# Patient Record
Sex: FEMALE | Race: WHITE | HISPANIC OR LATINO | Employment: FULL TIME | URBAN - METROPOLITAN AREA
[De-identification: names, ages, dates, MRNs, and addresses within clinical notes are randomized per-mention and may not be internally consistent; named-entity substitution may affect disease eponyms.]

---

## 2018-01-12 ENCOUNTER — GENERIC CONVERSION - ENCOUNTER (OUTPATIENT)
Dept: OTHER | Facility: OTHER | Age: 53
End: 2018-01-12

## 2018-01-12 ENCOUNTER — ALLSCRIPTS OFFICE VISIT (OUTPATIENT)
Dept: OTHER | Facility: OTHER | Age: 53
End: 2018-01-12

## 2018-01-12 DIAGNOSIS — K21.9 GASTRO-ESOPHAGEAL REFLUX DISEASE WITHOUT ESOPHAGITIS: ICD-10-CM

## 2018-01-12 DIAGNOSIS — M48.00 SPINAL STENOSIS: ICD-10-CM

## 2018-01-12 DIAGNOSIS — I10 ESSENTIAL (PRIMARY) HYPERTENSION: ICD-10-CM

## 2018-01-12 DIAGNOSIS — L70.0 ACNE VULGARIS: ICD-10-CM

## 2018-01-12 DIAGNOSIS — G47.00 INSOMNIA: ICD-10-CM

## 2018-01-12 DIAGNOSIS — J45.20 MILD INTERMITTENT ASTHMA, UNCOMPLICATED: ICD-10-CM

## 2018-01-12 DIAGNOSIS — F41.8 OTHER SPECIFIED ANXIETY DISORDERS: ICD-10-CM

## 2018-01-13 NOTE — PROGRESS NOTES
Assessment   1  Benign hypertension (401 1) (I10)   2  Asthma, mild intermittent, well-controlled (493 90) (J45 20)   3  Cystic acne (706 1) (L70 0)   4  Anxiety and depression (300 00,311) (F41 8)   5  Chronic GERD (530 81) (K21 9)   6  Insomnia (780 52) (G47 00)   7  Spinal stenosis (724 00) (M48 00)   8  Never a smoker    Discussion/Summary   Discussion Summary:    [facility] AND [facility]S CONTRACT TO PAIN MANAGEMENT WITH PT NEEDS TO SEE PAIN MANAGEMENT, THAT WE WILL NOT BE PRESCRIBING MONTHLY TRAMADOL AND SOMA ADDICTIVE NATURE OF TRAMADOL AND SOMA 2-3 MONTHS WITH ANY PROBLEMS      Counseling Documentation With Imm: The patient was counseled regarding  Medication SE Review and Pt Understands Tx: Possible side effects of new medications were reviewed with the patient/guardian today  The treatment plan was reviewed with the patient/guardian  The patient/guardian understands and agrees with the treatment plan      Chief Complaint   Chief Complaint Free Text Note Form: NP here to establish care  No complaints or pain  Wants to refill meds - Lexapro most critical       History of Present Illness   HPI: HERE AS A NEW PATIENT HERE FROM [place] [facility] AND HAS TO STAY ON PPI FROM JOB STRESS STRESS PASSED AND MOTHER IN LAW PASSED WAS CRITICALLY ILL BEEN TAKING TRAMADOL AND SOMA FOR CHRONIC PAIN FROM SPINAL STENOSIS WITH PATIENT WE DON'T TREAT CHRONIC PAIN AND SHE WOULD NEED TO BE REFERRED TO PAIN MANAGEMENT HAS BEEN CONTROLLED PER PT  HYPERTENSION UNDER CONTROL WITH MEDICATION PAP, COLONOSCOPY, EGD, MAMMOGRAM AND EYE EXAM ALL IN 2017 C/O LARGE BREASTS AND GIVING HER NECK PAIN AND ARM NUMBNESS AND TINGLING DUE TO LARGE BREASTS, HAS INDENTATION OF SHOULDER FROM BRA STRAPS               Review of Systems   Complete-Female:      Constitutional: No fever, no chills, feels well, no tiredness, no recent weight gain or weight loss        Eyes: No complaints of eye pain, no red eyes, no eyesight problems, no discharge, no dry eyes, no itching of eyes  ENT: no complaints of earache, no loss of hearing, no nose bleeds, no nasal discharge, no sore throat, no hoarseness  Cardiovascular: as noted in HPI  Respiratory: No complaints of shortness of breath, no wheezing, no cough, no SOB on exertion, no orthopnea, no PND  Gastrointestinal: No complaints of abdominal pain, no constipation, no nausea or vomiting, no diarrhea, no bloody stools  Genitourinary: No complaints of dysuria, no incontinence, no pelvic pain, no dysmenorrhea, no vaginal discharge or bleeding  Musculoskeletal: as noted in HPI  Integumentary: No complaints of skin rash or lesions, no itching, no skin wounds, no breast pain or lump  Neurological: No complaints of headache, no confusion, no convulsions, no numbness, no dizziness or fainting, no tingling, no limb weakness, no difficulty walking  Psychiatric: as noted in HPI  Endocrine: No complaints of proptosis, no hot flashes, no muscle weakness, no deepening of the voice, no feelings of weakness  Hematologic/Lymphatic: No complaints of swollen glands, no swollen glands in the neck, does not bleed easily, does not bruise easily  Active Problems   1  Anxiety and depression (300 00,311) (F41 8)   2  Asthma, mild intermittent, well-controlled (493 90) (J45 20)   3  Benign hypertension (401 1) (I10)   4  Chronic GERD (530 81) (K21 9)   5  Cystic acne (706 1) (L70 0)   6  Insomnia (780 52) (G47 00)   7  Spinal stenosis (724 00) (M48 00)    Past Medical History   1  History of anemia (V12 3) (Z86 2)   2  History of edema (V13 89) (U08 272)  Active Problems And Past Medical History Reviewed: The active problems and past medical history were reviewed and updated today  Surgical History   1  History of Cholecystectomy Laparoscopic   2  History of Excision Of Single Dewitt's Neuroma   3  History of Neuroplasty Median Nerve At Carpal Tunnel   4   History of Shoulder Surgery   5  History of Tonsillectomy   6  History of Total Hip Replacement Left   7  History of Total Knee Replacement  Surgical History Reviewed: The surgical history was reviewed and updated today  Family History   Mother    1  Family history of    2  Family history of cardiac disorder (V17 49) (Z82 49)   3  Family history of hypertension (V17 49) (Z82 49)   4  Family history of Type 2 diabetes mellitus without complication, with long-term current     use of insulin  Family History Reviewed: The family history was reviewed and updated today  Social History    · Alcohol use (V49 89) (Z78 9)   · Never a smoker  Social History Reviewed: The social history was reviewed and updated today  Current Meds    1  Carisoprodol 350 MG Oral Tablet; TAKE 1 TABLET Twice daily PRN SPASM; Therapy: 05GXA2032 to (Evaluate:2018); Last Rx:2018 Ordered   2  Escitalopram Oxalate 20 MG Oral Tablet; TAKE 1 TABLET DAILY; Therapy: 18QVW9696 to (Evaluate:2018)  Requested for: 54OOK5884; Last     Rx:2018 Ordered   3  Lisinopril 20 MG Oral Tablet; Take 1 tablet daily; Therapy: 81TMK3244 to (Evaluate:2018)  Requested for: 86MLN6893; Last     Rx:2018 Ordered   4  LORazepam 1 MG Oral Tablet; Take 1 tablet twice daily prn anxiety; Therapy: 42FVU3846 to (Evaluate:2018); Last Rx:2018 Ordered   5  Pantoprazole Sodium 40 MG Oral Tablet Delayed Release; TAKE 1 TABLET ONCE DAILY; Therapy: 75VIB7647 to (Evaluate:2018)  Requested for: 29SEG6327; Last     Rx:2018 Ordered   6  Spironolactone 50 MG Oral Tablet; take 1 tablet by mouth every day; Therapy: 00TSH7597 to (Last Rx:2018)  Requested for: 2018 Ordered   7  TraMADol HCl - 50 MG Oral Tablet; TAKE 1 TABLET EVERY 8 HOURS AS NEEDED; Therapy: 74HPV3142 to (Evaluate:2018); Last Rx:2018 Ordered   8   Zolpidem Tartrate 10 MG Oral Tablet; TAKE 1 TABLET AT BEDTIME AS NEEDED FOR SLEEP; Therapy: 20PDF2374 to (Evaluate:93Oly9285); Last Rx:12Jan2018 Ordered    Allergies   1  Demerol TABS   2  Iodinated Contrast Media   3  Sulfa Drugs  4  Tape    Physical Exam        Constitutional      General appearance: No acute distress, well appearing and well nourished  Eyes      Conjunctiva and lids: No swelling, erythema or discharge  Pupils and irises: Equal, round and reactive to light  Ears, Nose, Mouth, and Throat      External inspection of ears and nose: Normal        Otoscopic examination: Tympanic membranes translucent with normal light reflex  Canals patent without erythema  Nasal mucosa, septum, and turbinates: Normal without edema or erythema  Oropharynx: Normal with no erythema, edema, exudate or lesions  Pulmonary      Respiratory effort: No increased work of breathing or signs of respiratory distress  Auscultation of lungs: Clear to auscultation  Cardiovascular      Palpation of heart: Normal PMI, no thrills  Auscultation of heart: Normal rate and rhythm, normal S1 and S2, without murmurs  Examination of extremities for edema and/or varicosities: Normal        Carotid pulses: Normal        Abdomen      Abdomen: Non-tender, no masses  Liver and spleen: No hepatomegaly or splenomegaly  Lymphatic      Palpation of lymph nodes in neck: No lymphadenopathy  Musculoskeletal      Gait and station: Normal        Digits and nails: Normal without clubbing or cyanosis  Inspection/palpation of joints, bones, and muscles: Normal        Skin      Skin and subcutaneous tissue: Normal without rashes or lesions  Neurologic      Cranial nerves: Cranial nerves 2-12 intact  Reflexes: 2+ and symmetric  Sensation: No sensory loss         Psychiatric      Orientation to person, place, and time: Normal        Mood and affect: Normal           Signatures    Electronically signed by : FRANCISCO J Handy; Jan 12 2018  1:59PM EST                       (Author)     Electronically signed by : Etta Baker, [address] [place] [address]; Jan 12 2018  3:01PM EST                       (Author)     Electronically signed by : Sonia Bolivar MD; Jan 12 2018  4:01PM EST                       (Author)

## 2018-01-15 ENCOUNTER — TRANSCRIBE ORDERS (OUTPATIENT)
Dept: ADMINISTRATIVE | Facility: HOSPITAL | Age: 53
End: 2018-01-15

## 2018-01-15 DIAGNOSIS — M48.061 SPINAL STENOSIS OF LUMBAR REGION, UNSPECIFIED WHETHER NEUROGENIC CLAUDICATION PRESENT: Primary | ICD-10-CM

## 2018-01-23 VITALS
TEMPERATURE: 98 F | HEART RATE: 80 BPM | HEIGHT: 68 IN | RESPIRATION RATE: 16 BRPM | SYSTOLIC BLOOD PRESSURE: 112 MMHG | DIASTOLIC BLOOD PRESSURE: 70 MMHG

## 2018-04-10 DIAGNOSIS — F41.9 ANXIETY: Primary | ICD-10-CM

## 2018-04-11 RX ORDER — LORAZEPAM 1 MG/1
TABLET ORAL
Qty: 30 TABLET | Refills: 1 | OUTPATIENT
Start: 2018-04-11 | End: 2018-04-21 | Stop reason: SDUPTHER

## 2018-04-21 DIAGNOSIS — F41.9 ANXIETY: ICD-10-CM

## 2018-04-23 RX ORDER — LORAZEPAM 1 MG/1
TABLET ORAL
Qty: 30 TABLET | Refills: 1 | Status: SHIPPED | OUTPATIENT
Start: 2018-04-23 | End: 2018-09-07

## 2018-05-23 LAB
ALBUMIN SERPL-MCNC: 4.3 G/DL (ref 3.6–5.1)
ALBUMIN/GLOB SERPL: 1.5 (CALC) (ref 1–2.5)
ALP SERPL-CCNC: 72 U/L (ref 33–130)
ALT SERPL-CCNC: 17 U/L (ref 6–29)
AST SERPL-CCNC: 22 U/L (ref 10–35)
BASOPHILS # BLD AUTO: 30 CELLS/UL (ref 0–200)
BASOPHILS NFR BLD AUTO: 0.5 %
BILIRUB SERPL-MCNC: 0.8 MG/DL (ref 0.2–1.2)
BUN SERPL-MCNC: 14 MG/DL (ref 7–25)
BUN/CREAT SERPL: ABNORMAL (CALC) (ref 6–22)
CALCIUM SERPL-MCNC: 9.4 MG/DL (ref 8.6–10.4)
CHLORIDE SERPL-SCNC: 101 MMOL/L (ref 98–110)
CHOLEST SERPL-MCNC: 156 MG/DL
CHOLEST/HDLC SERPL: 2.1 (CALC)
CO2 SERPL-SCNC: 29 MMOL/L (ref 20–31)
CREAT SERPL-MCNC: 0.92 MG/DL (ref 0.5–1.05)
EOSINOPHIL # BLD AUTO: 100 CELLS/UL (ref 15–500)
EOSINOPHIL NFR BLD AUTO: 1.7 %
ERYTHROCYTE [DISTWIDTH] IN BLOOD BY AUTOMATED COUNT: 12.7 % (ref 11–15)
GLOBULIN SER CALC-MCNC: 2.9 G/DL (CALC) (ref 1.9–3.7)
GLUCOSE SERPL-MCNC: 103 MG/DL (ref 65–99)
HCT VFR BLD AUTO: 39.3 % (ref 35–45)
HDLC SERPL-MCNC: 75 MG/DL
HGB BLD-MCNC: 12.8 G/DL (ref 11.7–15.5)
LDLC SERPL CALC-MCNC: 67 MG/DL (CALC)
LYMPHOCYTES # BLD AUTO: 1227 CELLS/UL (ref 850–3900)
LYMPHOCYTES NFR BLD AUTO: 20.8 %
MCH RBC QN AUTO: 31.6 PG (ref 27–33)
MCHC RBC AUTO-ENTMCNC: 32.6 G/DL (ref 32–36)
MCV RBC AUTO: 97 FL (ref 80–100)
MONOCYTES # BLD AUTO: 407 CELLS/UL (ref 200–950)
MONOCYTES NFR BLD AUTO: 6.9 %
NEUTROPHILS # BLD AUTO: 4136 CELLS/UL (ref 1500–7800)
NEUTROPHILS NFR BLD AUTO: 70.1 %
NONHDLC SERPL-MCNC: 81 MG/DL (CALC)
PLATELET # BLD AUTO: 262 THOUSAND/UL (ref 140–400)
PMV BLD REES-ECKER: 9.6 FL (ref 7.5–12.5)
POTASSIUM SERPL-SCNC: 5 MMOL/L (ref 3.5–5.3)
PROT SERPL-MCNC: 7.2 G/DL (ref 6.1–8.1)
RBC # BLD AUTO: 4.05 MILLION/UL (ref 3.8–5.1)
SL AMB EGFR AFRICAN AMERICAN: 82 ML/MIN/1.73M2
SL AMB EGFR NON AFRICAN AMERICAN: 71 ML/MIN/1.73M2
SODIUM SERPL-SCNC: 137 MMOL/L (ref 135–146)
T4 FREE SERPL-MCNC: 1 NG/DL (ref 0.8–1.8)
THYROGLOB AB SERPL-ACNC: 34 IU/ML
THYROPEROXIDASE AB SERPL-ACNC: 208 IU/ML
TRIGL SERPL-MCNC: 55 MG/DL
TSH SERPL-ACNC: 4.6 MIU/L
WBC # BLD AUTO: 5.9 THOUSAND/UL (ref 3.8–10.8)

## 2018-05-25 ENCOUNTER — OFFICE VISIT (OUTPATIENT)
Dept: FAMILY MEDICINE CLINIC | Facility: CLINIC | Age: 53
End: 2018-05-25
Payer: COMMERCIAL

## 2018-05-25 VITALS
RESPIRATION RATE: 12 BRPM | SYSTOLIC BLOOD PRESSURE: 140 MMHG | DIASTOLIC BLOOD PRESSURE: 90 MMHG | WEIGHT: 270 LBS | BODY MASS INDEX: 40.54 KG/M2 | HEART RATE: 88 BPM

## 2018-05-25 DIAGNOSIS — E03.8 HYPOTHYROIDISM DUE TO HASHIMOTO'S THYROIDITIS: Primary | ICD-10-CM

## 2018-05-25 DIAGNOSIS — Z98.84 S/P BARIATRIC SURGERY: ICD-10-CM

## 2018-05-25 DIAGNOSIS — I10 BENIGN HYPERTENSION: ICD-10-CM

## 2018-05-25 DIAGNOSIS — F32.A ANXIETY AND DEPRESSION: ICD-10-CM

## 2018-05-25 DIAGNOSIS — M48.061 SPINAL STENOSIS OF LUMBAR REGION WITHOUT NEUROGENIC CLAUDICATION: ICD-10-CM

## 2018-05-25 DIAGNOSIS — Z12.11 SCREENING FOR COLON CANCER: ICD-10-CM

## 2018-05-25 DIAGNOSIS — E06.3 HYPOTHYROIDISM DUE TO HASHIMOTO'S THYROIDITIS: Primary | ICD-10-CM

## 2018-05-25 DIAGNOSIS — F41.9 ANXIETY AND DEPRESSION: ICD-10-CM

## 2018-05-25 PROBLEM — L70.0 CYSTIC ACNE: Status: ACTIVE | Noted: 2018-01-12

## 2018-05-25 PROBLEM — J45.909 ASTHMA, WELL CONTROLLED: Status: ACTIVE | Noted: 2018-01-12

## 2018-05-25 PROBLEM — G47.00 INSOMNIA: Status: ACTIVE | Noted: 2018-01-12

## 2018-05-25 PROBLEM — K21.9 CHRONIC GERD: Status: ACTIVE | Noted: 2018-01-12

## 2018-05-25 PROBLEM — M48.00 SPINAL STENOSIS: Status: ACTIVE | Noted: 2018-01-12

## 2018-05-25 PROCEDURE — 99214 OFFICE O/P EST MOD 30 MIN: CPT | Performed by: NURSE PRACTITIONER

## 2018-05-25 RX ORDER — LORAZEPAM 1 MG/1
1 TABLET ORAL 2 TIMES DAILY PRN
COMMUNITY
Start: 2018-01-12 | End: 2018-10-09 | Stop reason: SDUPTHER

## 2018-05-25 RX ORDER — ESCITALOPRAM OXALATE 20 MG/1
1 TABLET ORAL DAILY
COMMUNITY
Start: 2018-01-12 | End: 2019-02-06 | Stop reason: SDUPTHER

## 2018-05-25 RX ORDER — CARISOPRODOL 350 MG/1
1 TABLET ORAL AS NEEDED
COMMUNITY
Start: 2018-01-12 | End: 2019-09-13

## 2018-05-25 RX ORDER — PANTOPRAZOLE SODIUM 40 MG/1
1 TABLET, DELAYED RELEASE ORAL DAILY
COMMUNITY
Start: 2018-01-12 | End: 2018-09-17 | Stop reason: SDUPTHER

## 2018-05-25 RX ORDER — LEVOTHYROXINE SODIUM 0.03 MG/1
25 TABLET ORAL DAILY
Qty: 30 TABLET | Refills: 3 | Status: SHIPPED | OUTPATIENT
Start: 2018-05-25 | End: 2018-06-27 | Stop reason: SDUPTHER

## 2018-05-25 RX ORDER — SPIRONOLACTONE 50 MG/1
1 TABLET, FILM COATED ORAL DAILY
COMMUNITY
Start: 2018-01-12 | End: 2018-10-10 | Stop reason: SDUPTHER

## 2018-05-25 RX ORDER — TRAMADOL HYDROCHLORIDE 50 MG/1
1 TABLET ORAL EVERY 8 HOURS PRN
COMMUNITY
Start: 2018-01-12 | End: 2018-09-07

## 2018-05-25 RX ORDER — ZOLPIDEM TARTRATE 10 MG/1
1 TABLET ORAL
COMMUNITY
Start: 2018-01-12 | End: 2018-06-08 | Stop reason: SDUPTHER

## 2018-05-25 RX ORDER — LISINOPRIL 20 MG/1
1 TABLET ORAL DAILY
COMMUNITY
Start: 2018-01-12 | End: 2018-08-01 | Stop reason: SDUPTHER

## 2018-05-25 NOTE — PROGRESS NOTES
Assessment/Plan:           Problem List Items Addressed This Visit        Cardiovascular and Mediastinum    Benign hypertension     Stable   Cont current meds           Relevant Medications    lisinopril (ZESTRIL) 20 mg tablet    spironolactone (ALDACTONE) 50 mg tablet       Other    Anxiety and depression     Stable  Cont current meds           Relevant Medications    escitalopram (LEXAPRO) 20 mg tablet    zolpidem (AMBIEN) 10 mg tablet    LORazepam (ATIVAN) 1 mg tablet    Spinal stenosis     Patient wishes to pursue bariatric surgery revision  Cont conservative measures  meds prn          S/P bariatric surgery     Refer to bariatric surgery         Relevant Orders    Ambulatory referral to Bariatric Surgery      Other Visit Diagnoses     Hypothyroidism due to Hashimoto's thyroiditis    -  Primary    Relevant Medications    levothyroxine 25 mcg tablet    Other Relevant Orders    TSH, 3rd generation with T4 reflex    Screening for colon cancer                Subjective:      Patient ID: Jossue Abraham is a 48 y o  female  Here for f/u to chronic medical conditions  Reviewed labs  New dx of thyroiditis and hypothyroid, most likely hashimotos thyroiditis  Would like to see dr Elizabeth Gutierrez for endoscopic revision of bariatric surgery           The following portions of the patient's history were reviewed and updated as appropriate: allergies, current medications, past family history, past medical history, past social history, past surgical history and problem list     Review of Systems   Constitutional: Positive for fatigue  Negative for activity change, appetite change and fever  HENT: Negative for congestion and hearing loss  Eyes: Negative for photophobia and visual disturbance  Respiratory: Negative for cough, shortness of breath and wheezing  Cardiovascular: Negative for chest pain, palpitations and leg swelling  Gastrointestinal: Negative for constipation, diarrhea, nausea and vomiting     Endocrine: Negative for polydipsia, polyphagia and polyuria  Genitourinary: Negative for dysuria, frequency and hematuria  Musculoskeletal: Positive for arthralgias, back pain and myalgias  Skin: Negative for rash  Neurological: Negative for dizziness, light-headedness and headaches  Hematological: Negative for adenopathy  Psychiatric/Behavioral: Negative for sleep disturbance  The patient is not nervous/anxious  Objective:      /90   Pulse 88   Resp 12   Wt 122 kg (270 lb)   BMI 40 54 kg/m²     Family History   Problem Relation Age of Onset    Hypertension Mother     Other Mother      cardiac disorder    Diabetes type II Mother      without complication, with long term use of insulin     Past Medical History:   Diagnosis Date    Anemia     Edema      Social History     Social History    Marital status: /Civil Union     Spouse name: N/A    Number of children: N/A    Years of education: N/A     Occupational History    Not on file       Social History Main Topics    Smoking status: Never Smoker    Smokeless tobacco: Never Used    Alcohol use Yes    Drug use: Unknown    Sexual activity: Not on file     Other Topics Concern    Not on file     Social History Narrative    No narrative on file       Current Outpatient Prescriptions:     carisoprodol (SOMA) 350 mg tablet, Take 1 tablet by mouth 2 (two) times a day as needed, Disp: , Rfl:     escitalopram (LEXAPRO) 20 mg tablet, Take 1 tablet by mouth daily, Disp: , Rfl:     lisinopril (ZESTRIL) 20 mg tablet, Take 1 tablet by mouth daily, Disp: , Rfl:     LORazepam (ATIVAN) 1 mg tablet, Take 1 tablet by mouth 2 (two) times a day as needed, Disp: , Rfl:     pantoprazole (PROTONIX) 40 mg tablet, Take 1 tablet by mouth daily, Disp: , Rfl:     spironolactone (ALDACTONE) 50 mg tablet, Take 1 tablet by mouth daily, Disp: , Rfl:     traMADol (ULTRAM) 50 mg tablet, Take 1 tablet by mouth every 8 (eight) hours as needed, Disp: , Rfl:   zolpidem (AMBIEN) 10 mg tablet, Take 1 tablet by mouth, Disp: , Rfl:     levothyroxine 25 mcg tablet, Take 1 tablet (25 mcg total) by mouth daily, Disp: 30 tablet, Rfl: 3    LORazepam (ATIVAN) 1 mg tablet, TAKE ONE TABLET BY MOUTH TWICE DAILY AS NEEDED FOR ANXIETY, Disp: 30 tablet, Rfl: 1  Allergies   Allergen Reactions    Iodine     Medical Tape     Meperidine     Sulfate         Physical Exam   Constitutional: She is oriented to person, place, and time  She appears well-developed and well-nourished  HENT:   Head: Normocephalic and atraumatic  Right Ear: External ear normal    Left Ear: External ear normal    Nose: Nose normal    Mouth/Throat: Oropharynx is clear and moist    Eyes: Conjunctivae are normal    Neck: Normal range of motion  Neck supple  Cardiovascular: Normal rate, regular rhythm and normal heart sounds  Pulmonary/Chest: Effort normal and breath sounds normal    Abdominal: Soft  Bowel sounds are normal    Musculoskeletal: Normal range of motion  Neurological: She is alert and oriented to person, place, and time  Skin: Skin is warm and dry  Psychiatric: She has a normal mood and affect  Her behavior is normal  Judgment and thought content normal    Nursing note and vitals reviewed

## 2018-06-08 DIAGNOSIS — F51.01 PRIMARY INSOMNIA: Primary | ICD-10-CM

## 2018-06-11 RX ORDER — ZOLPIDEM TARTRATE 10 MG/1
TABLET ORAL
Qty: 30 TABLET | Refills: 3 | Status: SHIPPED | OUTPATIENT
Start: 2018-06-11 | End: 2018-09-09

## 2018-06-14 DIAGNOSIS — E66.01 MORBID (SEVERE) OBESITY DUE TO EXCESS CALORIES (HCC): Primary | ICD-10-CM

## 2018-06-22 ENCOUNTER — HOSPITAL ENCOUNTER (OUTPATIENT)
Dept: RADIOLOGY | Facility: HOSPITAL | Age: 53
Discharge: HOME | End: 2018-06-22
Attending: SURGERY
Payer: COMMERCIAL

## 2018-06-22 DIAGNOSIS — E66.01 MORBID (SEVERE) OBESITY DUE TO EXCESS CALORIES (HCC): ICD-10-CM

## 2018-06-22 PROCEDURE — 74240 X-RAY XM UPR GI TRC 1CNTRST: CPT

## 2018-06-27 DIAGNOSIS — E06.3 HYPOTHYROIDISM DUE TO HASHIMOTO'S THYROIDITIS: ICD-10-CM

## 2018-06-27 DIAGNOSIS — E03.8 HYPOTHYROIDISM DUE TO HASHIMOTO'S THYROIDITIS: ICD-10-CM

## 2018-06-27 RX ORDER — LEVOTHYROXINE SODIUM 0.07 MG/1
75 TABLET ORAL DAILY
Qty: 30 TABLET | Refills: 3 | OUTPATIENT
Start: 2018-06-27

## 2018-06-27 RX ORDER — LEVOTHYROXINE SODIUM 0.05 MG/1
50 TABLET ORAL DAILY
Qty: 30 TABLET | Refills: 5 | Status: SHIPPED | OUTPATIENT
Start: 2018-06-27 | End: 2019-01-05 | Stop reason: SDUPTHER

## 2018-07-06 ENCOUNTER — OFFICE VISIT (OUTPATIENT)
Dept: BARIATRICS | Facility: CLINIC | Age: 53
End: 2018-07-06
Payer: COMMERCIAL

## 2018-07-06 VITALS
SYSTOLIC BLOOD PRESSURE: 100 MMHG | HEART RATE: 75 BPM | HEIGHT: 68 IN | TEMPERATURE: 99.3 F | DIASTOLIC BLOOD PRESSURE: 84 MMHG | WEIGHT: 268.5 LBS | BODY MASS INDEX: 40.69 KG/M2

## 2018-07-06 DIAGNOSIS — E66.01 MORBID (SEVERE) OBESITY DUE TO EXCESS CALORIES (HCC): Primary | ICD-10-CM

## 2018-07-06 PROBLEM — Z98.84 BARIATRIC SURGERY STATUS: Status: ACTIVE | Noted: 2018-07-06

## 2018-07-06 PROCEDURE — 99204 OFFICE O/P NEW MOD 45 MIN: CPT | Performed by: SURGERY

## 2018-07-06 NOTE — LETTER
July 6, 2018     [facility] [address]  [address]  [address] [place] [address]    Patient: Bernadine Lobo   YOB: 1965   Date of Visit: 7/6/2018       Dear Dr Nicolle Ribeiro:    Thank you for referring Bernadine Lobo to me for evaluation  Below are my notes for this consultation  If you have questions, please do not hesitate to call me  I look forward to following your patient along with you  Sincerely,        Gerhardt Ely, MD        CC: No Recipients  Gerhardt Ely, MD  7/6/2018  3:16 PM  Sign at close encounter  BARIATRIC CONSULT-INITIAL - [address] [address] 48 y o  female MRN: [id number]  Unit/Bed#:  Encounter: [id number]      HPI:  Bernadine Lobo is a 48 y o  female who presents with morbid obesity to discuss weight loss options  Review of Systems   Constitutional: Negative  HENT: Negative  Eyes: Negative  Respiratory: Negative  Cardiovascular: Negative  Gastrointestinal: Negative  Endocrine: Negative  Genitourinary: Negative  Musculoskeletal: Negative  Skin: Negative  Neurological: Negative  Hematological: Negative  Psychiatric/Behavioral: Negative          Historical Information   Past Medical History:   Diagnosis Date    Anemia     Edema      Past Surgical History:   Procedure Laterality Date    CARPAL TUNNEL RELEASE      CHOLECYSTECTOMY      GASTRIC BYPASS      NEUROMA EXCISION      single Dewitt's neuroma    SHOULDER SURGERY      TONSILLECTOMY      TOTAL HIP ARTHROPLASTY Left     TOTAL KNEE ARTHROPLASTY       Social History   History   Alcohol Use    Yes     History   Drug use: Unknown     History   Smoking Status    Never Smoker   Smokeless Tobacco    Never Used     Family History: {MIRIAM BOOTH FAM HISTORY SMARTLIST:[id number]}    Meds/Allergies   {MIRIAM BOOTH H&P CGPW:711867738}  Allergies   Allergen Reactions    Iodine     Medical Tape     Meperidine     Sulfate        Objective       Current Vitals:   Blood Pressure: 100/84 (07/06/18 1400)  Pulse: 75 (07/06/18 1400)  Temperature: 99 3 °F (37 4 °C) (07/06/18 1400)  Height: 5' 7 5" (171 5 cm) (07/06/18 1400)  Weight - Scale: 122 kg (268 lb 8 oz) (07/06/18 1400)  Body mass index is 41 43 kg/m²  Invasive Devices          No matching active lines, drains, or airways          Physical Exam   Constitutional: She appears well-developed and well-nourished  Lab Results: {SL IP GEN DEONNA LABS:78146}  Imaging: {Results Review Statement:32289}  EKG, Pathology, and Other Studies: {Results Review Statement:33171}    Code Status: [unfilled]  Advance Directive and Living Will:      Power of :    POLST:      Assessment/PLAN:    I had a long discussion with the patient regarding weight regain following bariatric surgery  Patient is s/p LRYGB in 2009 by Dr Adri Graham in [place], her initial weight was 300lbs and her lowest was 190 lbs  I explained to the patient that weight regain or recidivism following bariatric surgery is a multifactorial process  We also talked about postoperative commitment and compliance  Patient underwent a recent upper GI which I reviewed today in the office with the patient in details  The upper GI did show  large non-excluded fundus  However,  there was no evidence of gastro-gastric fistula  I will complete the weight regain workup by performing an EGD to R/O a gastrogastric  fistula and measure the gastric pouch and the stoma size  I will also check some blood work to R/O metabolic reasons behind weight gain like hypothyroidism  I spent 30 min with the patient, more than 50% of the time was spent coordinating care

## 2018-07-06 NOTE — PROGRESS NOTES
BARIATRIC CONSULT-INITIAL - BARIATRIC SURGERY  Monik Pal 48 y o  female MRN: [id number]  Unit/Bed#:  Encounter: [id number]      HPI:  Monik Pal is a 48 y o  female who presents with morbid obesity to discuss weight loss options  Review of Systems   Constitutional: Negative  HENT: Negative  Eyes: Negative  Respiratory: Negative  Cardiovascular: Negative  Gastrointestinal: Negative  Endocrine: Negative  Genitourinary: Negative  Musculoskeletal: Negative  Skin: Negative  Neurological: Negative  Hematological: Negative  Psychiatric/Behavioral: Negative  Historical Information   Past Medical History:   Diagnosis Date    Anemia     Edema      Past Surgical History:   Procedure Laterality Date    CARPAL TUNNEL RELEASE      CHOLECYSTECTOMY      GASTRIC BYPASS      NEUROMA EXCISION      single Dewitt's neuroma    SHOULDER SURGERY      TONSILLECTOMY      TOTAL HIP ARTHROPLASTY Left     TOTAL KNEE ARTHROPLASTY       Social History   History   Alcohol Use    Yes     History   Drug use: Unknown     History   Smoking Status    Never Smoker   Smokeless Tobacco    Never Used     Family History: non-contributory    Meds/Allergies   all medications and allergies reviewed  Allergies   Allergen Reactions    Iodine     Medical Tape     Meperidine     Sulfate        Objective       Current Vitals:   Blood Pressure: 100/84 (07/06/18 1400)  Pulse: 75 (07/06/18 1400)  Temperature: 99 3 °F (37 4 °C) (07/06/18 1400)  Height: 5' 7 5" (171 5 cm) (07/06/18 1400)  Weight - Scale: 122 kg (268 lb 8 oz) (07/06/18 1400)  Body mass index is 41 43 kg/m²  Invasive Devices          No matching active lines, drains, or airways          Physical Exam   Constitutional: She appears well-developed and well-nourished  Lab Results: I have personally reviewed pertinent lab results  Imaging: I have personally reviewed pertinent reports      EKG, Pathology, and Other Studies: I have personally reviewed pertinent reports  Code Status: [unfilled]  Advance Directive and Living Will:      Power of :    POLST:      Assessment/PLAN:    I had a long discussion with the patient regarding weight regain following bariatric surgery  Patient is s/p LRYGB in 2009 by Dr Ramakrishna Kumari in [place], her initial weight was 300lbs and her lowest was 190 lbs  I explained to the patient that weight regain or recidivism following bariatric surgery is a multifactorial process  We also talked about postoperative commitment and compliance  Patient underwent a recent upper GI which I reviewed today in the office with the patient in details  The upper GI did show  large non-excluded fundus  However,  there was no evidence of gastro-gastric fistula  I will complete the weight regain workup by performing an EGD to R/O a gastrogastric  fistula and measure the gastric pouch and the stoma size  I will also check some blood work to R/O metabolic reasons behind weight gain like hypothyroidism  I spent 30 min with the patient, more than 50% of the time was spent coordinating care

## 2018-07-29 LAB — TSH SERPL-ACNC: 1.55 MIU/L

## 2018-07-31 ENCOUNTER — TELEPHONE (OUTPATIENT)
Dept: FAMILY MEDICINE CLINIC | Facility: CLINIC | Age: 53
End: 2018-07-31

## 2018-08-01 DIAGNOSIS — I10 ESSENTIAL HYPERTENSION: Primary | ICD-10-CM

## 2018-08-01 RX ORDER — LISINOPRIL 20 MG/1
TABLET ORAL
Qty: 30 TABLET | Refills: 5 | Status: SHIPPED | OUTPATIENT
Start: 2018-08-01 | End: 2019-03-07 | Stop reason: SDUPTHER

## 2018-08-21 ENCOUNTER — ANESTHESIA EVENT (OUTPATIENT)
Dept: GASTROENTEROLOGY | Facility: HOSPITAL | Age: 53
End: 2018-08-21
Payer: COMMERCIAL

## 2018-08-22 ENCOUNTER — ANESTHESIA (OUTPATIENT)
Dept: GASTROENTEROLOGY | Facility: HOSPITAL | Age: 53
End: 2018-08-22
Payer: COMMERCIAL

## 2018-08-22 ENCOUNTER — HOSPITAL ENCOUNTER (OUTPATIENT)
Facility: HOSPITAL | Age: 53
Setting detail: OUTPATIENT SURGERY
Discharge: HOME | End: 2018-08-22
Attending: SURGERY | Admitting: SURGERY
Payer: COMMERCIAL

## 2018-08-22 VITALS
DIASTOLIC BLOOD PRESSURE: 58 MMHG | RESPIRATION RATE: 14 BRPM | TEMPERATURE: 99.2 F | OXYGEN SATURATION: 97 % | BODY MASS INDEX: 40.76 KG/M2 | WEIGHT: 268.96 LBS | HEIGHT: 68 IN | SYSTOLIC BLOOD PRESSURE: 126 MMHG | HEART RATE: 67 BPM

## 2018-08-22 DIAGNOSIS — Z98.84 BARIATRIC SURGERY STATUS: Primary | ICD-10-CM

## 2018-08-22 PROCEDURE — 43235 EGD DIAGNOSTIC BRUSH WASH: CPT | Performed by: SURGERY

## 2018-08-22 RX ORDER — SODIUM CHLORIDE 9 MG/ML
125 INJECTION, SOLUTION INTRAVENOUS CONTINUOUS
Status: DISCONTINUED | OUTPATIENT
Start: 2018-08-22 | End: 2018-08-22 | Stop reason: HOSPADM

## 2018-08-22 RX ORDER — PROPOFOL 10 MG/ML
INJECTION, EMULSION INTRAVENOUS AS NEEDED
Status: DISCONTINUED | OUTPATIENT
Start: 2018-08-22 | End: 2018-08-22 | Stop reason: SURG

## 2018-08-22 RX ADMIN — PROPOFOL 200 MG: 10 INJECTION, EMULSION INTRAVENOUS at 08:31

## 2018-08-22 RX ADMIN — SODIUM CHLORIDE 125 ML/HR: 0.9 INJECTION, SOLUTION INTRAVENOUS at 07:46

## 2018-08-22 RX ADMIN — PROPOFOL 50 MG: 10 INJECTION, EMULSION INTRAVENOUS at 08:32

## 2018-08-22 NOTE — H&P
H&P EXAM - Outpatient Endoscopy  [address] GI LAB INTRA   Catrachito Millan 48 y o  female MRN: [id number]  Unit/Bed#: [facility] Encounter: [id number]        Impression: Morbid obesity    Plan:Upper endoscopy and a biopsy to rule out H  Pylori    Chief Complaint: Morbid obesity and preoperative endoscopy    Physical Exam: Normal not in acute distress   Chest: Clear to auscultation   Heart: Normal S1 and S2

## 2018-08-22 NOTE — DISCHARGE INSTRUCTIONS
Hiatal Hernia   WHAT YOU NEED TO KNOW:   A hiatal hernia is a condition that causes part of your stomach to bulge through the hiatus (small opening) in your diaphragm  The part of the stomach may move up and down, or it may get trapped above the diaphragm  DISCHARGE INSTRUCTIONS:   Seek care immediately if:   · You have severe abdominal pain  · You try to vomit but nothing comes out (retching)  · You have severe chest pain and sudden trouble breathing  · Your bowel movements are black or bloody  · Your vomit looks like coffee grounds or has blood in it  Contact your healthcare provider if:   · Your symptoms are getting worse  · You have nausea, and you are vomiting  · You are losing weight without trying  · You have questions or concerns about your condition or care  Medicines:   · Medicines  may be given to relieve heartburn symptoms  These medicines help to decrease or block stomach acid  You may also be given medicines that help to tighten the esophageal sphincter  · Take your medicine as directed  Contact your healthcare provider if you think your medicine is not helping or if you have side effects  Tell him or her if you are allergic to any medicine  Keep a list of the medicines, vitamins, and herbs you take  Include the amounts, and when and why you take them  Bring the list or the pill bottles to follow-up visits  Carry your medicine list with you in case of an emergency  Follow up with your healthcare provider as directed:  Write down your questions so you remember to ask them during your visits  Self care:   · Avoid foods that make your symptoms worse  These may include spicy foods, fruit juices, alcohol, caffeine, chocolate, and mint  · Eat several small meals during the day  Small meals give your stomach less food to digest     · Avoid lying down and bending forward after you eat  Do not eat meals 2 to 3 hours before bedtime   This decreases your risk for reflux  · Maintain a healthy weight  If you are overweight, weight loss may help relieve your symptoms  · Sleep with your head elevated  at least 6 inches  · Do not smoke  Smoking can increase your symptoms of heartburn  © 2017 [address] [employer] Information is for End User's use only and may not be sold, redistributed or otherwise used for commercial purposes  All illustrations and images included in CareNotes® are the copyrighted property of A D [employer] M , [employer]  or [employer]  The above information is an  only  It is not intended as medical advice for individual conditions or treatments  Talk to your doctor, nurse or pharmacist before following any medical regimen to see if it is safe and effective for you  Upper Endoscopy   WHAT YOU NEED TO KNOW:   An upper endoscopy is also called an upper gastrointestinal (GI) endoscopy, or an esophagogastroduodenoscopy (EGD)  You may feel bloated, gassy, or have some abdominal discomfort after your procedure  Your throat may be sore for 24 to 36 hours  You may burp or pass gas from air that is still inside your body  DISCHARGE INSTRUCTIONS:   Call 911 for any of the following:   · You have sudden chest pain or trouble breathing  Seek care immediately if:   · You feel dizzy or faint  · You have trouble swallowing  · Your bowel movements are very dark or black  · Your abdomen is hard and firm and you have severe pain  · You vomit blood  Contact your healthcare provider if:   · You feel full or bloated and cannot burp or pass gas  · You have not had a bowel movement for 3 days after your procedure  · You have neck pain  · You have a fever or chills  · You have nausea or are vomiting  · You have a rash or hives  · You have questions or concerns about your endoscopy  Relieve a sore throat:  Suck on throat lozenges or crushed ice  Gargle with a small amount of warm salt water   Mix 1 teaspoon of salt and 1 cup of warm water to make salt water  Relieve gas and discomfort from bloating:  Lie on your right side with a heating pad on your abdomen  Take short walks to help pass gas  Eat small meals until bloating is relieved  Rest after your procedure: You have been given medicine to relax you  Do not  drive or make important decisions until the day after your procedure  Return to your normal activity as directed  You can usually return to work the day after your procedure  Follow up with your healthcare provider as directed:  Write down your questions so you remember to ask them during your visits  © 2017 [address] is for End User's use only and may not be sold, redistributed or otherwise used for commercial purposes  All illustrations and images included in CareNotes® are the copyrighted property of A [employer] M [employer] Inc  or [employer]  The above information is an  only  It is not intended as medical advice for individual conditions or treatments  Talk to your doctor, nurse or pharmacist before following any medical regimen to see if it is safe and effective for you

## 2018-08-22 NOTE — ANESTHESIA PREPROCEDURE EVALUATION
Review of Systems/Medical History          Cardiovascular  Hypertension on > 1 medication,    Pulmonary  Asthma Last rescue: > 1 year ago Asthma type of rescue: PRN inhaler,        GI/Hepatic    GERD , Bariatric surgery,             Endo/Other  History of thyroid disease , hypothyroidism,      GYN       Hematology  Anemia ,     Musculoskeletal  Back pain , spinal stenosis,        Neurology  Negative neurology ROS      Psychology   Anxiety, Depression , being treated for depression,              Physical Exam    Airway    Mallampati score: II  TM Distance: >3 FB  Neck ROM: full     Dental   No notable dental hx     Cardiovascular  Rhythm: regular, Rate: normal, Cardiovascular exam normal    Pulmonary  Pulmonary exam normal Breath sounds clear to auscultation,     Other Findings        Anesthesia Plan  ASA Score- 3     Anesthesia Type- IV sedation with anesthesia with ASA Monitors  Additional Monitors:   Airway Plan:         Plan Factors-Patient not instructed to abstain from smoking on day of procedure  Patient did not smoke on day of surgery  Induction- intravenous  Postoperative Plan-     Informed Consent- Anesthetic plan and risks discussed with patient  I personally reviewed this patient with the CRNA  Discussed and agreed on the Anesthesia Plan with the CRNA  Natalee Reeder

## 2018-09-07 ENCOUNTER — OFFICE VISIT (OUTPATIENT)
Dept: BARIATRICS | Facility: CLINIC | Age: 53
End: 2018-09-07
Payer: COMMERCIAL

## 2018-09-07 VITALS
TEMPERATURE: 97.7 F | HEART RATE: 68 BPM | HEIGHT: 68 IN | BODY MASS INDEX: 41.15 KG/M2 | SYSTOLIC BLOOD PRESSURE: 128 MMHG | DIASTOLIC BLOOD PRESSURE: 80 MMHG | WEIGHT: 271.5 LBS

## 2018-09-07 DIAGNOSIS — Z98.84 S/P BARIATRIC SURGERY: ICD-10-CM

## 2018-09-07 DIAGNOSIS — K21.9 CHRONIC GERD: Primary | ICD-10-CM

## 2018-09-07 PROCEDURE — 99213 OFFICE O/P EST LOW 20 MIN: CPT | Performed by: SURGERY

## 2018-09-07 NOTE — ASSESSMENT & PLAN NOTE
Patient reports frequent episodes of esophageal reflux  She is on a PPI  She was found to have a small had a hernia on recent upper endoscopy on August 22nd

## 2018-09-07 NOTE — PROGRESS NOTES
Assessment/Plan:    Chronic GERD  Patient reports frequent episodes of esophageal reflux  She is on a PPI  She was found to have a small had a hernia on recent upper endoscopy on August 22nd  S/P bariatric surgery  Patient is status post gastric bypass in [place] about a years ago  She has had significant weight regain with a BMI over 41  She reports eating a healthy diet and being as active as stable limited by her spinal stenosis  She had a recent upper endoscopy which showed a small hiatal hernia, a large nonexcluded fundus in her gastric pouch and an enlarged gastro jejunal anastomosis  No gastrogastric fistula or marginal ulcer was found  We discussed with the patient that the nonexcluded  fundus can contribute to her weight regain  She will need a revision surgery with a paraesophageal hernia repair,  partial gastrectomy and resection of non excluded fundus and creation of new gastrojejunal anastomosis  We explained to her that due to the revisional nature, she has   2 times higher risk of complications doing the surgery including bleeding ,bowel injury and anastomotic leak  We also explained to her that the weight loss she will have with the revision will not be as significant or sub optimal compared to the weight loss she had during her original gastric bypass  She will see our dietitian and  in preparation for her revisional surgery  Diagnoses and all orders for this visit:    Chronic GERD    S/P bariatric surgery          Subjective:      Patient ID: Brian Roberts is a 48 y o  female  Patient is a 59-year-old female with a history of gastric bypass in [place] about a years ago  Over the last few years patient reports mild to moderate episodes of esophageal reflux as well as weight regain  She is currently morbidly obese with a BMI above 40  She reports eating healthy and doing mild exercise limited by her spinal stenosis    She had a recent upper endoscopy without which showed a small hiatal hernia as well as a large non excluded fundus at the gastric pouch  There were no marginal ulcers or gastrogastric fistula  Would like to have her hernia fixed to help with a reflux as well as revise her nonexcluded the fundus  The following portions of the patient's history were reviewed and updated as appropriate: allergies, current medications, past family history, past medical history, past social history, past surgical history and problem list     Review of Systems   Constitutional: Negative  HENT: Negative  Eyes: Negative  Respiratory: Negative  Cardiovascular: Negative  Gastrointestinal:        Patient reports persistent esophageal acid reflux   Endocrine: Negative  Genitourinary: Negative  Musculoskeletal: Positive for back pain  Objective:      /80 (BP Location: Left arm, Patient Position: Sitting, Cuff Size: Adult)   Pulse 68   Temp 97 7 °F (36 5 °C) (Tympanic)   Ht 5' 7 5" (1 715 m)   Wt 123 kg (271 lb 8 oz)   BMI 41 90 kg/m²          Physical Exam   Constitutional: She is oriented to person, place, and time  She appears well-developed  No distress  HENT:   Head: Normocephalic and atraumatic  Mouth/Throat: Oropharyngeal exudate present  Eyes: Conjunctivae are normal  No scleral icterus  Cardiovascular: Normal rate and regular rhythm  Pulmonary/Chest: Effort normal and breath sounds normal    Abdominal: Soft  She exhibits no distension  There is no tenderness  Neurological: She is alert and oriented to person, place, and time  Skin: She is not diaphoretic  Psychiatric: She has a normal mood and affect   Her behavior is normal

## 2018-09-07 NOTE — ASSESSMENT & PLAN NOTE
Patient is status post gastric bypass in [place] about a years ago  She has had significant weight regain with a BMI over 41  She reports eating a healthy diet and being as active as stable limited by her spinal stenosis  She had a recent upper endoscopy which showed a small hiatal hernia, a large nonexcluded fundus in her gastric pouch and an enlarged gastro jejunal anastomosis  No gastrogastric fistula or marginal ulcer was found  We discussed with the patient that the nonexcluded  fundus can contribute to her weight regain  She will need a revision surgery with a paraesophageal hernia repair,  partial gastrectomy and resection of non excluded fundus and creation of new gastrojejunal anastomosis  We explained to her that due to the revisional nature, she has   2 times higher risk of complications doing the surgery including bleeding ,bowel injury and anastomotic leak  We also explained to her that the weight loss she will have with the revision will not be as significant or sub optimal compared to the weight loss she had during her original gastric bypass  She will see our dietitian and  in preparation for her revisional surgery

## 2018-09-09 DIAGNOSIS — F51.01 PRIMARY INSOMNIA: Primary | ICD-10-CM

## 2018-09-09 RX ORDER — TEMAZEPAM 15 MG/1
15 CAPSULE ORAL
Qty: 30 CAPSULE | Refills: 1 | Status: SHIPPED | OUTPATIENT
Start: 2018-09-09 | End: 2018-10-02 | Stop reason: SDUPTHER

## 2018-09-17 ENCOUNTER — CLINICAL SUPPORT (OUTPATIENT)
Dept: BARIATRICS | Facility: CLINIC | Age: 53
End: 2018-09-17

## 2018-09-17 VITALS — BODY MASS INDEX: 41.25 KG/M2 | WEIGHT: 272.2 LBS | HEIGHT: 68 IN

## 2018-09-17 DIAGNOSIS — Z86.2 HISTORY OF ANEMIA: ICD-10-CM

## 2018-09-17 DIAGNOSIS — K91.2 POSTGASTRECTOMY MALABSORPTION: ICD-10-CM

## 2018-09-17 DIAGNOSIS — Z98.84 BARIATRIC SURGERY STATUS: ICD-10-CM

## 2018-09-17 DIAGNOSIS — E66.01 OBESITY, CLASS III, BMI 40-49.9 (MORBID OBESITY) (HCC): Primary | ICD-10-CM

## 2018-09-17 DIAGNOSIS — E66.01 MORBID (SEVERE) OBESITY DUE TO EXCESS CALORIES (HCC): ICD-10-CM

## 2018-09-17 DIAGNOSIS — K21.9 GASTROESOPHAGEAL REFLUX DISEASE WITHOUT ESOPHAGITIS: Primary | ICD-10-CM

## 2018-09-17 DIAGNOSIS — Z90.3 POSTGASTRECTOMY MALABSORPTION: ICD-10-CM

## 2018-09-17 DIAGNOSIS — Z98.84 S/P BARIATRIC SURGERY: ICD-10-CM

## 2018-09-17 DIAGNOSIS — Z98.84 S/P BARIATRIC SURGERY: Primary | ICD-10-CM

## 2018-09-17 PROCEDURE — RECHECK: Performed by: DIETITIAN, REGISTERED

## 2018-09-17 NOTE — PROGRESS NOTES
Bariatric Behavioral Health Evaluation    Presenting Problem: Patient is being considered for a revision  Patient had gastric bypass surgery in  in [place]     Is the patient seeking Bariatric Surgery Eval? Yes  If yes how long have you researched this surgery option  In 2016 patient started with weight regain; after  Mother's death and 's sudden illness  Realizes Post- Op Requirements? Yes     Psychiatric/Psychological Treatment Diagnosis: Patient diagnosed with depression( ) and anxiety( ) by PCP  Patient monitored and treated by PCP( quarterly)  Outpatient Counselor No Counselor     Psychiatrist No     Have you had Inpatient Treatment? No    Family Constellation (include relationship with each and Psych/Med HX) Family history of depression( maternal and paternal)     Mother [de-identified] 80years old, father  by suicide @49 years old,  6 years, and 1 step son shopping  Domestic Violence No    Abuse History:   None reported     Additional comments/stressors related to family/relationships/peer support: current health condition  Physical/Psychological Assessment:     Appearance: appropriate  Sociability: average  Affect: appropriate  Mood: calm  Thought Process: coherent  Speech: normal  Content: no impairment  Orientation: person  Yes   Insight: emotional  good    Risk Assessment:     none    Recommendations: Recommended for surgery  yes    Risk of Harm to Self or Others: None reported     Access to weapons no     Based on the previous information, the client presents the following risk of harm to self or others: low     Note   Devin Iyer Completed Behavioral Health Assessment  Provided patient education as needed  Patient admits  to  Axis 1 diagnosis and is currently taking medication prescribed by PCP  Patient  meets criteria for [facility] bariatric  surgery program and is therefore referred to surgeon       BARIATRIC SURGERY EDUCATION CHECKLIST    I have received education related to my bariatric surgery process and understand:    Patients may be required to complete a psychiatric evaluation and receive clearance for surgery from their psychiatrist     Patients who undergo weight loss surgery are at higher risk of increased mental health concerns and suicide attempts  Patients may be required to complete a full substance abuse evaluation and then complete all treatment recommendations prior to surgery  If diagnosis of abuse/dependence results, patient may be required to remain sober for one (1) year before having bariatric surgery  Patients on psychiatric medications should check with their provider to discuss psychiatric medications and the changes in absorption  Patient should discuss all time release medications with provider and take all medications as prescribed  The recommendation is that there is no use of  any tobacco products, Hookah or  vapes for the bariatric post-operation patient  Bariatric surgery patients should not consume alcohol as a post-operative patient as it may increase risk of numerous health conditions including but not limited to alcohol abuse and ulcers  There is a possibility of weight regain if patient does not follow all program guidelines and recommendations  Bariatric surgery patients should exercise thirty (30) to sixty (60) minutes per day to maintain post-surgical weight loss  Research indicates that bariatric patients are more successful when they see a therapist for up to two (2) years post-op  Patients will follow all medical and dietary recommendations provided  Patient will keep all scheduled appointments and follow up with their physician for a minimum of five (5) years  Patient will take all vitamins as recommended  Post-operative vitamins are life-long  Patient reviewed Bariatric Surgery Education Checklist and agrees they have received education on these issues

## 2018-09-17 NOTE — PROGRESS NOTES
Bariatric Nutrition Assessment Note    Type of surgery    Gastric bypass: laparoscopic  Surgery Date: 2009 in [place]  9 years  post-op  Surgeon: Dr Donahue Corporal  48 y o   female     Wt with BMI of 25: 161 2lbs  Pre-Op Excess Wt: 111lbs  Height 5' 7 5" (1 715 m), weight 123 kg (272 lb 3 2 oz)  Body mass index is 42 kg/m²  Initial weight prior to initial Albert-En-Y in 2009 was 300lbs  Pt reached a low weight of 190lbs after her surgery  Pt has regained 82 2lbs  Weight History   Onset of Obesity: Childhood  Family history of obesity: Yes  Wt Loss Attempts: Commercial Programs ([employer], [employer]ida Canary, etc )  Counseling with  MD  Exercise  High Protein/Low CHO diets ([place], [facility], etc )  Meal Replacements (Medifast, Slim Fast, etc )  Nutrition Counseling with RD  Maximum Wt Lost: 100lbs    Review of History and Medications   Past Medical History:   Diagnosis Date    Anemia     Anxiety     Asthma     Depression     Disease of thyroid gland     Edema     GERD (gastroesophageal reflux disease)     History of anemia     History of edema     Insomnia     Morbid obesity ([address] )     Postgastrectomy malabsorption     Spinal stenosis      Past Surgical History:   Procedure Laterality Date    CARPAL TUNNEL RELEASE      CHOLECYSTECTOMY      GASTRIC BYPASS      NEUROMA EXCISION      single Dewitt's neuroma    [place] EGD TRANSORAL BIOPSY SINGLE/MULTIPLE N/A 8/22/2018    Procedure: ESOPHAGOGASTRODUODENOSCOPY (EGD); Surgeon: Cookie Em MD;  Location: [facility];   Service: Bariatrics    SHOULDER SURGERY      TONSILLECTOMY      TOTAL HIP ARTHROPLASTY Left     TOTAL KNEE ARTHROPLASTY       Social History     Social History    Marital status: /Civil Union     Spouse name: N/A    Number of children: N/A    Years of education: N/A     Social History Main Topics    Smoking status: Never Smoker    Smokeless tobacco: Never Used    Alcohol use Yes Comment: social     Drug use: No    Sexual activity: Not Currently     Partners: Male     Other Topics Concern    Not on file     Social History Narrative    No narrative on file       Current Outpatient Prescriptions:     carisoprodol (SOMA) 350 mg tablet, Take 1 tablet by mouth 2 (two) times a day as needed, Disp: , Rfl:     escitalopram (LEXAPRO) 20 mg tablet, Take 1 tablet by mouth daily, Disp: , Rfl:     levothyroxine 50 mcg tablet, Take 1 tablet (50 mcg total) by mouth daily, Disp: 30 tablet, Rfl: 5    lisinopril (ZESTRIL) 20 mg tablet, TAKE ONE TABLET BY MOUTH DAILY, Disp: 30 tablet, Rfl: 5    LORazepam (ATIVAN) 1 mg tablet, Take 1 tablet by mouth 2 (two) times a day as needed, Disp: , Rfl:     pantoprazole (PROTONIX) 40 mg tablet, Take 1 tablet by mouth daily, Disp: , Rfl:     spironolactone (ALDACTONE) 50 mg tablet, Take 1 tablet by mouth daily, Disp: , Rfl:     temazepam (RESTORIL) 15 mg capsule, Take 1 capsule (15 mg total) by mouth daily at bedtime as needed for sleep, Disp: 30 capsule, Rfl: 1  Food Intake and Lifestyle Assessment   Food Intake Assessment completed via food log brought by patient  Breakfast: turkey and egg whites pocket (2), 1-2 cup coffee with sugar free creamer  Snack: none   Lunch: sushi or egg drop soup, cold salad  Snack: none  Dinner: chicken or lean pork in croc-pot, oven or pan quevedo, soups, 1-2 glasswine  Snack: none  Beverage intake: water, sugar free beverages, coffee/tea and alcohol  Protein supplement: none  Estimated protein intake per day: 30-60g  Estimated fluid intake per day: 64-80oz  Meals eaten away from home: 1  Typical meal pattern: 3 meals per day and 0 snacks per day  Eating Behaviors: Emotional eating and Craves salty foods  Food allergies or intolerances:    Allergies   Allergen Reactions    Iodine     Medical Tape     Meperidine     Sulfate      Cultural or Voodoo considerations:  foods    Physical Assessment  Physical Activity  Types of exercise: None  Current physical limitations: back pain, hip replacement, knee replacement, possible knee replacement in future    Psychosocial Assessment   Support systems: significant other  Socioeconomic factors: none    Nutrition Diagnosis  Diagnosis: Overweight / Obesity (NC-3 3) and Altered GI function (NC-1 4)  Related to: Physical inactivity and Altered GI function  As Evidenced by: BMI >25, Expected anthropometric outcomes are not achieved, Unintentional weight gain and s/p RNY GBP, UGI and EGD show small hiatal hernia, large non-excluded fundus, enlarged G-J anastomosis  Nutrition Prescription: Recommend the following diet  Low fat, Low sugar, High protein and Regular    Interventions and Teaching   Discussed pre-op and post-op nutrition guidelines  Patient educated and handouts provided    Surgical changes to stomach / GI  Capacity of post-surgery stomach  Diet progression  Adequate hydration  Sugar and fat restriction to decrease "dumping syndrome"  Fat restriction to decrease steatorrhea  Expected weight loss  Weight loss plateaus/ possibility of weight regain  Exercise  Suggestions for pre-op diet  Nutrition considerations after surgery  Protein supplements  Meal planning and preparation  Appropriate carbohydrate, protein, and fat intake, and food/fluid choices to maximize safe weight loss, nutrient intake, and tolerance   Dietary and lifestyle changes  Possible problems with poor eating habits  Techniques for self monitoring and keeping daily food journal  Potential for food intolerance after surgery, and ways to deal with them including: lactose intolerance, nausea, reflux, vomiting, diarrhea, food intolerance, appetite changes, gas  Vitamin / Mineral supplementation of Multivitamin with minerals, Calcium, Vitamin B12 and Iron    Education provided to: patient and spouse    Barriers to learning: No barriers identified  Readiness to change: preparation and action    Prior research on procedure: discussed with provider and previous wt  loss surgery    Comprehension: verbalizes understanding     Expected Compliance: good  Recommendations  Pt is an appropriate candidate for surgery  Yes    Evaluation / Monitoring  Dietitian to Monitor: Eating pattern as discussed Tolerance of nutrition prescription Body weight Lab values Physical activity Bowel pattern    Patient is a 48year old who is here for nutritional evaluation for weight loss surgery  I reviewed co-morbidities and medications with patient  She first recalls having problems with weight gain as a child  She has dieted in the past with variable success but would regain the weight back  She had a laparascopic Albert-En-Y Gastric Bypass surgery in [place] in 2009  Pt weighed 300lbs prior to surgery and reached a low weight of 190lbs  Pt has since regained about 80lbs  Pt reports significant stress in the past three years which have contributed to her weight regain, as well as sedentary lifestyle due to spinal stenosis, knee and hip pain  For her personal pre-op goals she will: begin using the [employer] monica, eliminate alcoholic beverages and carbonated beverages, and begin taking bariatric-formula vitamins and minerals as discussed  Provided pt with information on [employer] monica  She was instructed on the importance of consistent vitamin and mineral intake after her surgery to prevent deficiencies  She currently takes a One-A-Day women's 50+ multivitamin twice daily  Pt also reports history of anemia which was unresponsive to pill or chewable iron supplements  Ordered standard post-RNY bloodwork for pt  Instructed pt to take bariatric vitamins/minerals as directed for at least a month prior to getting levels checked  Reviewed importance of support after surgery and discussed the [employer] monica, [employer], PEP rally, and Support Group   Patient states adequate knowledge of nutrition, exercise, and behavior modification required for long term success  Pt  is recommended for surgery and is aware to practice lifestyle modification, complete all six lesson plans in bariatric manual, and complete two-week liver shrinking diet prior to surgery  Provided patient with samples of bariatric products to try prior to surgery      Goals  Food journal, Exercise 30 minutes 5 times per week, Complete lession plans 1-6, Eat 3 meals per day, Eliminate mindless snacking and eliminate carbonated and alcoholic beverages      Time Spent:   1 Hour 30 Minutes

## 2018-09-18 RX ORDER — PANTOPRAZOLE SODIUM 40 MG/1
TABLET, DELAYED RELEASE ORAL
Qty: 30 TABLET | Refills: 5 | Status: SHIPPED | OUTPATIENT
Start: 2018-09-18 | End: 2019-04-06 | Stop reason: SDUPTHER

## 2018-09-26 DIAGNOSIS — F41.9 ANXIETY: ICD-10-CM

## 2018-09-26 RX ORDER — LORAZEPAM 1 MG/1
TABLET ORAL
Qty: 30 TABLET | Refills: 1 | Status: SHIPPED | OUTPATIENT
Start: 2018-09-26 | End: 2018-10-09 | Stop reason: SDUPTHER

## 2018-10-02 DIAGNOSIS — F51.01 PRIMARY INSOMNIA: ICD-10-CM

## 2018-10-02 RX ORDER — TEMAZEPAM 15 MG/1
CAPSULE ORAL
Qty: 60 CAPSULE | Refills: 0 | Status: SHIPPED | OUTPATIENT
Start: 2018-10-02 | End: 2018-11-05 | Stop reason: SDUPTHER

## 2018-10-06 ENCOUNTER — APPOINTMENT (OUTPATIENT)
Dept: LAB | Age: 53
End: 2018-10-06
Payer: COMMERCIAL

## 2018-10-06 DIAGNOSIS — Z86.2 HISTORY OF ANEMIA: ICD-10-CM

## 2018-10-06 DIAGNOSIS — Z90.3 POSTGASTRECTOMY MALABSORPTION: ICD-10-CM

## 2018-10-06 DIAGNOSIS — K91.2 POSTGASTRECTOMY MALABSORPTION: ICD-10-CM

## 2018-10-06 DIAGNOSIS — Z98.84 BARIATRIC SURGERY STATUS: ICD-10-CM

## 2018-10-06 DIAGNOSIS — Z98.84 S/P BARIATRIC SURGERY: ICD-10-CM

## 2018-10-06 DIAGNOSIS — E66.01 MORBID (SEVERE) OBESITY DUE TO EXCESS CALORIES (HCC): ICD-10-CM

## 2018-10-06 LAB
25(OH)D3 SERPL-MCNC: 28.8 NG/ML (ref 30–100)
ALBUMIN SERPL BCP-MCNC: 3.4 G/DL (ref 3.5–5)
ALP SERPL-CCNC: 83 U/L (ref 46–116)
ALT SERPL W P-5'-P-CCNC: 27 U/L (ref 12–78)
ANION GAP SERPL CALCULATED.3IONS-SCNC: 4 MMOL/L (ref 4–13)
AST SERPL W P-5'-P-CCNC: 23 U/L (ref 5–45)
BILIRUB SERPL-MCNC: 0.69 MG/DL (ref 0.2–1)
BUN SERPL-MCNC: 12 MG/DL (ref 5–25)
CALCIUM SERPL-MCNC: 8.4 MG/DL (ref 8.3–10.1)
CHLORIDE SERPL-SCNC: 105 MMOL/L (ref 100–108)
CHOLEST SERPL-MCNC: 132 MG/DL (ref 50–200)
CO2 SERPL-SCNC: 29 MMOL/L (ref 21–32)
CREAT SERPL-MCNC: 0.81 MG/DL (ref 0.6–1.3)
ERYTHROCYTE [DISTWIDTH] IN BLOOD BY AUTOMATED COUNT: 13 % (ref 11.6–15.1)
FERRITIN SERPL-MCNC: 23 NG/ML (ref 8–388)
FOLATE SERPL-MCNC: >20 NG/ML (ref 3.1–17.5)
GFR SERPL CREATININE-BSD FRML MDRD: 83 ML/MIN/1.73SQ M
GLUCOSE P FAST SERPL-MCNC: 101 MG/DL (ref 65–99)
HCT VFR BLD AUTO: 38.7 % (ref 34.8–46.1)
HDLC SERPL-MCNC: 65 MG/DL (ref 40–60)
HGB BLD-MCNC: 12.3 G/DL (ref 11.5–15.4)
IRON SATN MFR SERPL: 21 %
IRON SERPL-MCNC: 80 UG/DL (ref 50–170)
LDLC SERPL CALC-MCNC: 57 MG/DL (ref 0–100)
MCH RBC QN AUTO: 31.7 PG (ref 26.8–34.3)
MCHC RBC AUTO-ENTMCNC: 31.8 G/DL (ref 31.4–37.4)
MCV RBC AUTO: 100 FL (ref 82–98)
NONHDLC SERPL-MCNC: 67 MG/DL
PLATELET # BLD AUTO: 267 THOUSANDS/UL (ref 149–390)
PMV BLD AUTO: 9.7 FL (ref 8.9–12.7)
POTASSIUM SERPL-SCNC: 4.3 MMOL/L (ref 3.5–5.3)
PROT SERPL-MCNC: 7.1 G/DL (ref 6.4–8.2)
PTH-INTACT SERPL-MCNC: 78 PG/ML (ref 18.4–80.1)
RBC # BLD AUTO: 3.88 MILLION/UL (ref 3.81–5.12)
SODIUM SERPL-SCNC: 138 MMOL/L (ref 136–145)
TIBC SERPL-MCNC: 389 UG/DL (ref 250–450)
TRIGL SERPL-MCNC: 48 MG/DL
VIT B12 SERPL-MCNC: 823 PG/ML (ref 100–900)
WBC # BLD AUTO: 5.86 THOUSAND/UL (ref 4.31–10.16)

## 2018-10-06 PROCEDURE — 82525 ASSAY OF COPPER: CPT

## 2018-10-06 PROCEDURE — 83970 ASSAY OF PARATHORMONE: CPT

## 2018-10-06 PROCEDURE — 84425 ASSAY OF VITAMIN B-1: CPT

## 2018-10-06 PROCEDURE — 84630 ASSAY OF ZINC: CPT

## 2018-10-06 PROCEDURE — 80053 COMPREHEN METABOLIC PANEL: CPT

## 2018-10-06 PROCEDURE — 36415 COLL VENOUS BLD VENIPUNCTURE: CPT

## 2018-10-06 PROCEDURE — 82746 ASSAY OF FOLIC ACID SERUM: CPT

## 2018-10-06 PROCEDURE — 82607 VITAMIN B-12: CPT

## 2018-10-06 PROCEDURE — 80061 LIPID PANEL: CPT

## 2018-10-06 PROCEDURE — 84590 ASSAY OF VITAMIN A: CPT

## 2018-10-06 PROCEDURE — 83550 IRON BINDING TEST: CPT

## 2018-10-06 PROCEDURE — 85027 COMPLETE CBC AUTOMATED: CPT

## 2018-10-06 PROCEDURE — 83540 ASSAY OF IRON: CPT

## 2018-10-06 PROCEDURE — 82306 VITAMIN D 25 HYDROXY: CPT

## 2018-10-06 PROCEDURE — 82728 ASSAY OF FERRITIN: CPT

## 2018-10-10 ENCOUNTER — OFFICE VISIT (OUTPATIENT)
Dept: CARDIOLOGY CLINIC | Facility: CLINIC | Age: 53
End: 2018-10-10
Payer: COMMERCIAL

## 2018-10-10 VITALS
WEIGHT: 274 LBS | BODY MASS INDEX: 41.52 KG/M2 | DIASTOLIC BLOOD PRESSURE: 90 MMHG | HEIGHT: 68 IN | SYSTOLIC BLOOD PRESSURE: 144 MMHG | HEART RATE: 61 BPM

## 2018-10-10 DIAGNOSIS — I10 HYPERTENSION, UNSPECIFIED TYPE: ICD-10-CM

## 2018-10-10 DIAGNOSIS — Z01.810 PREOPERATIVE CARDIOVASCULAR EXAMINATION: Primary | ICD-10-CM

## 2018-10-10 LAB
COPPER SERPL-MCNC: 139 UG/DL (ref 72–166)
VIT A SERPL-MCNC: 42.2 UG/DL (ref 33.1–100)
VIT B1 BLD-SCNC: 166.3 NMOL/L (ref 66.5–200)
ZINC SERPL-MCNC: 74 UG/DL (ref 56–134)

## 2018-10-10 PROCEDURE — 93000 ELECTROCARDIOGRAM COMPLETE: CPT | Performed by: STUDENT IN AN ORGANIZED HEALTH CARE EDUCATION/TRAINING PROGRAM

## 2018-10-10 PROCEDURE — 99214 OFFICE O/P EST MOD 30 MIN: CPT | Performed by: STUDENT IN AN ORGANIZED HEALTH CARE EDUCATION/TRAINING PROGRAM

## 2018-10-10 RX ORDER — SPIRONOLACTONE 50 MG/1
50 TABLET, FILM COATED ORAL DAILY
Qty: 90 TABLET | Refills: 3 | Status: SHIPPED | OUTPATIENT
Start: 2018-10-10 | End: 2018-11-21 | Stop reason: HOSPADM

## 2018-10-10 NOTE — PROGRESS NOTES
Cardiology Consultation     Alaina Brochure  [id number]  1965  HEART & VASCULAR [facility] [place], [place] [facility] [address]      1  Hypertension, unspecified type  POCT ECG    spironolactone (ALDACTONE) 50 mg tablet       Discussion/Summary: 49-year-old female with past medical history significant for hypertension, obesity with history of gastric bypass in 2009 in [place]  Patient is scheduled tentatively for a gastric bypass revision by Dr Jen Garcia- paraesophageal hernia repair, partial gastrectomy and dissection with creation of new gastrojejunal anastomosis  Patient is here for preoperative cardiac risk stratification  1  Preoperative risk stratification for gastric bypass revision  - patient is functional at baseline with no limitations  She is able to walk a flight of stairs and do her daily activities and achieve >4METS with no limitations  - Her RCRI is 1 which translates to 1% risk of cardiac event during surgery  She is at acceptable risk for the procedure and no further cardiovascular testing is required prior to surgery  - EKG in the office-normal sinus rhythm  Nonspecific T-wave changes  2  Hypertension  - blood pressure in the office today is elevated at 140/90  Her last blood pressure during the clinic visit available for review is from July 2018 at which time her blood pressure was 100/60 on lisinopril and spironolactone  - she also reports being anxious for the visit  - was on lisinopril 20 mg daily, spironolactone 50 mg daily (which she was taking for cystic acne) but stopped taking her Spironolactone as she ran out of them few weeks ago  - Will restart Spironolactone and monitor BP   - recent blood work from 10/06 reviewed  - lipid panel-10/6- LDL 57, HDL 65  Follow-up as needed        History of Present Illness:  49-year-old female with past medical history significant for hypertension, obesity with history of gastric bypass in 2009 in [place], hypothyroidism  Patient is scheduled tentatively for a gastric bypass revision by Dr Anand Aaron- paraesophageal hernia repair, partial gastrectomy and dissection with creation of new gastrojejunal anastomosis  Patient is here for preoperative cardiac risk assessment  She has no prior cardiac history  No cardiac symptoms of chest pain or pressure  No shortness of breath at rest or with exertion  Able to walk a flight of stairs with no limitations  No swelling of the lower extremities  No palpitations  No dizziness or lightheadedness  No syncope  Very functional at baseline, 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  No smoking  Drinks a glass of wine every night  No drug history  Family history of CAD in mother with started at the age of 67  Patient Active Problem List   Diagnosis    Anxiety and depression    Asthma, well controlled    Benign hypertension    Chronic GERD    Cystic acne    Insomnia    Spinal stenosis    S/P bariatric surgery    Bariatric surgery status     Past Medical History:   Diagnosis Date    Anemia     Anxiety     Asthma     Depression     Disease of thyroid gland     Edema     GERD (gastroesophageal reflux disease)     History of anemia     History of edema     Insomnia     Morbid obesity ([address] )     Postgastrectomy malabsorption     Spinal stenosis      Social History     Social History    Marital status: /Civil Union     Spouse name: N/A    Number of children: N/A    Years of education: N/A     Occupational History    Not on file       Social History Main Topics    Smoking status: Never Smoker    Smokeless tobacco: Never Used    Alcohol use Yes      Comment: social     Drug use: No    Sexual activity: Not Currently     Partners: Male     Other Topics Concern    Not on file     Social History Narrative    No narrative on file      Family History   Problem Relation Age of Onset    Hypertension Mother    Reinierestelle Pollack Other Mother         cardiac disorder    Diabetes type II Mother         without complication, with long term use of insulin    Depression Father      Past Surgical History:   Procedure Laterality Date    CARPAL TUNNEL RELEASE      CHOLECYSTECTOMY      GASTRIC BYPASS      NEUROMA EXCISION      single Dewitt's neuroma    DC EGD TRANSORAL BIOPSY SINGLE/MULTIPLE N/A 8/22/2018    Procedure: ESOPHAGOGASTRODUODENOSCOPY (EGD); Surgeon: Kamar Lloyd MD;  Location: [facility];   Service: Bariatrics    SHOULDER SURGERY      TONSILLECTOMY      TOTAL HIP ARTHROPLASTY Left     TOTAL KNEE ARTHROPLASTY         Current Outpatient Prescriptions:     carisoprodol (SOMA) 350 mg tablet, Take 1 tablet by mouth as needed  , Disp: , Rfl:     escitalopram (LEXAPRO) 20 mg tablet, Take 1 tablet by mouth daily, Disp: , Rfl:     levothyroxine 50 mcg tablet, Take 1 tablet (50 mcg total) by mouth daily, Disp: 30 tablet, Rfl: 5    lisinopril (ZESTRIL) 20 mg tablet, TAKE ONE TABLET BY MOUTH DAILY, Disp: 30 tablet, Rfl: 5    pantoprazole (PROTONIX) 40 mg tablet, TAKE ONE TABLET BY MOUTH ONCE DAILY, Disp: 30 tablet, Rfl: 5    spironolactone (ALDACTONE) 50 mg tablet, Take 1 tablet (50 mg total) by mouth daily, Disp: 90 tablet, Rfl: 3    temazepam (RESTORIL) 15 mg capsule, Take one or two capsules at bedtime as needed for sleep, Disp: 60 capsule, Rfl: 0  Allergies   Allergen Reactions    Iodine     Medical Tape     Meperidine     Sulfate          Labs:  Appointment on 10/06/2018   Component Date Value    WBC 10/06/2018 5 86     RBC 10/06/2018 3 88     Hemoglobin 10/06/2018 12 3     Hematocrit 10/06/2018 38 7     MCV 10/06/2018 100*    MCH 10/06/2018 31 7     MCHC 10/06/2018 31 8     RDW 10/06/2018 13 0     Platelets 62/76/4611 267     MPV 10/06/2018 9 7     Sodium 10/06/2018 138     Potassium 10/06/2018 4 3     Chloride 10/06/2018 105     CO2 10/06/2018 29     ANION GAP 10/06/2018 4     BUN 10/06/2018 12     Creatinine 10/06/2018 0 81     Glucose, Fasting 10/06/2018 101*    Calcium 10/06/2018 8 4     AST 10/06/2018 23     ALT 10/06/2018 27     Alkaline Phosphatase 10/06/2018 83     Total Protein 10/06/2018 7 1     Albumin 10/06/2018 3 4*    Total Bilirubin 10/06/2018 0 69     eGFR 10/06/2018 83     Cholesterol 10/06/2018 132     Triglycerides 10/06/2018 48     HDL, Direct 10/06/2018 65*    LDL Calculated 10/06/2018 57     Non-HDL-Chol (CHOL-HDL) 10/06/2018 67     Vitamin A 10/06/2018 42 2     Vit D, 25-Hydroxy 10/06/2018 28 8*    PTH 10/06/2018 78 0     Vitamin B1, Whole Blood 10/06/2018 166 3     Vitamin B-12 10/06/2018 823     Folate 10/06/2018 >20 0*    Ferritin 10/06/2018 23     Iron Saturation 10/06/2018 21     TIBC 10/06/2018 389     Iron 10/06/2018 80     Copper 10/06/2018 139     Zinc 10/06/2018 74    Orders Only on 07/28/2018   Component Date Value    SL AMB TSH W/ REFLEX TO * 07/28/2018 1 55    Orders Only on 05/22/2018   Component Date Value    Total Cholesterol 05/22/2018 156     HDL 05/22/2018 75     Triglycerides 05/22/2018 55     LDL Direct 05/22/2018 67     Chol HDLC Ratio 05/22/2018 2 1     Non-HDL Cholesterol 05/22/2018 81     Glucose 05/22/2018 103*    BUN 05/22/2018 14     Creatinine 05/22/2018 0 92     eGFR Non  05/22/2018 71     SL AMB EGFR  AMER* 05/22/2018 82     SL AMB BUN/CREATININE RA* 69/96/7133 NOT APPLICABLE     Sodium 21/42/4556 137     SL AMB POTASSIUM 05/22/2018 5 0     Chloride 05/22/2018 101     CO2 05/22/2018 29     SL AMB CALCIUM 05/22/2018 9 4     SL AMB PROTEIN, TOTAL 05/22/2018 7 2     Albumin 05/22/2018 4 3     Globulin 05/22/2018 2 9     SL AMB ALBUMIN/GLOBULIN * 05/22/2018 1 5     TOTAL BILIRUBIN 05/22/2018 0 8     Alkaline Phosphatase 05/22/2018 72     SL AMB AST 05/22/2018 22     SL AMB ALT 05/22/2018 17     White Blood Cell Count 05/22/2018 5 9     Red Blood Cell Count 05/22/2018 4 05     Hemoglobin 05/22/2018 12 8     HCT 05/22/2018 39 3     MCV 05/22/2018 97 0     MCH 05/22/2018 31 6     MCHC 05/22/2018 32 6     RDW 05/22/2018 12 7     Platelet Count 36/08/2467 262     SL AMB MPV 05/22/2018 9 6     Neutrophils (Absolute) 05/22/2018 4136     Lymphocytes (Absolute) 05/22/2018 1227     Monocytes (Absolute) 05/22/2018 407     Eosinophils (Absolute) 05/22/2018 100     Basophils (Absolute) 05/22/2018 30     Neutrophils 05/22/2018 70 1     Lymphocytes 05/22/2018 20 8     Monocytes 05/22/2018 6 9     Eosinophils 05/22/2018 1 7     Basophils Relative 05/22/2018 0 5     Thyroglobulin Ab 05/22/2018 34*    Thyroid Peroxidase Antib* 05/22/2018 208*    SL AMB TSH W/ REFLEX TO * 05/22/2018 4 60*    Free t4 05/22/2018 1 0         Imaging: No results found  ECG:  Normal sinus rhythm  Nonspecific T-wave changes  Review of Systems:  Review of Systems   Constitutional: Negative for activity change, appetite change, chills, diaphoresis, fatigue and fever  HENT: Negative for congestion  Respiratory: Negative for cough, chest tightness, shortness of breath and wheezing  Cardiovascular: Negative for chest pain, palpitations and leg swelling  Gastrointestinal: Negative for abdominal pain, diarrhea, nausea and vomiting  Genitourinary: Negative for difficulty urinating and dysuria  Musculoskeletal: Negative for back pain  Skin: Negative for color change and rash  Neurological: Negative for dizziness, syncope, facial asymmetry, weakness, light-headedness, numbness and headaches  Psychiatric/Behavioral: Negative for confusion           Vitals:    10/10/18 1604   BP: 144/90   BP Location: Right arm   Patient Position: Sitting   Cuff Size: Large   Pulse: 61   Weight: 124 kg (274 lb)   Height: 5' 8" (1 727 m)     Vitals:    10/10/18 1604   Weight: 124 kg (274 lb)     Height: 5' 8" (172 7 cm)     Physical Exam:  General appearance:  Appears stated age, alert, well appearing and in no distress  HEENT:  PERRLA, EOMI, no scleral icterus, no conjunctival pallor  NECK:  Supple, No elevated JVP, no thyromegaly, no carotid bruits  HEART:  Regular rate and rhythm, normal S1/S2, no S3/S4, no murmur or rub  LUNGS:  Clear to auscultation bilaterally  ABDOMEN:  Soft, non-tender, positive bowel sounds, no rebound or guarding, no organomegaly   EXTREMITIES:  No edema  VASCULAR:  Normal pedal pulses   SKIN: No lesions or rashes on exposed skin  NEURO:  CN II-XII intact, no focal deficits

## 2018-10-17 PROBLEM — J45.909 EXTRINSIC ASTHMA: Status: ACTIVE | Noted: 2018-10-17

## 2018-10-17 PROBLEM — K21.9 GASTROESOPHAGEAL REFLUX DISEASE: Status: ACTIVE | Noted: 2018-10-17

## 2018-10-17 PROBLEM — K44.9 PARAESOPHAGEAL HERNIA: Status: ACTIVE | Noted: 2018-10-17

## 2018-10-17 PROBLEM — D64.9 ANEMIA: Status: ACTIVE | Noted: 2018-10-17

## 2018-10-31 ENCOUNTER — ANESTHESIA EVENT (OUTPATIENT)
Dept: PERIOP | Facility: HOSPITAL | Age: 53
DRG: 327 | End: 2018-10-31
Payer: COMMERCIAL

## 2018-11-01 ENCOUNTER — OFFICE VISIT (OUTPATIENT)
Dept: BARIATRICS | Facility: CLINIC | Age: 53
End: 2018-11-01
Payer: COMMERCIAL

## 2018-11-01 VITALS
TEMPERATURE: 98.4 F | HEIGHT: 68 IN | SYSTOLIC BLOOD PRESSURE: 116 MMHG | BODY MASS INDEX: 41.07 KG/M2 | HEART RATE: 94 BPM | WEIGHT: 271 LBS | DIASTOLIC BLOOD PRESSURE: 80 MMHG

## 2018-11-01 DIAGNOSIS — E66.01 MORBID (SEVERE) OBESITY DUE TO EXCESS CALORIES (HCC): Primary | ICD-10-CM

## 2018-11-01 DIAGNOSIS — E66.01 OBESITY, CLASS III, BMI 40-49.9 (MORBID OBESITY) (HCC): Primary | ICD-10-CM

## 2018-11-01 PROCEDURE — 99214 OFFICE O/P EST MOD 30 MIN: CPT | Performed by: SURGERY

## 2018-11-01 RX ORDER — OXYCODONE HYDROCHLORIDE AND ACETAMINOPHEN 5; 325 MG/1; MG/1
1 TABLET ORAL EVERY 4 HOURS PRN
Qty: 20 TABLET | Refills: 0 | Status: SHIPPED | OUTPATIENT
Start: 2018-11-01 | End: 2019-02-25 | Stop reason: ALTCHOICE

## 2018-11-01 RX ORDER — OMEPRAZOLE 20 MG/1
20 CAPSULE, DELAYED RELEASE ORAL DAILY
Qty: 90 CAPSULE | Refills: 1 | Status: ON HOLD | OUTPATIENT
Start: 2018-11-01 | End: 2018-11-20 | Stop reason: ALTCHOICE

## 2018-11-01 RX ORDER — HEPARIN SODIUM 5000 [USP'U]/ML
5000 INJECTION, SOLUTION INTRAVENOUS; SUBCUTANEOUS
Status: CANCELLED | OUTPATIENT
Start: 2018-11-02 | End: 2018-11-03

## 2018-11-01 NOTE — ANESTHESIA PREPROCEDURE EVALUATION
Review of Systems/Medical History          Cardiovascular  Hypertension on > 1 medication,    Pulmonary  Asthma Last rescue: > 1 year ago Asthma type of rescue: PRN inhaler,        GI/Hepatic    GERD , Bariatric surgery,             Endo/Other  History of thyroid disease , hypothyroidism,      GYN       Hematology  Anemia ,     Musculoskeletal  Back pain , spinal stenosis,   Arthritis     Neurology  Negative neurology ROS      Psychology   Anxiety, Depression , being treated for depression,              Physical Exam    Airway    Mallampati score: II  TM Distance: >3 FB  Neck ROM: full     Dental   No notable dental hx     Cardiovascular  Rhythm: regular, Rate: normal, Cardiovascular exam normal    Pulmonary  Pulmonary exam normal Breath sounds clear to auscultation,     Other Findings        Anesthesia Plan  ASA Score- 3     Anesthesia Type- general with ASA Monitors  Additional Monitors:   Airway Plan: ETT  Comment: SCOP patch ordered  Plan Factors-Patient not instructed to abstain from smoking on day of procedure  Patient did not smoke on day of surgery  Induction- intravenous  Postoperative Plan-     Informed Consent- Anesthetic plan and risks discussed with patient and spouse

## 2018-11-01 NOTE — PRE-PROCEDURE INSTRUCTIONS
Pre-Surgery Instructions:   Medication Instructions    carisoprodol (SOMA) 350 mg tablet Patient was instructed by Physician and understands   escitalopram (LEXAPRO) 20 mg tablet Patient was instructed by Physician and understands   levothyroxine 50 mcg tablet Patient was instructed by Physician and understands   lisinopril (ZESTRIL) 20 mg tablet Patient was instructed by Physician and understands   pantoprazole (PROTONIX) 40 mg tablet Patient was instructed by Physician and understands   spironolactone (ALDACTONE) 50 mg tablet Patient was instructed by Physician and understands   temazepam (RESTORIL) 15 mg capsule Patient was instructed by Physician and understands  Seen by Dr Martha Gaines  Instructed to take Levothyroxine and Pantoprazole morning of surgery with sip of water  Instructed re: chlorhexidine showers per hospital policy  No aspirin, NSAIDs, or vitamins 7 days preop

## 2018-11-01 NOTE — PROGRESS NOTES
BARIATRIC HISTORY AND PHYSICAL - BARIATRIC SURGERY  Haven Saez 48 y o  female MRN: [id number]  Unit/Bed#:  Encounter: [id number]      HPI:  Haven Saez is a 48 y o  female who presents to review her preoperative workup and see if she is a good candidate to undergo a bariatric procedure  Review of Systems   Constitutional: Negative  HENT: Negative  Eyes: Negative  Respiratory: Negative  Cardiovascular: Negative  Gastrointestinal: Negative  Endocrine: Negative  Genitourinary: Negative  Musculoskeletal: Negative  Skin: Negative  Neurological: Negative  Hematological: Negative  Psychiatric/Behavioral: Negative  Historical Information   Past Medical History:   Diagnosis Date    Anemia     Anxiety     Arthritis     Asthma     Bariatric surgery status     Depression     Disease of thyroid gland     hypothyroid    Edema     GERD (gastroesophageal reflux disease)     History of anemia     History of edema     Hypertension     Insomnia     Morbid obesity (HCC)     Postgastrectomy malabsorption     Spinal stenosis      Past Surgical History:   Procedure Laterality Date    ANKLE SURGERY Bilateral     tendon repair    CARPAL TUNNEL RELEASE Bilateral     CHOLECYSTECTOMY      COLONOSCOPY      GASTRIC BYPASS      NEUROMA EXCISION      single Dewitt's neuroma    [place] EGD TRANSORAL BIOPSY SINGLE/MULTIPLE N/A 8/22/2018    Procedure: ESOPHAGOGASTRODUODENOSCOPY (EGD); Surgeon: Annie Beckett MD;  Location: [facility];   Service: Bariatrics    ROTATOR CUFF REPAIR      left    SHOULDER SURGERY      TONSILLECTOMY      TOTAL HIP ARTHROPLASTY Left     TOTAL KNEE ARTHROPLASTY Right      Social History   History   Alcohol Use    Yes     Comment: social      History   Drug Use No     History   Smoking Status    Never Smoker   Smokeless Tobacco    Never Used     Family History: non-contributory    Meds/Allergies   all medications and allergies reviewed  Allergies Allergen Reactions    Iodine      IV dye    Medical Tape     Meperidine     Sulfate        Objective     Current Vitals:   Blood Pressure: 116/80 (11/01/18 1313)  Pulse: 94 (11/01/18 1313)  Temperature: 98 4 °F (36 9 °C) (11/01/18 1313)  Temp Source: Tympanic (11/01/18 1313)  Height: 5' 7 5" (171 5 cm) (11/01/18 1313)  Weight - Scale: 123 kg (271 lb) (11/01/18 1313)  Body mass index is 41 82 kg/m²  [unfilled]    Invasive Devices          No matching active lines, drains, or airways          Physical Exam   Constitutional: She is oriented to person, place, and time  She appears well-developed  HENT:   Head: Normocephalic and atraumatic  Eyes: Conjunctivae and EOM are normal    Neck: Normal range of motion  Neck supple  Cardiovascular: Normal rate, regular rhythm, normal heart sounds and intact distal pulses  Pulmonary/Chest: Effort normal and breath sounds normal    Abdominal: Soft  Bowel sounds are normal    Musculoskeletal: Normal range of motion  Neurological: She is alert and oriented to person, place, and time  She has normal reflexes  Skin: Skin is warm and dry  Psychiatric: She has a normal mood and affect  Lab Results: I have personally reviewed pertinent lab results  Imaging: I have personally reviewed pertinent reports  EKG, Pathology, and Other Studies: I have personally reviewed pertinent reports  Code Status: [unfilled]  Advance Directive and Living Will:      Power of :    POLST:      Assessment/Plan:      The patient presented to review the preoperative workup and see if bariatric surgery is appropriate and indicated following the extensive preoperative workup and the enrollment in our weight loss program    Preoperative workup was complete  Results were reviewed with the patient including the blood work results and the endoscopy findings and the biopsy results  We also reviewed the cardiology evaluation       I believe that the patient will be a good candidate for laparoscopic MARBELLA-EN-Y GASTRIC BYPASS REVISION PATIENT HAS A HISTORY OF MARBELLA-EN-Y GASTRIC BYPASS THAT [place]FOR[place] AND WAS FOUND TO HAVE A LARGE NON EXCLUDED FUNDUS    Patient will need to start the 2 week liquid diet prior to surgery  Risks and benefits explained one more time to the patient  Alternatives to surgery and alternative forms of surgery were also explained  Post-surgical commitment and after care programs were explained  We also discussed that the [employer] robot may be available to us to use on the day of the patient procedure  and that the procedure may be performed robotically  I discussed in details the advantages and disadvantages of this approach including the potential decrease in postoperative pain because of the remote center technology  I also mentioned the lack of strong evidence for  the use of robot in bariatric patients and the potential disadvantage to patients because of the prolonged operative time  Consent was signed  Questions were answered and concerns were addressed  Patient wishes to proceed  As per  [address] [place] [address] guidelines, I had a discussion with the patient regarding his CODE STATUS in the perioperative period and the patient is level 1 or FULL CODE STATUS

## 2018-11-05 DIAGNOSIS — F51.01 PRIMARY INSOMNIA: ICD-10-CM

## 2018-11-05 RX ORDER — TEMAZEPAM 15 MG/1
CAPSULE ORAL
Qty: 60 CAPSULE | Refills: 3 | Status: SHIPPED | OUTPATIENT
Start: 2018-11-05 | End: 2019-03-07 | Stop reason: SDUPTHER

## 2018-11-15 ENCOUNTER — PATIENT MESSAGE (OUTPATIENT)
Dept: BARIATRICS | Facility: CLINIC | Age: 53
End: 2018-11-15

## 2018-11-19 RX ORDER — LORAZEPAM 1 MG/1
TABLET ORAL
COMMUNITY
Start: 2018-11-05 | End: 2019-04-17 | Stop reason: SDUPTHER

## 2018-11-20 ENCOUNTER — HOSPITAL ENCOUNTER (INPATIENT)
Facility: HOSPITAL | Age: 53
LOS: 1 days | Discharge: HOME | DRG: 327 | End: 2018-11-21
Attending: SURGERY | Admitting: SURGERY
Payer: COMMERCIAL

## 2018-11-20 ENCOUNTER — ANESTHESIA (OUTPATIENT)
Dept: PERIOP | Facility: HOSPITAL | Age: 53
DRG: 327 | End: 2018-11-20
Payer: COMMERCIAL

## 2018-11-20 DIAGNOSIS — Z98.84 BARIATRIC SURGERY STATUS: ICD-10-CM

## 2018-11-20 DIAGNOSIS — I10 BENIGN HYPERTENSION: Primary | ICD-10-CM

## 2018-11-20 DIAGNOSIS — K44.9 PARAESOPHAGEAL HERNIA: ICD-10-CM

## 2018-11-20 DIAGNOSIS — D64.9 ANEMIA, UNSPECIFIED TYPE: ICD-10-CM

## 2018-11-20 DIAGNOSIS — K21.9 GASTROESOPHAGEAL REFLUX DISEASE, ESOPHAGITIS PRESENCE NOT SPECIFIED: ICD-10-CM

## 2018-11-20 DIAGNOSIS — J45.909 EXTRINSIC ASTHMA, UNSPECIFIED ASTHMA SEVERITY, UNSPECIFIED WHETHER COMPLICATED, UNSPECIFIED WHETHER PERSISTENT: ICD-10-CM

## 2018-11-20 PROCEDURE — 43282 LAP PARAESOPH HER RPR W/MESH: CPT | Performed by: SURGERY

## 2018-11-20 PROCEDURE — 88307 TISSUE EXAM BY PATHOLOGIST: CPT | Performed by: PATHOLOGY

## 2018-11-20 PROCEDURE — 43659 UNLISTED LAPS PX STOMACH: CPT | Performed by: SURGERY

## 2018-11-20 PROCEDURE — 0DB64ZZ EXCISION OF STOMACH, PERCUTANEOUS ENDOSCOPIC APPROACH: ICD-10-PCS | Performed by: SURGERY

## 2018-11-20 PROCEDURE — 88342 IMHCHEM/IMCYTCHM 1ST ANTB: CPT | Performed by: PATHOLOGY

## 2018-11-20 PROCEDURE — 0DJ08ZZ INSPECTION OF UPPER INTESTINAL TRACT, VIA NATURAL OR ARTIFICIAL OPENING ENDOSCOPIC: ICD-10-PCS | Performed by: SURGERY

## 2018-11-20 PROCEDURE — 8E0W4CZ ROBOTIC ASSISTED PROCEDURE OF TRUNK REGION, PERCUTANEOUS ENDOSCOPIC APPROACH: ICD-10-PCS | Performed by: SURGERY

## 2018-11-20 PROCEDURE — 0D164ZA BYPASS STOMACH TO JEJUNUM, PERCUTANEOUS ENDOSCOPIC APPROACH: ICD-10-PCS | Performed by: SURGERY

## 2018-11-20 RX ORDER — EPHEDRINE SULFATE 50 MG/ML
INJECTION, SOLUTION INTRAVENOUS AS NEEDED
Status: DISCONTINUED | OUTPATIENT
Start: 2018-11-20 | End: 2018-11-20 | Stop reason: SURG

## 2018-11-20 RX ORDER — HYDROMORPHONE HCL/PF 1 MG/ML
0.5 SYRINGE (ML) INJECTION
Status: DISCONTINUED | OUTPATIENT
Start: 2018-11-20 | End: 2018-11-20 | Stop reason: HOSPADM

## 2018-11-20 RX ORDER — ACETAMINOPHEN 160 MG/5ML
325 SUSPENSION, ORAL (FINAL DOSE FORM) ORAL EVERY 4 HOURS PRN
Status: DISCONTINUED | OUTPATIENT
Start: 2018-11-20 | End: 2018-11-21 | Stop reason: HOSPADM

## 2018-11-20 RX ORDER — BUPIVACAINE HYDROCHLORIDE AND EPINEPHRINE 5; 5 MG/ML; UG/ML
INJECTION, SOLUTION PERINEURAL AS NEEDED
Status: DISCONTINUED | OUTPATIENT
Start: 2018-11-20 | End: 2018-11-20 | Stop reason: HOSPADM

## 2018-11-20 RX ORDER — PROPOFOL 10 MG/ML
INJECTION, EMULSION INTRAVENOUS AS NEEDED
Status: DISCONTINUED | OUTPATIENT
Start: 2018-11-20 | End: 2018-11-20 | Stop reason: SURG

## 2018-11-20 RX ORDER — CEFAZOLIN SODIUM 2 G/50ML
2000 SOLUTION INTRAVENOUS ONCE
Status: COMPLETED | OUTPATIENT
Start: 2018-11-20 | End: 2018-11-20

## 2018-11-20 RX ORDER — ONDANSETRON 2 MG/ML
4 INJECTION INTRAMUSCULAR; INTRAVENOUS ONCE
Status: COMPLETED | OUTPATIENT
Start: 2018-11-20 | End: 2018-11-20

## 2018-11-20 RX ORDER — SODIUM CHLORIDE, SODIUM LACTATE, POTASSIUM CHLORIDE, CALCIUM CHLORIDE 600; 310; 30; 20 MG/100ML; MG/100ML; MG/100ML; MG/100ML
75 INJECTION, SOLUTION INTRAVENOUS CONTINUOUS
Status: DISCONTINUED | OUTPATIENT
Start: 2018-11-20 | End: 2018-11-21 | Stop reason: HOSPADM

## 2018-11-20 RX ORDER — HYDROMORPHONE HYDROCHLORIDE 2 MG/ML
INJECTION, SOLUTION INTRAMUSCULAR; INTRAVENOUS; SUBCUTANEOUS AS NEEDED
Status: DISCONTINUED | OUTPATIENT
Start: 2018-11-20 | End: 2018-11-20 | Stop reason: SURG

## 2018-11-20 RX ORDER — SODIUM CHLORIDE 9 MG/ML
125 INJECTION, SOLUTION INTRAVENOUS CONTINUOUS
Status: DISCONTINUED | OUTPATIENT
Start: 2018-11-20 | End: 2018-11-20

## 2018-11-20 RX ORDER — OXYCODONE HCL 5 MG/5 ML
5 SOLUTION, ORAL ORAL EVERY 4 HOURS PRN
Status: DISCONTINUED | OUTPATIENT
Start: 2018-11-20 | End: 2018-11-21 | Stop reason: HOSPADM

## 2018-11-20 RX ORDER — PROMETHAZINE HYDROCHLORIDE 25 MG/ML
25 INJECTION, SOLUTION INTRAMUSCULAR; INTRAVENOUS EVERY 6 HOURS PRN
Status: DISCONTINUED | OUTPATIENT
Start: 2018-11-20 | End: 2018-11-21 | Stop reason: HOSPADM

## 2018-11-20 RX ORDER — SCOLOPAMINE TRANSDERMAL SYSTEM 1 MG/1
1 PATCH, EXTENDED RELEASE TRANSDERMAL ONCE AS NEEDED
Status: DISCONTINUED | OUTPATIENT
Start: 2018-11-20 | End: 2018-11-20

## 2018-11-20 RX ORDER — CEFAZOLIN SODIUM 2 G/50ML
2000 SOLUTION INTRAVENOUS EVERY 8 HOURS
Status: COMPLETED | OUTPATIENT
Start: 2018-11-20 | End: 2018-11-21

## 2018-11-20 RX ORDER — PANTOPRAZOLE SODIUM 40 MG/1
40 INJECTION, POWDER, FOR SOLUTION INTRAVENOUS
Status: DISCONTINUED | OUTPATIENT
Start: 2018-11-21 | End: 2018-11-21 | Stop reason: HOSPADM

## 2018-11-20 RX ORDER — MIDAZOLAM HYDROCHLORIDE 1 MG/ML
INJECTION INTRAMUSCULAR; INTRAVENOUS AS NEEDED
Status: DISCONTINUED | OUTPATIENT
Start: 2018-11-20 | End: 2018-11-20 | Stop reason: SURG

## 2018-11-20 RX ORDER — FENTANYL CITRATE 50 UG/ML
INJECTION, SOLUTION INTRAMUSCULAR; INTRAVENOUS AS NEEDED
Status: DISCONTINUED | OUTPATIENT
Start: 2018-11-20 | End: 2018-11-20 | Stop reason: SURG

## 2018-11-20 RX ORDER — METOCLOPRAMIDE HYDROCHLORIDE 5 MG/ML
10 INJECTION INTRAMUSCULAR; INTRAVENOUS EVERY 6 HOURS PRN
Status: DISCONTINUED | OUTPATIENT
Start: 2018-11-20 | End: 2018-11-21 | Stop reason: HOSPADM

## 2018-11-20 RX ORDER — MAGNESIUM HYDROXIDE 1200 MG/15ML
LIQUID ORAL AS NEEDED
Status: DISCONTINUED | OUTPATIENT
Start: 2018-11-20 | End: 2018-11-20 | Stop reason: HOSPADM

## 2018-11-20 RX ORDER — ONDANSETRON 2 MG/ML
4 INJECTION INTRAMUSCULAR; INTRAVENOUS EVERY 6 HOURS PRN
Status: DISCONTINUED | OUTPATIENT
Start: 2018-11-20 | End: 2018-11-21 | Stop reason: HOSPADM

## 2018-11-20 RX ORDER — CEFAZOLIN SODIUM 2 G/50ML
2000 SOLUTION INTRAVENOUS EVERY 8 HOURS
Status: DISCONTINUED | OUTPATIENT
Start: 2018-11-20 | End: 2018-11-20

## 2018-11-20 RX ORDER — HEPARIN SODIUM 5000 [USP'U]/ML
5000 INJECTION, SOLUTION INTRAVENOUS; SUBCUTANEOUS
Status: COMPLETED | OUTPATIENT
Start: 2018-11-20 | End: 2018-11-20

## 2018-11-20 RX ORDER — ROCURONIUM BROMIDE 10 MG/ML
INJECTION, SOLUTION INTRAVENOUS AS NEEDED
Status: DISCONTINUED | OUTPATIENT
Start: 2018-11-20 | End: 2018-11-20 | Stop reason: SURG

## 2018-11-20 RX ORDER — LIDOCAINE HYDROCHLORIDE 10 MG/ML
INJECTION, SOLUTION INFILTRATION; PERINEURAL AS NEEDED
Status: DISCONTINUED | OUTPATIENT
Start: 2018-11-20 | End: 2018-11-20 | Stop reason: SURG

## 2018-11-20 RX ORDER — OXYCODONE HCL 5 MG/5 ML
10 SOLUTION, ORAL ORAL EVERY 4 HOURS PRN
Status: DISCONTINUED | OUTPATIENT
Start: 2018-11-20 | End: 2018-11-21 | Stop reason: HOSPADM

## 2018-11-20 RX ORDER — FENTANYL CITRATE/PF 50 MCG/ML
25 SYRINGE (ML) INJECTION
Status: DISCONTINUED | OUTPATIENT
Start: 2018-11-20 | End: 2018-11-20 | Stop reason: HOSPADM

## 2018-11-20 RX ORDER — MORPHINE SULFATE 4 MG/ML
4 INJECTION, SOLUTION INTRAMUSCULAR; INTRAVENOUS EVERY 2 HOUR PRN
Status: DISCONTINUED | OUTPATIENT
Start: 2018-11-20 | End: 2018-11-21 | Stop reason: HOSPADM

## 2018-11-20 RX ORDER — GLYCOPYRROLATE 0.2 MG/ML
INJECTION INTRAMUSCULAR; INTRAVENOUS AS NEEDED
Status: DISCONTINUED | OUTPATIENT
Start: 2018-11-20 | End: 2018-11-20 | Stop reason: SURG

## 2018-11-20 RX ORDER — HYDRALAZINE HYDROCHLORIDE 20 MG/ML
10 INJECTION INTRAMUSCULAR; INTRAVENOUS EVERY 6 HOURS PRN
Status: DISCONTINUED | OUTPATIENT
Start: 2018-11-20 | End: 2018-11-21 | Stop reason: HOSPADM

## 2018-11-20 RX ORDER — ONDANSETRON 2 MG/ML
4 INJECTION INTRAMUSCULAR; INTRAVENOUS ONCE AS NEEDED
Status: DISCONTINUED | OUTPATIENT
Start: 2018-11-20 | End: 2018-11-20 | Stop reason: HOSPADM

## 2018-11-20 RX ADMIN — ROCURONIUM BROMIDE 10 MG: 10 INJECTION INTRAVENOUS at 14:50

## 2018-11-20 RX ADMIN — SCOPALAMINE 1 PATCH: 1 PATCH, EXTENDED RELEASE TRANSDERMAL at 11:28

## 2018-11-20 RX ADMIN — TRIMETHOBENZAMIDE HYDROCHLORIDE 200 MG: 100 INJECTION INTRAMUSCULAR at 17:04

## 2018-11-20 RX ADMIN — MORPHINE SULFATE 4 MG: 4 INJECTION INTRAVENOUS at 22:42

## 2018-11-20 RX ADMIN — CEFAZOLIN SODIUM 2000 MG: 2 SOLUTION INTRAVENOUS at 12:37

## 2018-11-20 RX ADMIN — ROCURONIUM BROMIDE 50 MG: 10 INJECTION INTRAVENOUS at 12:50

## 2018-11-20 RX ADMIN — GLYCOPYRROLATE 0.4 MG: 0.2 INJECTION, SOLUTION INTRAMUSCULAR; INTRAVENOUS at 16:03

## 2018-11-20 RX ADMIN — FENTANYL CITRATE 25 MCG: 50 INJECTION INTRAMUSCULAR; INTRAVENOUS at 16:53

## 2018-11-20 RX ADMIN — HYDROMORPHONE HYDROCHLORIDE 0.5 MG: 2 INJECTION, SOLUTION INTRAMUSCULAR; INTRAVENOUS; SUBCUTANEOUS at 16:24

## 2018-11-20 RX ADMIN — ROCURONIUM BROMIDE 50 MG: 10 INJECTION INTRAVENOUS at 13:33

## 2018-11-20 RX ADMIN — MIDAZOLAM 2 MG: 1 INJECTION INTRAMUSCULAR; INTRAVENOUS at 12:37

## 2018-11-20 RX ADMIN — LIDOCAINE HYDROCHLORIDE 80 MG: 10 INJECTION, SOLUTION INFILTRATION; PERINEURAL at 12:50

## 2018-11-20 RX ADMIN — METRONIDAZOLE 500 MG: 500 INJECTION, SOLUTION INTRAVENOUS at 12:50

## 2018-11-20 RX ADMIN — ONDANSETRON 4 MG: 2 INJECTION INTRAMUSCULAR; INTRAVENOUS at 22:42

## 2018-11-20 RX ADMIN — FENTANYL CITRATE 50 MCG: 50 INJECTION, SOLUTION INTRAMUSCULAR; INTRAVENOUS at 13:31

## 2018-11-20 RX ADMIN — FENTANYL CITRATE 50 MCG: 50 INJECTION, SOLUTION INTRAMUSCULAR; INTRAVENOUS at 15:50

## 2018-11-20 RX ADMIN — NEOSTIGMINE METHYLSULFATE 3 MG: 1 INJECTION, SOLUTION INTRAMUSCULAR; INTRAVENOUS; SUBCUTANEOUS at 16:03

## 2018-11-20 RX ADMIN — METRONIDAZOLE 500 MG: 500 INJECTION, SOLUTION INTRAVENOUS at 21:29

## 2018-11-20 RX ADMIN — SODIUM CHLORIDE: 0.9 INJECTION, SOLUTION INTRAVENOUS at 15:15

## 2018-11-20 RX ADMIN — HEPARIN SODIUM 5000 UNITS: 5000 INJECTION INTRAVENOUS; SUBCUTANEOUS at 11:37

## 2018-11-20 RX ADMIN — HYDROMORPHONE HYDROCHLORIDE 0.5 MG: 2 INJECTION, SOLUTION INTRAMUSCULAR; INTRAVENOUS; SUBCUTANEOUS at 16:16

## 2018-11-20 RX ADMIN — DEXAMETHASONE SODIUM PHOSPHATE 8 MG: 10 INJECTION INTRAMUSCULAR; INTRAVENOUS at 12:53

## 2018-11-20 RX ADMIN — HYDROMORPHONE HYDROCHLORIDE 0.5 MG: 2 INJECTION, SOLUTION INTRAMUSCULAR; INTRAVENOUS; SUBCUTANEOUS at 15:23

## 2018-11-20 RX ADMIN — PROPOFOL 200 MG: 10 INJECTION, EMULSION INTRAVENOUS at 12:50

## 2018-11-20 RX ADMIN — CEFAZOLIN SODIUM 2000 MG: 2 SOLUTION INTRAVENOUS at 20:51

## 2018-11-20 RX ADMIN — ROCURONIUM BROMIDE 5 MG: 10 INJECTION INTRAVENOUS at 15:45

## 2018-11-20 RX ADMIN — PROMETHAZINE HYDROCHLORIDE 25 MG: 25 INJECTION INTRAMUSCULAR; INTRAVENOUS at 18:14

## 2018-11-20 RX ADMIN — SODIUM CHLORIDE 125 ML/HR: 0.9 INJECTION, SOLUTION INTRAVENOUS at 11:26

## 2018-11-20 RX ADMIN — FENTANYL CITRATE 25 MCG: 50 INJECTION INTRAMUSCULAR; INTRAVENOUS at 16:44

## 2018-11-20 RX ADMIN — HYDROMORPHONE HYDROCHLORIDE 0.5 MG: 2 INJECTION, SOLUTION INTRAMUSCULAR; INTRAVENOUS; SUBCUTANEOUS at 14:19

## 2018-11-20 RX ADMIN — SODIUM CHLORIDE, SODIUM LACTATE, POTASSIUM CHLORIDE, AND CALCIUM CHLORIDE 125 ML/HR: .6; .31; .03; .02 INJECTION, SOLUTION INTRAVENOUS at 17:11

## 2018-11-20 RX ADMIN — SODIUM CHLORIDE: 0.9 INJECTION, SOLUTION INTRAVENOUS at 14:26

## 2018-11-20 RX ADMIN — SODIUM CHLORIDE: 0.9 INJECTION, SOLUTION INTRAVENOUS at 13:40

## 2018-11-20 RX ADMIN — ONDANSETRON 4 MG: 2 INJECTION INTRAMUSCULAR; INTRAVENOUS at 16:43

## 2018-11-20 RX ADMIN — EPHEDRINE SULFATE 10 MG: 50 INJECTION, SOLUTION INTRAMUSCULAR; INTRAVENOUS; SUBCUTANEOUS at 13:17

## 2018-11-20 RX ADMIN — FENTANYL CITRATE 25 MCG: 50 INJECTION INTRAMUSCULAR; INTRAVENOUS at 17:05

## 2018-11-20 RX ADMIN — FENTANYL CITRATE 100 MCG: 50 INJECTION, SOLUTION INTRAMUSCULAR; INTRAVENOUS at 12:50

## 2018-11-20 NOTE — ANESTHESIA POSTPROCEDURE EVALUATION
Post-Op Assessment Note      CV Status:  Stable    Mental Status:  Alert and awake    Hydration Status:  Euvolemic    PONV Controlled:  Controlled    Airway Patency:  Patent    Post Op Vitals Reviewed: Yes          Staff: Anesthesiologist           /80 (11/20/18 1709)    Temp 98 3 °F (36 8 °C) (11/20/18 1709)    Pulse 76 (11/20/18 1709)   Resp 13 (11/20/18 1709)    SpO2 98 % (11/20/18 1709)

## 2018-11-20 NOTE — H&P (VIEW-ONLY)
BARIATRIC HISTORY AND PHYSICAL - BARIATRIC SURGERY  Niles Bennett 48 y o  female MRN: [id number]  Unit/Bed#:  Encounter: [id number]      HPI:  Niles Bennett is a 48 y o  female who presents to review her preoperative workup and see if she is a good candidate to undergo a bariatric procedure  Review of Systems   Constitutional: Negative  HENT: Negative  Eyes: Negative  Respiratory: Negative  Cardiovascular: Negative  Gastrointestinal: Negative  Endocrine: Negative  Genitourinary: Negative  Musculoskeletal: Negative  Skin: Negative  Neurological: Negative  Hematological: Negative  Psychiatric/Behavioral: Negative  Historical Information   Past Medical History:   Diagnosis Date    Anemia     Anxiety     Arthritis     Asthma     Bariatric surgery status     Depression     Disease of thyroid gland     hypothyroid    Edema     GERD (gastroesophageal reflux disease)     History of anemia     History of edema     Hypertension     Insomnia     Morbid obesity (HCC)     Postgastrectomy malabsorption     Spinal stenosis      Past Surgical History:   Procedure Laterality Date    ANKLE SURGERY Bilateral     tendon repair    CARPAL TUNNEL RELEASE Bilateral     CHOLECYSTECTOMY      COLONOSCOPY      GASTRIC BYPASS      NEUROMA EXCISION      single Dewitt's neuroma    ID EGD TRANSORAL BIOPSY SINGLE/MULTIPLE N/A 8/22/2018    Procedure: ESOPHAGOGASTRODUODENOSCOPY (EGD); Surgeon: Taylor Rahman MD;  Location: [facility];   Service: Bariatrics    ROTATOR CUFF REPAIR      left    SHOULDER SURGERY      TONSILLECTOMY      TOTAL HIP ARTHROPLASTY Left     TOTAL KNEE ARTHROPLASTY Right      Social History   History   Alcohol Use    Yes     Comment: social      History   Drug Use No     History   Smoking Status    Never Smoker   Smokeless Tobacco    Never Used     Family History: non-contributory    Meds/Allergies   all medications and allergies reviewed  Allergies Allergen Reactions    Iodine      IV dye    Medical Tape     Meperidine     Sulfate        Objective     Current Vitals:   Blood Pressure: 116/80 (11/01/18 1313)  Pulse: 94 (11/01/18 1313)  Temperature: 98 4 °F (36 9 °C) (11/01/18 1313)  Temp Source: Tympanic (11/01/18 1313)  Height: 5' 7 5" (171 5 cm) (11/01/18 1313)  Weight - Scale: 123 kg (271 lb) (11/01/18 1313)  Body mass index is 41 82 kg/m²  [unfilled]    Invasive Devices          No matching active lines, drains, or airways          Physical Exam   Constitutional: She is oriented to person, place, and time  She appears well-developed  HENT:   Head: Normocephalic and atraumatic  Eyes: Conjunctivae and EOM are normal    Neck: Normal range of motion  Neck supple  Cardiovascular: Normal rate, regular rhythm, normal heart sounds and intact distal pulses  Pulmonary/Chest: Effort normal and breath sounds normal    Abdominal: Soft  Bowel sounds are normal    Musculoskeletal: Normal range of motion  Neurological: She is alert and oriented to person, place, and time  She has normal reflexes  Skin: Skin is warm and dry  Psychiatric: She has a normal mood and affect  Lab Results: I have personally reviewed pertinent lab results  Imaging: I have personally reviewed pertinent reports  EKG, Pathology, and Other Studies: I have personally reviewed pertinent reports  Code Status: [unfilled]  Advance Directive and Living Will:      Power of :    POLST:      Assessment/Plan:      The patient presented to review the preoperative workup and see if bariatric surgery is appropriate and indicated following the extensive preoperative workup and the enrollment in our weight loss program    Preoperative workup was complete  Results were reviewed with the patient including the blood work results and the endoscopy findings and the biopsy results  We also reviewed the cardiology evaluation       I believe that the patient will be a good candidate for laparoscopic MARBELLA-EN-Y GASTRIC BYPASS REVISION PATIENT HAS A HISTORY OF MARBELLA-EN-Y GASTRIC BYPASS THAT [place] PERFOR[place] AND WAS FOUND TO HAVE A LARGE NON EXCLUDED FUNDUS    Patient will need to start the 2 week liquid diet prior to surgery  Risks and benefits explained one more time to the patient  Alternatives to surgery and alternative forms of surgery were also explained  Post-surgical commitment and after care programs were explained  We also discussed that the [employer] robot may be available to us to use on the day of the patient procedure  and that the procedure may be performed robotically  I discussed in details the advantages and disadvantages of this approach including the potential decrease in postoperative pain because of the remote center technology  I also mentioned the lack of strong evidence for  the use of robot in bariatric patients and the potential disadvantage to patients because of the prolonged operative time  Consent was signed  Questions were answered and concerns were addressed  Patient wishes to proceed  As per  [address] [place] [address] guidelines, I had a discussion with the patient regarding his CODE STATUS in the perioperative period and the patient is level 1 or FULL CODE STATUS

## 2018-11-20 NOTE — OP NOTE
OPERATIVE REPORT  PATIENT NAME: Leeann Shaikh    :  1965  MRN: [id number]  Pt Location: [facility] OR ROOM 06    SURGERY DATE: 2018    Surgeon(s) and Role:     * Kamar Lloyd MD - Primary     * Denise Duong MD - Assisting     * Miles Bassett PA-C - Assisting    Preop Diagnosis:  Gastroesophageal reflux disease, esophagitis presence not specified [K21 9]  Paraesophageal hernia [K44 9]  Anemia, unspecified type [D64 9]  Extrinsic asthma, unspecified asthma severity, unspecified whether complicated, unspecified whether persistent [J45 909]  Bariatric surgery status [Z98 84]    Post-Op Diagnosis Codes:     * Gastroesophageal reflux disease, esophagitis presence not specified [K21 9]     * Paraesophageal hernia [K44 9]     * Anemia, unspecified type [D64 9]     * Extrinsic asthma, unspecified asthma severity, unspecified whether complicated, unspecified whether persistent [J45 909]     * Bariatric surgery status [Z98 84]    Procedure(s) (LRB):  LAPAROSCOPIC REVISION OF MARBELLA-EN-Y BYPASS W/ROBOTICS & INTRAOP EGD (N/A)    Specimen(s):  * No specimens in log *    Estimated Blood Loss:   40 ml    Drains:  Closed/Suction Drain RLQ Bulb 19 Fr  (Active)   Number of days: 0       Anesthesia Type:   General    Operative Indications:  Gastroesophageal reflux disease, esophagitis presence not specified [K21 9]  Paraesophageal hernia [K44 9]  Anemia, unspecified type [D64 9]  Extrinsic asthma, unspecified asthma severity, unspecified whether complicated, unspecified whether persistent [J45 909]  Bariatric surgery status [Z98 84]      Operative Findings:  Large non excluded fundus  Dilated gastrojejunostomy anastomosis    Complications:   None    Procedure and Technique:  Laparoscopic partial gastrectomy robotic assisted  Laparoscopic gastrojejunostomy with intraop endoscopy  robotic assisted   A qualified resident physician was not available    Patient Disposition:  hemodynamically stable     Description of the procedure:   The patient was placed in the supine position  The patient received a dose of IV antibiotics and a dose of subcutaneous heparin prior to the procedure   The patient was induced and intubated under general endotracheal anesthesia  The abdominal wall was prepped and draped under sterile conditions in the usual fashion       After calling a time-out, we started the procedure by making an incision to the left of the midline 6 inches from the xiphoid, and the abdominal cavity was accessed using an Optiview trocar  The abdominal cavity was first insufflated with CO2 to a pressure of 15 mmHg using a Veress needle   The abdominal cavity was inspected and there was no evidence of tumors or lesions  There was no evidence of injury to the bowel or bleeding from the insertion of the Optiview trocar   At that point, two 8-mm and 12-mm trocars were placed under direct visualization on the right side of the abdominal wall and two other 8 mm and  12-mm trocars were placed on the left side of the abdominal wall, all under direct visualization and the robot was then docked and locked in place      Patient was placed in reverse Trendelenburg position,  a Ro liver retractor was placed through a stab incision in the subxiphoid space, and the left lobe of the liver was retracted medially  There were dense intra-abdominal adhesions between the left lobe of the liver and the gastric pouch these were taken down with the Harmonic scalpel/ An OG tube was placed in the stomach to decompress the stomach pouch and was then removed   The angle of His was dissected with a combination of sharp dissection and blunt dissection  At that point I turned my attention to the gastric pouch the gastric pouch was  from the gastric remnant using a vessel sealer, there was small dog ear on the gastric remnant side and that was transected and removed from the abdominal cavity using a stapler with a blue load   Of note the gastric pouch was very large and the fundus was not excluded  Fundus was mobilized by taking the short gastrics down using the vessel sealer  I then turned my attention to the gastrojejunostomy anastomosis which was dissected from the surrounding tissue using the vessel sealer there was dense inflammatory reaction  The small bowel was transected with a stapler with a white load and then the gastric pouch was transected using green load distal to the left gastric bundle and the fundus was also excluded with another firing of the stapler again using a green load  The staple line of the gastric pouch was imbricated using running 2-0 V lock suture  A new gastrojejunostomy anastomosis was then fashioned in an antecolic antegastric fashion in 2 layers  Outer layer was a running layer of 2-0 V lock and the inner  layer was a running 2-0 Vicryl suture  Upper endoscopy was performed showed evidence of bubbles on the right corner this was easily controlled with a simple 2-0 Vicryl suture  Repeat upper endoscopy showed no evidence of bubbles to suggest leak  23 [employer] HEATHER drain was left in place behind the anastomosis  The gastrojejunostomy was covered with an omental flap that was secured in place with an absorbable suture in a simple fashion  The Ro liver retractor was removed  Surgical field was sprayed with fibrin glue for hemostasis  Specimen including stomach and small bowel was removed using an Endo-Catch bag and the 12 mm port on the right side was closed with #1 Vicryl suture  All the skin edges of the trocar sites were approximated with 4-0 Monocryl in a subcuticular inverted fashion  The patient was extubated and transferred to the PACU in stable condition      SIGNATURE: Fredis Banegas MD  DATE: November 20, 2018  TIME: 4:01 PM

## 2018-11-21 ENCOUNTER — APPOINTMENT (INPATIENT)
Dept: RADIOLOGY | Facility: HOSPITAL | Age: 53
DRG: 327 | End: 2018-11-21
Payer: COMMERCIAL

## 2018-11-21 VITALS
RESPIRATION RATE: 18 BRPM | SYSTOLIC BLOOD PRESSURE: 156 MMHG | WEIGHT: 263.06 LBS | DIASTOLIC BLOOD PRESSURE: 74 MMHG | TEMPERATURE: 98.7 F | OXYGEN SATURATION: 100 % | HEART RATE: 68 BPM | HEIGHT: 68 IN | BODY MASS INDEX: 39.87 KG/M2

## 2018-11-21 LAB
ANION GAP SERPL CALCULATED.3IONS-SCNC: 13 MMOL/L (ref 4–13)
BUN SERPL-MCNC: 14 MG/DL (ref 5–25)
CALCIUM SERPL-MCNC: 9.2 MG/DL (ref 8.3–10.1)
CHLORIDE SERPL-SCNC: 102 MMOL/L (ref 100–108)
CO2 SERPL-SCNC: 21 MMOL/L (ref 21–32)
CREAT SERPL-MCNC: 0.86 MG/DL (ref 0.6–1.3)
ERYTHROCYTE [DISTWIDTH] IN BLOOD BY AUTOMATED COUNT: 12.5 % (ref 11.6–15.1)
GFR SERPL CREATININE-BSD FRML MDRD: 77 ML/MIN/1.73SQ M
GLUCOSE SERPL-MCNC: 129 MG/DL (ref 65–140)
HCT VFR BLD AUTO: 40.6 % (ref 34.8–46.1)
HGB BLD-MCNC: 12.9 G/DL (ref 11.5–15.4)
MCH RBC QN AUTO: 32.3 PG (ref 26.8–34.3)
MCHC RBC AUTO-ENTMCNC: 31.8 G/DL (ref 31.4–37.4)
MCV RBC AUTO: 102 FL (ref 82–98)
PLATELET # BLD AUTO: 249 THOUSANDS/UL (ref 149–390)
PMV BLD AUTO: 9.3 FL (ref 8.9–12.7)
POTASSIUM SERPL-SCNC: 4.8 MMOL/L (ref 3.5–5.3)
RBC # BLD AUTO: 4 MILLION/UL (ref 3.81–5.12)
SODIUM SERPL-SCNC: 136 MMOL/L (ref 136–145)
WBC # BLD AUTO: 9.96 THOUSAND/UL (ref 4.31–10.16)

## 2018-11-21 PROCEDURE — C9113 INJ PANTOPRAZOLE SODIUM, VIA: HCPCS | Performed by: SURGERY

## 2018-11-21 PROCEDURE — 74240 X-RAY XM UPR GI TRC 1CNTRST: CPT

## 2018-11-21 PROCEDURE — 99255 IP/OBS CONSLTJ NEW/EST HI 80: CPT | Performed by: PHYSICIAN ASSISTANT

## 2018-11-21 PROCEDURE — 80048 BASIC METABOLIC PNL TOTAL CA: CPT | Performed by: SURGERY

## 2018-11-21 PROCEDURE — 85027 COMPLETE CBC AUTOMATED: CPT | Performed by: SURGERY

## 2018-11-21 RX ADMIN — PANTOPRAZOLE SODIUM 40 MG: 40 INJECTION, POWDER, FOR SOLUTION INTRAVENOUS at 08:08

## 2018-11-21 RX ADMIN — ONDANSETRON 4 MG: 2 INJECTION INTRAMUSCULAR; INTRAVENOUS at 04:43

## 2018-11-21 RX ADMIN — SODIUM CHLORIDE, SODIUM LACTATE, POTASSIUM CHLORIDE, AND CALCIUM CHLORIDE 125 ML/HR: .6; .31; .03; .02 INJECTION, SOLUTION INTRAVENOUS at 01:30

## 2018-11-21 RX ADMIN — MORPHINE SULFATE 4 MG: 4 INJECTION INTRAVENOUS at 08:08

## 2018-11-21 RX ADMIN — IOHEXOL 50 ML: 350 INJECTION, SOLUTION INTRAVENOUS at 11:08

## 2018-11-21 RX ADMIN — CEFAZOLIN SODIUM 2000 MG: 2 SOLUTION INTRAVENOUS at 04:29

## 2018-11-21 RX ADMIN — METRONIDAZOLE 500 MG: 500 INJECTION, SOLUTION INTRAVENOUS at 05:10

## 2018-11-21 NOTE — CONSULTS
Consult- Moriah Krishnamurthy 1965, 48 y o  female MRN: [id number]    Unit/Bed#: E5 -01 Encounter: [id number]    Primary Care Provider: FRANCISCO J Becerra   Date and time admitted to hospital: 11/20/2018 10:55 AM      Inpatient consult to Internal Medicine  Consult performed by: Margot Sewell ordered by: Layton Rivera Consultation - [facility]    Patient Information: Moriah Krishnamurthy 48 y o  female MRN: [id number]  Unit/Bed#: E5 -01 Encounter: [id number]  PCP: FRANCISCO J Becerra  Date of Admission:  11/20/2018  Date of Consultation: 11/21/18  Requesting Physician: Tigist Staples MD    Reason For Consultation:   Med mgmt/htn    Assessment/Plan:      Anemia   Assessment & Plan    Hx of  Pt w/o any evidence of postoperative anemia       Gastroesophageal reflux disease   Assessment & Plan    Continue ppi     Bariatric surgery status   Assessment & Plan    W/morbid obesity w/failure of prior rygb s/p robotic assisted lap revision of rygb w/intraop egd  Upper gi study w/o leak  Continue diet, ppi, mgmt per 1* team  No barriers for d/c from [facility]     Benign hypertension   Assessment & Plan    Continue lisinopril 20mg  Pt on spironolactone for cystic acne  Recommend holding until f/u with pcp given postop bariatric diet to minimize risk of SANDEEP/hypotension        Anxiety and depression   Assessment & Plan    Continue lexapro, ativan prn       · Hypothyroid  · Continue levothyroxine    VTE Prophylaxis: Sequential compression device (Venodyne)   / sequential compression device     Recommendations for Discharge:  · No barriers from [facility] safe for d/c from [facility] perspective    Counseling / Coordination of Care Time: 30 minutes  Greater than 50% of total time spent on patient counseling and coordination of care  Collaboration of Care:  Were Recommendations Directly Discussed with Primary Treatment Team? - No     History of Present Illness:    Moriah Krishnamurthy is a 48 y o  female who is originally admitted to the bariatric service on 11/20/2018 due to morbid obesity, bariatric status w/paraesophageal hernia, anemia  We are consulted for htn and presumed med mgmt  Patient is a pleasant 63-year-old female past medical history of Albert-en-Y gastric bypass, post gastric sleeve malabsorption, anxiety depression anemia hypertension, anemia, hypothyroidism,  spinal stenosis coming to the hospital for elective revision of Albert-en-Y gastric bypass  Patient is morbidly obese and has previously undergone Albert-en-Y gastric bypass with minimal improvement in her weight  She is found to be a candidate for revision is now postop day 1  She reports minimal pain primarily located her right upper quadrant around her HEATHER drain  Previously she did have some degree of nausea and dry heaving but no vomiting which since resolved  No chest pain or shortness of breath no fevers  She is passing gas  Patient does have a history of hypertension lisinopril and cystic acne and spironolactone  She has never been diagnosed with diabetes CAD, CVA, PAD, CHF or cirrhosis  No hx of pedal or pulmonary edema  Doing well on lexapro  Rarely takes soma or oxycodone for low back pain  Review of Systems:    Review of Systems   Constitutional: Positive for appetite change  Negative for chills and fever  Respiratory: Negative for shortness of breath  Cardiovascular: Negative for chest pain  Gastrointestinal: Positive for abdominal pain and nausea  All other systems reviewed and are negative        Past Medical and Surgical History:     Past Medical History:   Diagnosis Date    Anemia     Anxiety     Arthritis     Asthma     Bariatric surgery status     Depression     Disease of thyroid gland     hypothyroid    Edema     GERD (gastroesophageal reflux disease)     History of anemia     History of edema     Hypertension     Insomnia     Morbid obesity (HCC)     Postgastrectomy malabsorption     Spinal stenosis        Past Surgical History:   Procedure Laterality Date    ANKLE SURGERY Bilateral     tendon repair    CARPAL TUNNEL RELEASE Bilateral     CHOLECYSTECTOMY      COLONOSCOPY      GASTRIC BYPASS      GASTRIC BYPASS LAPAROSCOPIC N/A 11/20/2018    Procedure: LAPAROSCOPIC REVISION OF MARBELLA-EN-Y BYPASS W/ROBOTICS & INTRAOP EGD;  Surgeon: Taylor Rahman MD;  Location: [facility];  Service: Bariatrics    NEUROMA EXCISION      single Dewitt's neuroma    [place] EGD TRANSORAL BIOPSY SINGLE/MULTIPLE N/A 8/22/2018    Procedure: ESOPHAGOGASTRODUODENOSCOPY (EGD); Surgeon: Taylor Rahman MD;  Location: [facility]; Service: Bariatrics    ROTATOR CUFF REPAIR      left    SHOULDER SURGERY      TONSILLECTOMY      TOTAL HIP ARTHROPLASTY Left     TOTAL KNEE ARTHROPLASTY Right        Meds/Allergies:    all medications and allergies reviewed    Allergies: Allergies   Allergen Reactions    Iodine      IV dye  Uncontrolled sneezing      Medical Tape     Meperidine     Sulfate        Social History:     Marital Status: /Civil Union    Substance Use History:   History   Alcohol Use    Yes     Comment: social      History   Smoking Status    Never Smoker   Smokeless Tobacco    Never Used     History   Drug Use No       Family History:    Family History   Problem Relation Age of Onset    Hypertension Mother     Other Mother         cardiac disorder    Diabetes type II Mother         without complication, with long term use of insulin    Depression Father        Physical Exam:     Vitals:   Blood Pressure: 156/74 (11/21/18 0808)  Pulse: 68 (11/21/18 0808)  Temperature: 98 7 °F (37 1 °C) (11/21/18 0808)  Temp Source: Temporal (11/21/18 0808)  Respirations: 18 (11/21/18 0808)  Height: 5' 8" (172 7 cm) (11/20/18 1117)  Weight - Scale: 119 kg (263 lb 1 oz) (11/20/18 1117)  SpO2: 100 % (11/21/18 0808)    Physical Exam   HENT:   Head: Normocephalic and atraumatic     Right Ear: External ear normal    Left Ear: External ear normal    Eyes: Conjunctivae are normal    Neck: Normal range of motion  Cardiovascular: Normal rate, regular rhythm and normal heart sounds  Exam reveals no gallop and no friction rub  No murmur heard  Pulmonary/Chest: Effort normal  She has no wheezes  She has no rales  Abdominal: Soft  She exhibits no distension  There is tenderness (epigastric and RUQ ttp w/mild palpation  RUQ HEATHER drain w/30cc sanguineous drainage)  Musculoskeletal: She exhibits no edema  Neurological: She is alert  Skin: Skin is warm and dry  She is not diaphoretic  Psychiatric: She has a normal mood and affect  Vitals reviewed  (  Be Sure to Include Physical Exam: Delete this entire line when you have entered your exam)    Additional Data:     Lab Results: I have personally reviewed pertinent reports  Results from last 7 days  Lab Units 11/21/18  0603   WBC Thousand/uL 9 96   HEMOGLOBIN g/dL 12 9   HEMATOCRIT % 40 6   PLATELETS Thousands/uL 249       Results from last 7 days  Lab Units 11/21/18  0603   POTASSIUM mmol/L 4 8   CHLORIDE mmol/L 102   CO2 mmol/L 21   BUN mg/dL 14   CREATININE mg/dL 0 86   CALCIUM mg/dL 9 2           Imaging: I have personally reviewed pertinent reports  Fl Upper Gi Ugi    Result Date: 11/21/2018  Narrative: LIMITED UPPER GI SERIES INDICATION:  Status post gastric bypass revision  COMPARISON:  Upper GI 6/22/2018 IMAGES:  71 FLUOROSCOPY TIME:  3 8 minutes FINDINGS: 3 AP fluoroscopic  views were obtained  Surgical drain is seen  A limited single contrast upper GI study was performed with approximately 50 mL Omnipaque 350 contrast  Small gastric pouch noted  No evidence of leak detected on initial imaging  Therefore, the study was repeated with a small amount of barium administered orally to the patient  Again no evidence of leak detected   Contrast passes freely from the stomach through the gastrojejunostomy site without evidence of obstruction  Some postoperative edema may be present in this region  The visualized distal esophagus is unremarkable apart from perhaps mild tertiary contractions  Impression: No evidence of leak  Workstation performed: [id number]       EKG, Pathology, and Other Studies Reviewed on Admission:   · EKG:     ** Please Note: This note has been constructed using a voice recognition system   **

## 2018-11-21 NOTE — DISCHARGE INSTRUCTIONS

## 2018-11-21 NOTE — ASSESSMENT & PLAN NOTE
Continue lisinopril 20mg  Pt on spironolactone for cystic acne    Recommend holding until f/u with pcp given postop bariatric diet to minimize risk of SANDEEP/hypotension

## 2018-11-21 NOTE — NURSING NOTE
Reviewed discharge instructions with patient including medications and follow-up appointments  Drain care explained and instructions provided  IV was removed, drain was left intact with a clean and dry dressing and patient was walked out with  and an RN

## 2018-11-21 NOTE — PLAN OF CARE
DISCHARGE PLANNING     Discharge to home or other facility with appropriate resources Progressing        INFECTION - ADULT     Absence or prevention of progression during hospitalization Progressing     Absence of fever/infection during neutropenic period Progressing        Knowledge Deficit     Patient/family/caregiver demonstrates understanding of disease process, treatment plan, medications, and discharge instructions Progressing        PAIN - ADULT     Verbalizes/displays adequate comfort level or baseline comfort level Progressing        SAFETY ADULT     Patient will remain free of falls Progressing     Maintain or return to baseline ADL function Progressing     Maintain or return mobility status to optimal level Progressing          DISCHARGE PLANNING     Discharge to home or other facility with appropriate resources Progressing        INFECTION - ADULT     Absence or prevention of progression during hospitalization Progressing     Absence of fever/infection during neutropenic period Progressing        Knowledge Deficit     Patient/family/caregiver demonstrates understanding of disease process, treatment plan, medications, and discharge instructions Progressing        PAIN - ADULT     Verbalizes/displays adequate comfort level or baseline comfort level Progressing        SAFETY ADULT     Patient will remain free of falls Progressing     Maintain or return to baseline ADL function Progressing     Maintain or return mobility status to optimal level Progressing

## 2018-11-21 NOTE — PROGRESS NOTES
Progress Note - General Surgery   Danni Grant 48 y o  female MRN: [id number]  Unit/Bed#: E5 -01 Encounter: [id number]      Subjective/Objective     Subjective: Patient with morbid obesity s/p laparoscopic robotic assisted revision gastric bypass  No nausea or vomiting, pain controlled on oral pain medication, ambulating without assistance, voiding well, using incentive spirometer  Passing flatus  Objective:    /70 (BP Location: Right arm)   Pulse 74   Temp 98 1 °F (36 7 °C) (Temporal)   Resp 18   Ht 5' 8" (1 727 m)   Wt 119 kg (263 lb 1 oz)   SpO2 100%   BMI 40 00 kg/m²       Intake/Output Summary (Last 24 hours) at 11/21/18 0748  Last data filed at 11/21/18 4378   Gross per 24 hour   Intake             4200 ml   Output               90 ml   Net             4110 ml       Invasive Devices     Peripheral Intravenous Line            Peripheral IV 11/20/18 Left Forearm less than 1 day          Drain            Closed/Suction Drain RLQ Bulb 19 Fr  1 day                  Physical Exam    General Appearance:    Alert, cooperative, no distress, appears stated age   Head:    Normocephalic, without obvious abnormality, atraumatic   Lungs:     Clear to auscultation bilaterally, respirations unlabored   Heart:    Regular rate and rhythm, S1 and S2 normal, no murmur, rub    or gallop   Abdomen:     Soft, appropriate tenderness, bowel sounds active all four quadrants, no masses, no organomegaly, non distended, incisions clean, dry, and intact, drain serosanguinous   Extremities:   Extremities normal, atraumatic, no cyanosis or edema   Neurologic:   CNII-XII intact   Normal strength and sensation               Lab, Imaging and other studies:  CBC:   Lab Results   Component Value Date    WBC 9 96 11/21/2018    HGB 12 9 11/21/2018    HCT 40 6 11/21/2018     (H) 11/21/2018     11/21/2018    MCH 32 3 11/21/2018    MCHC 31 8 11/21/2018    RDW 12 5 11/21/2018    MPV 9 3 11/21/2018   , CMP:   Lab Results   Component Value Date    SODIUM 136 11/21/2018    K 4 8 11/21/2018     11/21/2018    CO2 21 11/21/2018    BUN 14 11/21/2018    CREATININE 0 86 11/21/2018    CALCIUM 9 2 11/21/2018    EGFR 77 11/21/2018        VTE Mechanical Prophylaxis: sequential compression device    Assessment/Plan  #1  Morbid Obesity s/p robotic revision gastric bypass   Plan of care was discussed with patient's nurse    Dispo: Upper GI this morning, if satisfactory will advance to liquid diet, ambulation, incentive spirometry  Possible d/c home this PM if well    This text is generated with voice recognition software  There may be translation, syntax,  or grammatical errors  If you have any questions, please contact the dictating provider

## 2018-11-21 NOTE — DISCHARGE INSTR - AVS FIRST PAGE
your Narcotic meds from [facility] in [address]   Stay hydrated, drink cups of water every 15 mins  Mild nausea is ok as long as you can drink fluids  Take your omeprazole daily  Crush your pills and open capsules, mix with liquid to drink    Follow up with Dr Cruz Salazar  and your PCP within the next week

## 2018-11-21 NOTE — UTILIZATION REVIEW
Initial Clinical Review    Age/Sex: 48 y o  female    Surgery Date:   11/20/2018    Procedure: Preop Diagnosis:  Gastroesophageal reflux disease, esophagitis presence not specified [K21 9]  Paraesophageal hernia [K44 9]  Anemia, unspecified type [D64 9]  Extrinsic asthma, unspecified asthma severity, unspecified whether complicated, unspecified whether persistent [J45 909]  Bariatric surgery status [Z98 84]     Post-Op Diagnosis Codes:     * Gastroesophageal reflux disease, esophagitis presence not specified [K21 9]     * Paraesophageal hernia [K44 9]     * Anemia, unspecified type [D64 9]     * Extrinsic asthma, unspecified asthma severity, unspecified whether complicated, unspecified whether persistent [J45 909]     * Bariatric surgery status [Z98 84]     Procedure(s) (LRB):  LAPAROSCOPIC REVISION OF MARBELLA-EN-Y BYPASS W/ROBOTICS & INTRAOP EGD (N/A)    Anesthesia:    general    Admission Orders: Date/Time/Statement: 11/20/18 @ 1632     Orders Placed This Encounter   Procedures    Inpatient Admission     Standing Status:   Standing     Number of Occurrences:   1     Order Specific Question:   Admitting Physician     Answer:   Rinda Lefort, MAHER [[id number]]     Order Specific Question:   Level of Care     Answer:   Med Surg [16]     Order Specific Question:   Estimated length of stay     Answer:   More than 2 Midnights     Order Specific Question:   Certification     Answer:   I certify that inpatient services are medically necessary for this patient for a duration of greater than two midnights  See H&P and MD Progress Notes for additional information about the patient's course of treatment         Vital Signs: /74 (BP Location: Right arm)   Pulse 68   Temp 98 7 °F (37 1 °C) (Temporal)   Resp 18   Ht 5' 8" (1 727 m)   Wt 119 kg (263 lb 1 oz)   SpO2 100%   BMI 40 00 kg/m²     Diet:        Diet Orders            Start     Ordered    11/20/18 4639  Diet NPO  Diet effective now     Question Answer Comment   Diet Type NPO    RD to adjust diet per protocol? No        11/20/18 1739    11/20/18 1735  Room Service  Once     Question:  Type of Service  Answer:  Room Service-Appropriate    11/20/18 1735          Mobility:   As  lidya    DVT Prophylaxis:   SCD'S    Pain Control:   Pain Medications             escitalopram (LEXAPRO) 20 mg tablet Take 1 tablet by mouth daily    carisoprodol (SOMA) 350 mg tablet Take 1 tablet by mouth as needed          IV  Ancef  Q 8 hrs  IV  Flagyl  Q 8 hrs  IV  protonix  Daily  IVF  75/hr  IV  MSO4  Prn ( x1  11/20 and  X 1  11/21 thus far)  IV  zofran  Prn ( x1  11/20 and  X 1  11/21 thus far)    POST OP PROGRESS  NOTE  11/21  Patient with morbid obesity s/p laparoscopic robotic assisted revision gastric bypass  No nausea or vomiting, pain controlled on oral pain medication, ambulating without assistance, voiding well, using incentive spirometer  Passing flatus  [address] Utilization Review Department  Phone: [phone]; Fax [phone]  [email or web address]  ATTENTION: Please call with any questions or concerns to [phone]  and carefully listen to the prompts so that you are directed to the right person  Send all requests for admission clinical reviews, approved or denied determinations and any other requests to fax [phone]   All voicemails are confidential

## 2018-11-21 NOTE — ASSESSMENT & PLAN NOTE
W/morbid obesity w/failure of prior rygb s/p robotic assisted lap revision of rygb w/intraop egd  Upper gi study w/o leak  Continue diet, ppi, mgmt per 1* team  No barriers for d/c from [facility]

## 2018-11-21 NOTE — DISCHARGE SUMMARY
Discharge Summary - [place] 48 y o  female MRN: [id number]    Unit/Bed#: E5 -01 Encounter: [id number]    Admission Date:   Admission Orders     Ordered        11/20/18 1632  Inpatient Admission  Once               Admitting Diagnosis: Gastroesophageal reflux disease, esophagitis presence not specified [K21 9]  Paraesophageal hernia [K44 9]  Anemia, unspecified type [D64 9]  Extrinsic asthma, unspecified asthma severity, unspecified whether complicated, unspecified whether persistent [J45 909]  Bariatric surgery status [Z98 84]    HPI:  19-year-old female admitted electively for robotic revision of gastric bypass  Procedures Performed:   Robotic revision of gastric bypass  Intraoperative EGD  Placement of intra-abdominal drain    Summary of Hospital Course:  Patient tolerated her surgery well without complications  In the morning of postoperative day 1 the patient had an upper GI study which was negative for any leak of contrast   Patient was started on a bariatric clear liquid diet which she tolerated well without nausea or vomiting  The patient was ambulating and voiding independently  She was deemed ready for discharge home  Significant Findings, Care, Treatment and Services Provided:  Pain medication, antiemetics    Complications:  None    Discharge Diagnosis:  Morbid obesity, status post gastric surgery    Resolved Problems  Date Reviewed: 11/21/2018    None          Condition at Discharge: good         Discharge instructions/Information to patient and family:   See after visit summary for information provided to patient and family  Provisions for Follow-Up Care:  See after visit summary for information related to follow-up care and any pertinent home health orders  PCP: FRANCISCO J Handy    Disposition: Home    Planned Readmission: No    Discharge Statement   I spent 20 minutes discharging the patient  This time was spent on the day of discharge   I had direct contact with the patient on the day of discharge  Additional documentation is required if more than 30 minutes were spent on discharge  Discharge Medications:  See after visit summary for reconciled discharge medications provided to patient and family

## 2018-11-23 ENCOUNTER — TRANSITIONAL CARE MANAGEMENT (OUTPATIENT)
Dept: FAMILY MEDICINE CLINIC | Facility: CLINIC | Age: 53
End: 2018-11-23

## 2018-11-26 ENCOUNTER — TELEPHONE (OUTPATIENT)
Dept: BARIATRICS | Facility: CLINIC | Age: 53
End: 2018-11-26

## 2018-11-26 NOTE — TELEPHONE ENCOUNTER
Patient called service about new worsening rash across chest under breasts  Rash is itchy but not painful  No fevers or chills  She is status post Robotic revision of gastric bypass on 11/20/18 by Dr Marquita Houser  Patient allergic to tape and iodine  Rash likely related to tape from air warmer placed during surgery by Anesthesia to prevent hypothermia  I recommended patient to take Benadryl by mouth as needed and use topical hydrocortisone cream over rash  She was advised to call back or go to ED if rash does not improve or if she develops fever, chills, SOB, or chest pain  Patient verbalized understanding and agreed with plan

## 2018-11-27 ENCOUNTER — TELEPHONE (OUTPATIENT)
Dept: BARIATRICS | Facility: CLINIC | Age: 53
End: 2018-11-27

## 2018-11-27 ENCOUNTER — OFFICE VISIT (OUTPATIENT)
Dept: URGENT CARE | Age: 53
End: 2018-11-27
Payer: COMMERCIAL

## 2018-11-27 VITALS
DIASTOLIC BLOOD PRESSURE: 86 MMHG | HEART RATE: 86 BPM | OXYGEN SATURATION: 98 % | HEIGHT: 67 IN | TEMPERATURE: 97.9 F | BODY MASS INDEX: 41.28 KG/M2 | RESPIRATION RATE: 20 BRPM | SYSTOLIC BLOOD PRESSURE: 136 MMHG | WEIGHT: 263 LBS

## 2018-11-27 DIAGNOSIS — T78.40XA MINOR ALLERGIC REACTION, INITIAL ENCOUNTER: Primary | ICD-10-CM

## 2018-11-27 DIAGNOSIS — L23.1 ALLERGIC CONTACT DERMATITIS DUE TO ADHESIVES: ICD-10-CM

## 2018-11-27 PROCEDURE — S9083 URGENT CARE CENTER GLOBAL: HCPCS | Performed by: FAMILY MEDICINE

## 2018-11-27 PROCEDURE — G0382 LEV 3 HOSP TYPE B ED VISIT: HCPCS | Performed by: FAMILY MEDICINE

## 2018-11-27 RX ORDER — METHYLPREDNISOLONE SODIUM SUCCINATE 40 MG/ML
80 INJECTION, POWDER, LYOPHILIZED, FOR SOLUTION INTRAMUSCULAR; INTRAVENOUS ONCE
Status: DISCONTINUED | OUTPATIENT
Start: 2018-11-27 | End: 2018-11-27

## 2018-11-27 RX ORDER — METHYLPREDNISOLONE SODIUM SUCCINATE 40 MG/ML
80 INJECTION, POWDER, LYOPHILIZED, FOR SOLUTION INTRAMUSCULAR; INTRAVENOUS ONCE
Status: COMPLETED | OUTPATIENT
Start: 2018-11-27 | End: 2018-11-27

## 2018-11-27 RX ADMIN — METHYLPREDNISOLONE SODIUM SUCCINATE 80 MG: 40 INJECTION, POWDER, LYOPHILIZED, FOR SOLUTION INTRAMUSCULAR; INTRAVENOUS at 18:38

## 2018-11-27 NOTE — TELEPHONE ENCOUNTER
Patient called upset due to rash and welts all over abdomen post bariatric sx  I read Dr Zaid Guan telephone note from last night  He advised her to take Benadryl and use hydrocortisone cream as treatment  She said she did start that and there is no relief  I explained to her that is the normal approach for treatment that we recommend and if her symptoms persist or worsen she should go to the ER otherwise to continue advise from surgeon  She was not happy with that

## 2018-11-27 NOTE — TELEPHONE ENCOUNTER
I talked to the patient regarding her rash and recommended that she goes to the emergency department

## 2018-11-27 NOTE — PATIENT INSTRUCTIONS
80 mg IM injection of solu-Medrol given in office  Apply hydrocortisone cream as directed  Monitor for increasing signs of infection: redness, warmth to touch, fever/chills, nausea/vomiting,  discharge, red streaking  Continue taking benadryl  Follow up with PCP in 1-2 days  Go to the ER for worsening symptoms

## 2018-11-27 NOTE — PROGRESS NOTES
[facility]        NAME: Patience Ram is a 48 y o  female  : 1965    MRN: [id number]      Assessment and Plan   Minor allergic reaction, initial encounter [T78 40XA]  1  Minor allergic reaction, initial encounter  methylPREDNISolone sodium succinate (Solu-MEDROL) injection 80 mg   2  Allergic contact dermatitis due to adhesives           Patient Instructions     Patient Instructions   80 mg IM injection of solu-Medrol given in office  Apply hydrocortisone cream as directed  Monitor for increasing signs of infection: redness, warmth to touch, fever/chills, nausea/vomiting,  discharge, red streaking  Continue taking benadryl  Follow up with PCP in 1-2 days  Go to the ER for worsening symptoms  Follow up with PCP in 3-5 days  Proceed to  ER if symptoms worsen  Chief Complaint     Chief Complaint   Patient presents with    Allergic Reaction     rash         History of Present Illness       This is a 59-year-old female complaining of rash on abdomen status post 1 week gastric sleeve  Patient reports she is allergic to adhesive tape  Patient reports daily use adhesive tape for the warming blanket underneath her breasts and on her abdomen  Patient reports rash is very itchy  Patient reports the rash started times 3-4 days ago  Patient reports trying hydrocortisone care Benadryl with minimal relief of symptoms  Patient denies any fever chills, chest pain shortness of breath  Patient denies any lip tongue or throat swelling  Allergic Reaction   Pertinent negatives include no abdominal pain, chest pain, rash or vomiting  Review of Systems   Review of Systems   Constitutional: Negative for chills, fatigue and fever  HENT: Negative for congestion, ear pain, hearing loss, postnasal drip, sinus pain, sinus pressure and sore throat  Eyes: Negative for pain and discharge  Respiratory: Negative for chest tightness and shortness of breath      Cardiovascular: Negative for chest pain  Gastrointestinal: Negative for abdominal pain, constipation, nausea and vomiting  Genitourinary: Negative for difficulty urinating  Musculoskeletal: Negative for arthralgias and myalgias  Skin: Negative for rash  Neurological: Negative for dizziness and headaches  Psychiatric/Behavioral: Negative for behavioral problems           Current Medications       Current Outpatient Prescriptions:     carisoprodol (SOMA) 350 mg tablet, Take 1 tablet by mouth as needed  , Disp: , Rfl:     escitalopram (LEXAPRO) 20 mg tablet, Take 1 tablet by mouth daily, Disp: , Rfl:     levothyroxine 50 mcg tablet, Take 1 tablet (50 mcg total) by mouth daily, Disp: 30 tablet, Rfl: 5    lisinopril (ZESTRIL) 20 mg tablet, TAKE ONE TABLET BY MOUTH DAILY, Disp: 30 tablet, Rfl: 5    LORazepam (ATIVAN) 1 mg tablet, , Disp: , Rfl:     oxyCODONE-acetaminophen (PERCOCET) 5-325 mg per tablet, Take 1 tablet by mouth every 4 (four) hours as needed for moderate pain Max Daily Amount: 6 tablets, Disp: 20 tablet, Rfl: 0    pantoprazole (PROTONIX) 40 mg tablet, TAKE ONE TABLET BY MOUTH ONCE DAILY, Disp: 30 tablet, Rfl: 5    temazepam (RESTORIL) 15 mg capsule, TAKE ONE OR TWO CAPSULES AT BEDTIME AS NEEDED FOR SLEEP, Disp: 60 capsule, Rfl: 3    Current Facility-Administered Medications:     methylPREDNISolone sodium succinate (Solu-MEDROL) injection 80 mg, 80 mg, Intravenous, Once, [employer], PA-C    Current Allergies     Allergies as of 11/27/2018 - Reviewed 11/27/2018   Allergen Reaction Noted    Iodine  01/12/2018    Medical tape  01/12/2018    Meperidine  01/12/2018    Sulfate  01/12/2018            The following portions of the patient's history were reviewed and updated as appropriate: allergies, current medications, past family history, past medical history, past social history, past surgical history and problem list      Past Medical History:   Diagnosis Date    Anemia     Anxiety     Arthritis     Asthma  Bariatric surgery status     Depression     Disease of thyroid gland     hypothyroid    Edema     GERD (gastroesophageal reflux disease)     History of anemia     History of edema     Hypertension     Insomnia     Morbid obesity (HCC)     Postgastrectomy malabsorption     Spinal stenosis        Past Surgical History:   Procedure Laterality Date    ANKLE SURGERY Bilateral     tendon repair    CARPAL TUNNEL RELEASE Bilateral     CHOLECYSTECTOMY      COLONOSCOPY      GASTRIC BYPASS      GASTRIC BYPASS LAPAROSCOPIC N/A 11/20/2018    Procedure: LAPAROSCOPIC REVISION OF MARBELLA-EN-Y BYPASS W/ROBOTICS & INTRAOP EGD;  Surgeon: Polly Jennings MD;  Location: [facility];  Service: Bariatrics    NEUROMA EXCISION      single Dewitt's neuroma    NV EGD TRANSORAL BIOPSY SINGLE/MULTIPLE N/A 8/22/2018    Procedure: ESOPHAGOGASTRODUODENOSCOPY (EGD); Surgeon: Polly Jennings MD;  Location: [facility]; Service: Bariatrics    ROTATOR CUFF REPAIR      left    SHOULDER SURGERY      TONSILLECTOMY      TOTAL HIP ARTHROPLASTY Left     TOTAL KNEE ARTHROPLASTY Right        Family History   Problem Relation Age of Onset    Hypertension Mother     Other Mother         cardiac disorder    Diabetes type II Mother         without complication, with long term use of insulin    Depression Father          Medications have been verified  Objective   /86   Pulse 86   Temp 97 9 °F (36 6 °C)   Resp 20   Ht 5' 7" (1 702 m)   Wt 119 kg (263 lb)   SpO2 98%   BMI 41 19 kg/m²        Physical Exam     Physical Exam   Constitutional: She is oriented to person, place, and time  She appears well-developed and well-nourished  HENT:   Right Ear: Tympanic membrane and external ear normal    Left Ear: Tympanic membrane and external ear normal    Neck: Normal range of motion  No edema present  Cardiovascular: Normal rate, regular rhythm, S1 normal, S2 normal and normal heart sounds      No murmur heard   Pulmonary/Chest: Effort normal and breath sounds normal  No respiratory distress  She has no wheezes  She has no rales  She exhibits no tenderness  Lymphadenopathy:     She has no cervical adenopathy  Neurological: She is alert and oriented to person, place, and time  Skin: Skin is warm, dry and intact  Rash noted  Rash is macular and urticarial  There is erythema  No discharge, no tpp, no warmth to touch  Psychiatric: She has a normal mood and affect  Her speech is normal and behavior is normal    Nursing note and vitals reviewed

## 2018-11-30 ENCOUNTER — OFFICE VISIT (OUTPATIENT)
Dept: BARIATRICS | Facility: CLINIC | Age: 53
End: 2018-11-30

## 2018-11-30 VITALS
DIASTOLIC BLOOD PRESSURE: 70 MMHG | HEIGHT: 68 IN | WEIGHT: 260.5 LBS | RESPIRATION RATE: 18 BRPM | BODY MASS INDEX: 39.48 KG/M2 | TEMPERATURE: 98 F | SYSTOLIC BLOOD PRESSURE: 122 MMHG | HEART RATE: 74 BPM

## 2018-11-30 DIAGNOSIS — Z98.84 BARIATRIC SURGERY STATUS: ICD-10-CM

## 2018-11-30 DIAGNOSIS — Z98.84 BARIATRIC SURGERY STATUS: Primary | ICD-10-CM

## 2018-11-30 DIAGNOSIS — Z98.84 S/P BARIATRIC SURGERY: Primary | ICD-10-CM

## 2018-11-30 PROCEDURE — RECHECK: Performed by: DIETITIAN, REGISTERED

## 2018-11-30 PROCEDURE — 99024 POSTOP FOLLOW-UP VISIT: CPT | Performed by: SURGERY

## 2018-11-30 NOTE — PROGRESS NOTES
Weight Management Nutrition Class     Diagnosis: Morbid Obesity    Bariatric Surgeon: Dr Geri Rene    Surgery: Lap revision of Albert-En-Y    Class: first post op note    Topics discussed today include:     fluid goals post op, protein goals post op, constipation, chew food well, exercise, PPI use, diet progression, dumping syndrome, protein supplems, vitamin/mineral supplements and calcium supplements    Patient was able to verbalize basic diet (protein, fluid, vitamin and mineral) recommendations and possible nutrition-related complications  Yes     Reviewed post-operative pureed and soft foods diet with pt  Discussed gradual diet advancement and portion size progression  Discussed adequate hydration, signs and symptoms of dehydration  Discussed recommended post-operative vitamin supplementation and protein supplements  Discussed importance of regular physical activity  Provided pt with contact information for future questions/concerns

## 2018-11-30 NOTE — PROGRESS NOTES
Assessment/Plan:    Bariatric surgery status  Patient is here for her 1st postoperative visit after robotic revision of gastric bypass  She is doing well and tolerating a diet without nausea vomiting or regurgitation  Her incisions are well healed without signs of infection or hernias  Her HEATHER drain has cirrhosis output  She was see the dietitian today and follow up with us in 4 weeks  We will remove her drain in the office today  Diagnoses and all orders for this visit:    Bariatric surgery status    Other orders  -     SPIRONOLACTONE PO; Take by mouth          Subjective:      Patient ID: Niles Bennett is a 48 y o  female  Patient is a 68-year-old female with a history of gastric bypass coming for her 1st postoperative visit after robotic revision of gastric bypass  At home she developed mild rash in the inframammary crease due to the tape from the warmer in the OR  This improved with Benadryl and hydrocortisone cream   She is otherwise tolerating her diet well without nausea vomiting or regurgitation  Her incisions are well-healed  She denies any fever chills or abdominal pain  The following portions of the patient's history were reviewed and updated as appropriate: allergies, current medications, past family history, past medical history, past social history, past surgical history and problem list     Review of Systems   Constitutional: Negative  HENT: Negative  Respiratory: Negative  Cardiovascular: Negative  Gastrointestinal: Negative  Skin:        Mild rash under both breasts and around all incisions  Objective:      /70   Pulse 74   Temp 98 °F (36 7 °C)   Resp 18   Ht 5' 7 5" (1 715 m)   Wt 118 kg (260 lb 8 oz)   BMI 40 20 kg/m²          Physical Exam   Constitutional: She is oriented to person, place, and time  She appears well-developed and well-nourished  No distress  Cardiovascular: Normal rate and regular rhythm      Pulmonary/Chest: Effort normal and breath sounds normal    Abdominal: Soft  Bowel sounds are normal  She exhibits no distension  There is no tenderness  There is no rebound and no guarding  Abdomen soft nontender nondistended  All incisions well-healed without signs of infection or hernia  Right upper quadrant HEATHER drain with cirrhosis fluid and no signs of infection at the skin site  Mild rash around incisions and low both breasts  Musculoskeletal: She exhibits no edema  Neurological: She is oriented to person, place, and time  Skin: Rash noted  She is not diaphoretic  Psychiatric: She has a normal mood and affect   Her behavior is normal

## 2018-11-30 NOTE — ASSESSMENT & PLAN NOTE
Patient is here for her 1st postoperative visit after robotic revision of gastric bypass  She is doing well and tolerating a diet without nausea vomiting or regurgitation  Her incisions are well healed without signs of infection or hernias  Her HEATHER drain has cirrhosis output  She was see the dietitian today and follow up with us in 4 weeks  We will remove her drain in the office today

## 2018-11-30 NOTE — LETTER
November 30, 2018     [facility] [address]  [address]  [address] [place], [place] [id number]    Patient: Lavern Kam   YOB: 1965   Date of Visit: 11/30/2018       Dear Dr Izaiah Polo:    Thank you for referring Lavern Kam to me for evaluation  Below are my notes for this consultation  If you have questions, please do not hesitate to call me  I look forward to following your patient along with you  Sincerely,        Vilma Montero MD        CC: No Recipients  Omar Hall MD  11/30/2018  9:54 AM  Sign at close encounter  Assessment/Plan:    Bariatric surgery status  Patient is here for her 1st postoperative visit after robotic revision of gastric bypass  She is doing well and tolerating a diet without nausea vomiting or regurgitation  Her incisions are well healed without signs of infection or hernias  Her HEATHER drain has cirrhosis output  She was see the dietitian today and follow up with us in 4 weeks  We will remove her drain in the office today  Diagnoses and all orders for this visit:    Bariatric surgery status    Other orders  -     SPIRONOLACTONE PO; Take by mouth          Subjective:      Patient ID: Lavern Kam is a 48 y o  female  Patient is a 27-year-old female with a history of gastric bypass coming for her 1st postoperative visit after robotic revision of gastric bypass  At home she developed mild rash in the inframammary crease due to the tape from the warmer in the OR  This improved with Benadryl and hydrocortisone cream   She is otherwise tolerating her diet well without nausea vomiting or regurgitation  Her incisions are well-healed  She denies any fever chills or abdominal pain  The following portions of the patient's history were reviewed and updated as appropriate: allergies, current medications, past family history, past medical history, past social history, past surgical history and problem list     Review of Systems   Constitutional: Negative  HENT: Negative  Respiratory: Negative  Cardiovascular: Negative  Gastrointestinal: Negative  Skin:        Mild rash under both breasts and around all incisions  Objective:      /70   Pulse 74   Temp 98 °F (36 7 °C)   Resp 18   Ht 5' 7 5" (1 715 m)   Wt 118 kg (260 lb 8 oz)   BMI 40 20 kg/m²           Physical Exam   Constitutional: She is oriented to person, place, and time  She appears well-developed and well-nourished  No distress  Cardiovascular: Normal rate and regular rhythm  Pulmonary/Chest: Effort normal and breath sounds normal    Abdominal: Soft  Bowel sounds are normal  She exhibits no distension  There is no tenderness  There is no rebound and no guarding  Abdomen soft nontender nondistended  All incisions well-healed without signs of infection or hernia  Right upper quadrant HEATHER drain with cirrhosis fluid and no signs of infection at the skin site  Mild rash around incisions and low both breasts  Musculoskeletal: She exhibits no edema  Neurological: She is oriented to person, place, and time  Skin: Rash noted  She is not diaphoretic  Psychiatric: She has a normal mood and affect   Her behavior is normal

## 2018-12-07 DIAGNOSIS — F41.9 ANXIETY: ICD-10-CM

## 2018-12-07 RX ORDER — LORAZEPAM 1 MG/1
TABLET ORAL
Qty: 30 TABLET | Refills: 1 | Status: SHIPPED | OUTPATIENT
Start: 2018-12-07 | End: 2019-02-06 | Stop reason: SDUPTHER

## 2019-01-05 DIAGNOSIS — E03.8 HYPOTHYROIDISM DUE TO HASHIMOTO'S THYROIDITIS: ICD-10-CM

## 2019-01-05 DIAGNOSIS — E06.3 HYPOTHYROIDISM DUE TO HASHIMOTO'S THYROIDITIS: ICD-10-CM

## 2019-01-07 RX ORDER — LEVOTHYROXINE SODIUM 0.05 MG/1
TABLET ORAL
Qty: 30 TABLET | Refills: 5 | Status: SHIPPED | OUTPATIENT
Start: 2019-01-07 | End: 2019-08-06 | Stop reason: SDUPTHER

## 2019-02-06 ENCOUNTER — CLINICAL SUPPORT (OUTPATIENT)
Dept: BARIATRICS | Facility: CLINIC | Age: 54
End: 2019-02-06

## 2019-02-06 VITALS — WEIGHT: 247.3 LBS | HEIGHT: 68 IN | BODY MASS INDEX: 37.48 KG/M2

## 2019-02-06 DIAGNOSIS — Z98.84 S/P BARIATRIC SURGERY: Primary | ICD-10-CM

## 2019-02-06 DIAGNOSIS — E66.9 OBESITY, CLASS II, BMI 35-39.9: ICD-10-CM

## 2019-02-06 DIAGNOSIS — F41.9 ANXIETY: ICD-10-CM

## 2019-02-06 PROCEDURE — RECHECK: Performed by: DIETITIAN, REGISTERED

## 2019-02-06 RX ORDER — ESCITALOPRAM OXALATE 20 MG/1
TABLET ORAL
Qty: 30 TABLET | Refills: 5 | Status: SHIPPED | OUTPATIENT
Start: 2019-02-06 | End: 2019-09-23 | Stop reason: SDUPTHER

## 2019-02-06 RX ORDER — LORAZEPAM 1 MG/1
TABLET ORAL
Qty: 30 TABLET | Refills: 1 | Status: SHIPPED | OUTPATIENT
Start: 2019-02-06 | End: 2019-04-20 | Stop reason: SDUPTHER

## 2019-02-06 NOTE — PROGRESS NOTES
Weight Management Nutrition Class     Diagnosis: Morbid Obesity    Bariatric Surgeon: Dr Meño Riddle    Surgery: laparoscopic revision with hernia repair    Class: 5 week post op     Topics discussed today include:     fluid goals post op, protein goals post op, constipation, chew food well, exercise, avoidance of alcohol, PPI use, diet progression, dumping syndrome, protein supplems, vitamin/mineral supplements, calcium supplements, additional vitamin B12, iron supplements, fat soluble vitamins  and provided with sample of Premier protein water     Patient was able to verbalize basic diet (protein, fluid, vitamin and mineral) recommendations and possible nutrition-related complications   Yes

## 2019-02-21 DIAGNOSIS — M48.00 SPINAL STENOSIS, UNSPECIFIED SPINAL REGION: ICD-10-CM

## 2019-02-21 DIAGNOSIS — K21.9 GASTROESOPHAGEAL REFLUX DISEASE WITHOUT ESOPHAGITIS: ICD-10-CM

## 2019-02-21 DIAGNOSIS — I10 BENIGN HYPERTENSION: Primary | ICD-10-CM

## 2019-02-21 DIAGNOSIS — F51.04 PSYCHOPHYSIOLOGICAL INSOMNIA: ICD-10-CM

## 2019-03-07 DIAGNOSIS — I10 ESSENTIAL HYPERTENSION: ICD-10-CM

## 2019-03-07 DIAGNOSIS — F51.01 PRIMARY INSOMNIA: ICD-10-CM

## 2019-03-07 RX ORDER — LISINOPRIL 20 MG/1
TABLET ORAL
Qty: 30 TABLET | Refills: 5 | Status: SHIPPED | OUTPATIENT
Start: 2019-03-07 | End: 2019-09-25 | Stop reason: SDUPTHER

## 2019-03-07 RX ORDER — TEMAZEPAM 15 MG/1
CAPSULE ORAL
Qty: 60 CAPSULE | Refills: 3 | Status: SHIPPED | OUTPATIENT
Start: 2019-03-07 | End: 2019-03-08

## 2019-03-08 ENCOUNTER — OFFICE VISIT (OUTPATIENT)
Dept: FAMILY MEDICINE CLINIC | Facility: CLINIC | Age: 54
End: 2019-03-08
Payer: COMMERCIAL

## 2019-03-08 VITALS
WEIGHT: 248.2 LBS | RESPIRATION RATE: 16 BRPM | OXYGEN SATURATION: 95 % | BODY MASS INDEX: 38.3 KG/M2 | DIASTOLIC BLOOD PRESSURE: 70 MMHG | HEART RATE: 59 BPM | SYSTOLIC BLOOD PRESSURE: 110 MMHG

## 2019-03-08 DIAGNOSIS — J45.20 ASTHMA, MILD INTERMITTENT, WELL-CONTROLLED: ICD-10-CM

## 2019-03-08 DIAGNOSIS — Z12.31 ENCOUNTER FOR SCREENING MAMMOGRAM FOR MALIGNANT NEOPLASM OF BREAST: ICD-10-CM

## 2019-03-08 DIAGNOSIS — F51.04 PSYCHOPHYSIOLOGICAL INSOMNIA: ICD-10-CM

## 2019-03-08 DIAGNOSIS — I10 BENIGN HYPERTENSION: Primary | ICD-10-CM

## 2019-03-08 DIAGNOSIS — F32.A ANXIETY AND DEPRESSION: ICD-10-CM

## 2019-03-08 DIAGNOSIS — K21.9 GASTROESOPHAGEAL REFLUX DISEASE WITHOUT ESOPHAGITIS: ICD-10-CM

## 2019-03-08 DIAGNOSIS — F41.9 ANXIETY AND DEPRESSION: ICD-10-CM

## 2019-03-08 PROCEDURE — 3074F SYST BP LT 130 MM HG: CPT | Performed by: NURSE PRACTITIONER

## 2019-03-08 PROCEDURE — 3078F DIAST BP <80 MM HG: CPT | Performed by: NURSE PRACTITIONER

## 2019-03-08 PROCEDURE — 99214 OFFICE O/P EST MOD 30 MIN: CPT | Performed by: NURSE PRACTITIONER

## 2019-03-08 RX ORDER — TRAZODONE HYDROCHLORIDE 50 MG/1
50 TABLET ORAL
Qty: 30 TABLET | Refills: 1 | Status: SHIPPED | OUTPATIENT
Start: 2019-03-08 | End: 2019-09-13

## 2019-03-08 NOTE — PROGRESS NOTES
Assessment/Plan:           Problem List Items Addressed This Visit        Digestive    Gastroesophageal reflux disease     Stable  Cont current meds              Respiratory    Asthma, well controlled       Cardiovascular and Mediastinum    Benign hypertension - Primary     Controlled  Cont current meds              Other    Anxiety and depression     Stable  Cont current meds           Insomnia     Stable  Cont current meds           Relevant Medications    traZODone (DESYREL) 50 mg tablet      Other Visit Diagnoses     Encounter for screening mammogram for malignant neoplasm of breast        Relevant Orders    Mammo screening bilateral w cad            Subjective:      Patient ID: Juanito Whitley is a 48 y o  female  Here for f/u to chronic medical conditions  Had her revision from gastric bypass  Went well except for tape they used on her skin and her allergic reaction  Doing well with weight loss  Due for labs  No flu shot this season  Will get pap here in office  Due for mammogram  [place] made her sleep walk  Temazepam not helping her fall asleep but for now wants to stay on it   belsomra did not work  Melatonin never worked for her  Never tried trazodone  lunesta did not work          The following portions of the patient's history were reviewed and updated as appropriate: allergies, current medications, past family history, past medical history, past social history, past surgical history and problem list     Review of Systems   Constitutional: Positive for fatigue  Negative for activity change, appetite change, chills and fever  HENT: Negative for congestion, postnasal drip and rhinorrhea  Eyes: Negative for photophobia and visual disturbance  Respiratory: Negative for cough, shortness of breath and wheezing  Cardiovascular: Negative for chest pain, palpitations and leg swelling  Gastrointestinal: Negative for abdominal distention, abdominal pain, constipation, diarrhea, nausea and vomiting  Endocrine: Negative for polydipsia, polyphagia and polyuria  Genitourinary: Negative for dysuria and frequency  Musculoskeletal: Negative for back pain, gait problem and myalgias  Skin: Negative for rash  Neurological: Negative for dizziness, syncope, light-headedness and headaches  Hematological: Negative for adenopathy  Psychiatric/Behavioral: Positive for sleep disturbance  Negative for dysphoric mood and suicidal ideas  The patient is not hyperactive  Objective:      /70 (BP Location: Left arm, Patient Position: Sitting, Cuff Size: Standard)   Pulse 59   Resp 16   Wt 113 kg (248 lb 3 2 oz)   SpO2 95%   BMI 38 30 kg/m²     Family History   Problem Relation Age of Onset    Hypertension Mother     Other Mother         cardiac disorder    Diabetes type II Mother         without complication, with long term use of insulin    Depression Father      Past Medical History:   Diagnosis Date    Anemia     Anxiety     Arthritis     Asthma     Bariatric surgery status     Depression     Disease of thyroid gland     hypothyroid    Edema     GERD (gastroesophageal reflux disease)     History of anemia     History of edema     Hypertension     Insomnia     Morbid obesity ([address] )     Postgastrectomy malabsorption     Spinal stenosis      Social History     Socioeconomic History    Marital status: /Civil Union     Spouse name: Not on file    Number of children: Not on file    Years of education: Not on file    Highest education level: Not on file   Occupational History    Not on file   Social Needs    Financial resource strain: Not on file    Food insecurity:     Worry: Not on file     Inability: Not on file    Transportation needs:     Medical: Not on file     Non-medical: Not on file   Tobacco Use    Smoking status: Never Smoker    Smokeless tobacco: Never Used   Substance and Sexual Activity    Alcohol use: Not Currently     Comment: social     Drug use:  No  Sexual activity: Not Currently     Partners: Male   Lifestyle    Physical activity:     Days per week: Not on file     Minutes per session: Not on file    Stress: Not on file   Relationships    Social connections:     Talks on phone: Not on file     Gets together: Not on file     Attends Gnosticism service: Not on file     Active member of club or organization: Not on file     Attends meetings of clubs or organizations: Not on file     Relationship status: Not on file    Intimate partner violence:     Fear of current or ex partner: Not on file     Emotionally abused: Not on file     Physically abused: Not on file     Forced sexual activity: Not on file   Other Topics Concern    Not on file   Social History Narrative    Not on file       Current Outpatient Medications:     carisoprodol (SOMA) 350 mg tablet, Take 1 tablet by mouth as needed  , Disp: , Rfl:     escitalopram (LEXAPRO) 20 mg tablet, TAKE ONE TABLET BY MOUTH DAILY, Disp: 30 tablet, Rfl: 5    levothyroxine 50 mcg tablet, TAKE 1 TABLET BY MOUTH DAILY, Disp: 30 tablet, Rfl: 5    lisinopril (ZESTRIL) 20 mg tablet, TAKE 1 TABLET BY MOUTH DAILY, Disp: 30 tablet, Rfl: 5    LORazepam (ATIVAN) 1 mg tablet, , Disp: , Rfl:     LORazepam (ATIVAN) 1 mg tablet, TAKE 1 TABLET BY MOUTH TWICE DAILY AS NEEDED FOR ANXIETY, Disp: 30 tablet, Rfl: 1    pantoprazole (PROTONIX) 40 mg tablet, TAKE ONE TABLET BY MOUTH ONCE DAILY, Disp: 30 tablet, Rfl: 5    SPIRONOLACTONE PO, Take by mouth, Disp: , Rfl:     traZODone (DESYREL) 50 mg tablet, Take 1 tablet (50 mg total) by mouth daily at bedtime, Disp: 30 tablet, Rfl: 1  Allergies   Allergen Reactions    Iodine      IV dye  Uncontrolled sneezing      Medical Tape     Meperidine     Sulfate         Physical Exam   Constitutional: She is oriented to person, place, and time  She appears well-developed and well-nourished  HENT:   Head: Normocephalic and atraumatic     Right Ear: External ear normal    Left Ear: External ear normal    Nose: Nose normal    Mouth/Throat: Oropharynx is clear and moist    Eyes: Conjunctivae are normal    Neck: Normal range of motion  Neck supple  Cardiovascular: Normal rate, regular rhythm and normal heart sounds  Pulmonary/Chest: Effort normal and breath sounds normal    Abdominal: Soft  Bowel sounds are normal    Musculoskeletal: Normal range of motion  Neurological: She is alert and oriented to person, place, and time  Skin: Skin is warm and dry  Capillary refill takes less than 2 seconds  Psychiatric: She has a normal mood and affect  Her behavior is normal  Judgment and thought content normal    Nursing note and vitals reviewed  No results found for this or any previous visit (from the past 1008 hour(s))

## 2019-03-13 PROBLEM — E06.3 HASHIMOTO'S DISEASE: Status: ACTIVE | Noted: 2019-03-13

## 2019-03-13 PROBLEM — K21.9 CHRONIC GERD: Status: RESOLVED | Noted: 2018-01-12 | Resolved: 2019-03-13

## 2019-04-06 DIAGNOSIS — K21.9 GASTROESOPHAGEAL REFLUX DISEASE WITHOUT ESOPHAGITIS: ICD-10-CM

## 2019-04-08 RX ORDER — PANTOPRAZOLE SODIUM 40 MG/1
TABLET, DELAYED RELEASE ORAL
Qty: 30 TABLET | Refills: 5 | Status: SHIPPED | OUTPATIENT
Start: 2019-04-08 | End: 2019-11-18 | Stop reason: SDUPTHER

## 2019-04-17 ENCOUNTER — APPOINTMENT (OUTPATIENT)
Dept: RADIOLOGY | Facility: MEDICAL CENTER | Age: 54
End: 2019-04-17
Payer: COMMERCIAL

## 2019-04-17 ENCOUNTER — OFFICE VISIT (OUTPATIENT)
Dept: URGENT CARE | Facility: MEDICAL CENTER | Age: 54
End: 2019-04-17
Payer: COMMERCIAL

## 2019-04-17 VITALS
HEIGHT: 68 IN | WEIGHT: 237 LBS | BODY MASS INDEX: 35.92 KG/M2 | TEMPERATURE: 97.3 F | DIASTOLIC BLOOD PRESSURE: 73 MMHG | SYSTOLIC BLOOD PRESSURE: 142 MMHG | HEART RATE: 62 BPM | OXYGEN SATURATION: 99 % | RESPIRATION RATE: 16 BRPM

## 2019-04-17 DIAGNOSIS — S49.92XA INJURY OF LEFT SHOULDER, INITIAL ENCOUNTER: ICD-10-CM

## 2019-04-17 DIAGNOSIS — S29.019A THORACIC MYOFASCIAL STRAIN, INITIAL ENCOUNTER: Primary | ICD-10-CM

## 2019-04-17 PROCEDURE — 73030 X-RAY EXAM OF SHOULDER: CPT

## 2019-04-17 PROCEDURE — G0382 LEV 3 HOSP TYPE B ED VISIT: HCPCS | Performed by: FAMILY MEDICINE

## 2019-04-17 PROCEDURE — S9083 URGENT CARE CENTER GLOBAL: HCPCS | Performed by: FAMILY MEDICINE

## 2019-04-17 RX ORDER — METHOCARBAMOL 500 MG/1
500 TABLET, FILM COATED ORAL 4 TIMES DAILY
Qty: 30 TABLET | Refills: 0 | Status: SHIPPED | OUTPATIENT
Start: 2019-04-17 | End: 2019-09-13

## 2019-04-17 RX ORDER — SPIRONOLACTONE 50 MG/1
TABLET, FILM COATED ORAL
Refills: 3 | COMMUNITY
Start: 2019-04-06 | End: 2019-09-25 | Stop reason: CLARIF

## 2019-04-17 RX ORDER — NAPROXEN 500 MG/1
500 TABLET ORAL 2 TIMES DAILY WITH MEALS
Qty: 20 TABLET | Refills: 0 | Status: SHIPPED | OUTPATIENT
Start: 2019-04-17 | End: 2019-09-13

## 2019-04-20 DIAGNOSIS — F41.9 ANXIETY: ICD-10-CM

## 2019-04-22 RX ORDER — LORAZEPAM 1 MG/1
TABLET ORAL
Qty: 30 TABLET | Refills: 3 | Status: SHIPPED | OUTPATIENT
Start: 2019-04-22 | End: 2019-08-28 | Stop reason: SDUPTHER

## 2019-05-10 ENCOUNTER — TELEPHONE (OUTPATIENT)
Dept: BARIATRICS | Facility: CLINIC | Age: 54
End: 2019-05-10

## 2019-07-05 DIAGNOSIS — F51.01 PRIMARY INSOMNIA: ICD-10-CM

## 2019-07-05 RX ORDER — TEMAZEPAM 15 MG/1
CAPSULE ORAL
Qty: 60 CAPSULE | Refills: 3 | Status: SHIPPED | OUTPATIENT
Start: 2019-07-05 | End: 2019-11-02 | Stop reason: SDUPTHER

## 2019-07-19 ENCOUNTER — HOSPITAL ENCOUNTER (OUTPATIENT)
Dept: MAMMOGRAPHY | Facility: MEDICAL CENTER | Age: 54
Discharge: HOME | End: 2019-07-19
Payer: COMMERCIAL

## 2019-07-19 VITALS — HEIGHT: 68 IN | BODY MASS INDEX: 35.92 KG/M2 | WEIGHT: 237 LBS

## 2019-07-19 DIAGNOSIS — Z12.31 ENCOUNTER FOR SCREENING MAMMOGRAM FOR MALIGNANT NEOPLASM OF BREAST: ICD-10-CM

## 2019-07-19 PROCEDURE — 77067 SCR MAMMO BI INCL CAD: CPT

## 2019-08-05 ENCOUNTER — TELEPHONE (OUTPATIENT)
Dept: FAMILY MEDICINE CLINIC | Facility: CLINIC | Age: 54
End: 2019-08-05

## 2019-08-06 DIAGNOSIS — E06.3 HYPOTHYROIDISM DUE TO HASHIMOTO'S THYROIDITIS: ICD-10-CM

## 2019-08-06 DIAGNOSIS — E03.8 HYPOTHYROIDISM DUE TO HASHIMOTO'S THYROIDITIS: ICD-10-CM

## 2019-08-06 RX ORDER — LEVOTHYROXINE SODIUM 0.05 MG/1
TABLET ORAL
Qty: 30 TABLET | Refills: 5 | Status: SHIPPED | OUTPATIENT
Start: 2019-08-06 | End: 2020-02-19

## 2019-08-28 DIAGNOSIS — F41.9 ANXIETY: ICD-10-CM

## 2019-08-28 RX ORDER — LORAZEPAM 1 MG/1
TABLET ORAL
Qty: 30 TABLET | Refills: 3 | Status: SHIPPED | OUTPATIENT
Start: 2019-08-28 | End: 2019-12-22 | Stop reason: SDUPTHER

## 2019-09-11 LAB
ALBUMIN SERPL-MCNC: 4.1 G/DL (ref 3.6–5.1)
ALBUMIN/GLOB SERPL: 1.6 (CALC) (ref 1–2.5)
ALP SERPL-CCNC: 86 U/L (ref 33–130)
ALT SERPL-CCNC: 24 U/L (ref 6–29)
AST SERPL-CCNC: 26 U/L (ref 10–35)
BASOPHILS # BLD AUTO: 28 CELLS/UL (ref 0–200)
BASOPHILS NFR BLD AUTO: 0.5 %
BILIRUB SERPL-MCNC: 1 MG/DL (ref 0.2–1.2)
BUN SERPL-MCNC: 15 MG/DL (ref 7–25)
BUN/CREAT SERPL: NORMAL (CALC) (ref 6–22)
CALCIUM SERPL-MCNC: 9.2 MG/DL (ref 8.6–10.4)
CHLORIDE SERPL-SCNC: 103 MMOL/L (ref 98–110)
CO2 SERPL-SCNC: 30 MMOL/L (ref 20–32)
CREAT SERPL-MCNC: 0.85 MG/DL (ref 0.5–1.05)
EOSINOPHIL # BLD AUTO: 143 CELLS/UL (ref 15–500)
EOSINOPHIL NFR BLD AUTO: 2.6 %
ERYTHROCYTE [DISTWIDTH] IN BLOOD BY AUTOMATED COUNT: 13.2 % (ref 11–15)
GLOBULIN SER CALC-MCNC: 2.6 G/DL (CALC) (ref 1.9–3.7)
GLUCOSE SERPL-MCNC: 103 MG/DL (ref 65–139)
HCT VFR BLD AUTO: 40.8 % (ref 35–45)
HGB BLD-MCNC: 12.9 G/DL (ref 11.7–15.5)
LYMPHOCYTES # BLD AUTO: 1683 CELLS/UL (ref 850–3900)
LYMPHOCYTES NFR BLD AUTO: 30.6 %
MCH RBC QN AUTO: 31.2 PG (ref 27–33)
MCHC RBC AUTO-ENTMCNC: 31.6 G/DL (ref 32–36)
MCV RBC AUTO: 98.8 FL (ref 80–100)
MONOCYTES # BLD AUTO: 473 CELLS/UL (ref 200–950)
MONOCYTES NFR BLD AUTO: 8.6 %
NEUTROPHILS # BLD AUTO: 3174 CELLS/UL (ref 1500–7800)
NEUTROPHILS NFR BLD AUTO: 57.7 %
PLATELET # BLD AUTO: 275 THOUSAND/UL (ref 140–400)
PMV BLD REES-ECKER: 9.4 FL (ref 7.5–12.5)
POTASSIUM SERPL-SCNC: 4.4 MMOL/L (ref 3.5–5.3)
PROT SERPL-MCNC: 6.7 G/DL (ref 6.1–8.1)
RBC # BLD AUTO: 4.13 MILLION/UL (ref 3.8–5.1)
SL AMB EGFR AFRICAN AMERICAN: 90 ML/MIN/1.73M2
SL AMB EGFR NON AFRICAN AMERICAN: 78 ML/MIN/1.73M2
SODIUM SERPL-SCNC: 138 MMOL/L (ref 135–146)
TSH SERPL-ACNC: 2.25 MIU/L
WBC # BLD AUTO: 5.5 THOUSAND/UL (ref 3.8–10.8)

## 2019-09-13 ENCOUNTER — OFFICE VISIT (OUTPATIENT)
Dept: FAMILY MEDICINE CLINIC | Facility: CLINIC | Age: 54
End: 2019-09-13
Payer: COMMERCIAL

## 2019-09-13 VITALS
WEIGHT: 237.4 LBS | BODY MASS INDEX: 35.98 KG/M2 | OXYGEN SATURATION: 98 % | DIASTOLIC BLOOD PRESSURE: 80 MMHG | SYSTOLIC BLOOD PRESSURE: 130 MMHG | HEIGHT: 68 IN | RESPIRATION RATE: 12 BRPM | HEART RATE: 64 BPM

## 2019-09-13 DIAGNOSIS — Z12.11 SCREENING FOR COLON CANCER: ICD-10-CM

## 2019-09-13 DIAGNOSIS — Z12.4 ENCOUNTER FOR PAPANICOLAOU SMEAR FOR CERVICAL CANCER SCREENING: Primary | ICD-10-CM

## 2019-09-13 DIAGNOSIS — R10.2 PELVIC PAIN: ICD-10-CM

## 2019-09-13 DIAGNOSIS — N94.10 DYSPAREUNIA IN FEMALE: ICD-10-CM

## 2019-09-13 PROCEDURE — 99396 PREV VISIT EST AGE 40-64: CPT | Performed by: NURSE PRACTITIONER

## 2019-09-13 PROCEDURE — 3008F BODY MASS INDEX DOCD: CPT | Performed by: NURSE PRACTITIONER

## 2019-09-13 PROCEDURE — S0612 ANNUAL GYNECOLOGICAL EXAMINA: HCPCS | Performed by: NURSE PRACTITIONER

## 2019-09-13 PROCEDURE — G0145 SCR C/V CYTO,THINLAYER,RESCR: HCPCS | Performed by: NURSE PRACTITIONER

## 2019-09-13 NOTE — PROGRESS NOTES
Mary Kate Jones is a 47 y o  female here for a routine exam   Current complaints: bleeding after intercourse and pain with intercourse  Personal health questionnaire reviewed: yes  Gynecologic History  No LMP recorded  Patient is postmenopausal   Contraception: none  Last Pap: 2017  Results were: normal  Last mammogram: 2018  Results were: normal    Obstetric History  OB History    Para Term  AB Living       0         SAB TAB Ectopic Multiple Live Births                     The following portions of the patient's history were reviewed and updated as appropriate: allergies, current medications, past family history, past medical history, past social history, past surgical history and problem list     Review of Systems  Constitutional: negative  Cardiovascular: negative  Gastrointestinal: negative  Genitourinary:negative  Endocrine: negative      Objective     General appearance: alert and oriented, in no acute distress  Neck: no adenopathy, no carotid bruit, no JVD, supple, symmetrical, trachea midline and thyroid not enlarged, symmetric, no tenderness/mass/nodules  Lungs: clear to auscultation bilaterally  Heart: regular rate and rhythm, S1, S2 normal, no murmur, click, rub or gallop  Abdomen: soft, non-tender; bowel sounds normal; no masses,  no organomegaly  Pelvic: external genitalia normal, vagina normal without discharge, uterus normal size, shape, and consistency, no cervical motion tenderness, cervix normal in appearance, no adnexal masses or tenderness, rectovaginal septum normal and urethral meatus  Extremities: extremities normal, warm and well-perfused; no cyanosis, clubbing, or edema  Skin: Skin color, texture, turgor normal  No rashes or lesions  Lymph nodes: Cervical, supraclavicular, and axillary nodes normal         BMI Counseling: Body mass index is 36 63 kg/m²   The BMI is above normal  Nutrition recommendations include reducing portion sizes, decreasing overall calorie intake, 3-5 servings of fruits/vegetables daily, reducing fast food intake, consuming healthier snacks, decreasing soda and/or juice intake, moderation in carbohydrate intake, increasing intake of lean protein, reducing intake of saturated fat and trans fat and reducing intake of cholesterol  Exercise recommendations include moderate aerobic physical activity for 150 minutes/week, exercising 3-5 times per week and strength training exercises  Assessment     Healthy female exam       Plan     Mammogram ordered  Follow up in: 1 year

## 2019-09-16 PROBLEM — Z12.4 ENCOUNTER FOR PAPANICOLAOU SMEAR FOR CERVICAL CANCER SCREENING: Status: ACTIVE | Noted: 2018-10-10

## 2019-09-16 PROBLEM — R10.2 PELVIC PAIN: Status: ACTIVE | Noted: 2019-09-16

## 2019-09-16 PROBLEM — N94.10 DYSPAREUNIA IN FEMALE: Status: ACTIVE | Noted: 2019-09-16

## 2019-09-18 LAB
LAB AP GYN PRIMARY INTERPRETATION: NORMAL
LAB AP LMP: NORMAL
Lab: NORMAL

## 2019-09-20 ENCOUNTER — HOSPITAL ENCOUNTER (OUTPATIENT)
Dept: ULTRASOUND IMAGING | Facility: MEDICAL CENTER | Age: 54
Discharge: HOME | End: 2019-09-20
Payer: COMMERCIAL

## 2019-09-20 DIAGNOSIS — R10.2 PELVIC PAIN: ICD-10-CM

## 2019-09-20 DIAGNOSIS — N94.10 DYSPAREUNIA IN FEMALE: ICD-10-CM

## 2019-09-20 PROCEDURE — 76856 US EXAM PELVIC COMPLETE: CPT

## 2019-09-20 PROCEDURE — 76830 TRANSVAGINAL US NON-OB: CPT

## 2019-09-23 DIAGNOSIS — F41.9 ANXIETY: ICD-10-CM

## 2019-09-23 RX ORDER — ESCITALOPRAM OXALATE 20 MG/1
TABLET ORAL
Qty: 30 TABLET | Refills: 5 | Status: SHIPPED | OUTPATIENT
Start: 2019-09-23 | End: 2020-03-30

## 2019-09-25 ENCOUNTER — OFFICE VISIT (OUTPATIENT)
Dept: URGENT CARE | Facility: MEDICAL CENTER | Age: 54
End: 2019-09-25
Payer: COMMERCIAL

## 2019-09-25 VITALS
HEIGHT: 67 IN | RESPIRATION RATE: 16 BRPM | TEMPERATURE: 98 F | SYSTOLIC BLOOD PRESSURE: 122 MMHG | BODY MASS INDEX: 37.2 KG/M2 | OXYGEN SATURATION: 100 % | WEIGHT: 237 LBS | HEART RATE: 55 BPM | DIASTOLIC BLOOD PRESSURE: 84 MMHG

## 2019-09-25 DIAGNOSIS — I10 HYPERTENSION, UNSPECIFIED TYPE: ICD-10-CM

## 2019-09-25 DIAGNOSIS — H18.822 CORNEAL ABRASION DUE TO CONTACT LENS, LEFT: Primary | ICD-10-CM

## 2019-09-25 DIAGNOSIS — I10 ESSENTIAL HYPERTENSION: ICD-10-CM

## 2019-09-25 PROCEDURE — 99212 OFFICE O/P EST SF 10 MIN: CPT | Performed by: PHYSICIAN ASSISTANT

## 2019-09-25 RX ORDER — SPIRONOLACTONE 50 MG/1
TABLET, FILM COATED ORAL
Qty: 90 TABLET | Refills: 3 | Status: SHIPPED | OUTPATIENT
Start: 2019-09-25 | End: 2020-05-15 | Stop reason: SDUPTHER

## 2019-09-25 RX ORDER — LISINOPRIL 20 MG/1
TABLET ORAL
Qty: 30 TABLET | Refills: 5 | Status: SHIPPED | OUTPATIENT
Start: 2019-09-25 | End: 2020-03-30

## 2019-09-25 RX ORDER — OFLOXACIN 3 MG/ML
1 SOLUTION/ DROPS OPHTHALMIC 4 TIMES DAILY
Qty: 5 ML | Refills: 0 | Status: SHIPPED | OUTPATIENT
Start: 2019-09-25 | End: 2019-10-02

## 2019-09-26 NOTE — PROGRESS NOTES
[facility]        NAME: Lebron Taylor is a 47 y o  female  : 1965    MRN: [id number]  DATE: 2019  TIME: 9:49 PM    Assessment and Plan   Corneal abrasion due to contact lens, left [H18 [id number]]  1  Corneal abrasion due to contact lens, left  ofloxacin (OCUFLOX) 0 3 % ophthalmic solution         Patient Instructions     Use eyedrops as directed for the next 7 days  No contacts for next 7 days  Follow up with PCP in 3-5 days  Proceed to  ER if symptoms worsen  Chief Complaint     Chief Complaint   Patient presents with    Eye Problem     Pt tried to remove left contact this evening and cannot tell if she was successful  C/o left eye redness and pain  History of Present Illness       51-year-old female presents with a left eye injury  Patient reports she was trying to take her contact of her left eye and may have injured her eye  Patient also not sure if the contact is still in her eye  Patient reports foreign body sensation in left eye  Patient reports that she just started trying use contacts in her eyes however is having trouble with it    Eye Problem    The left eye is affected  This is a new problem  The current episode started today  The problem occurs constantly  The problem has been unchanged  The injury mechanism was contact lenses  The pain is moderate  There is no known exposure to pink eye  She wears contacts  Associated symptoms include eye redness and a foreign body sensation  Pertinent negatives include no blurred vision, eye discharge, itching, nausea or photophobia  She has tried nothing for the symptoms  The treatment provided no relief  Review of Systems   Review of Systems   Constitutional: Negative  HENT: Negative  Eyes: Positive for pain and redness  Negative for blurred vision, photophobia, discharge, itching and visual disturbance  Respiratory: Negative  Cardiovascular: Negative  Gastrointestinal: Negative for nausea  Neurological: Negative            Current Medications       Current Outpatient Medications:     escitalopram (LEXAPRO) 20 mg tablet, TAKE 1 TABLET BY MOUTH DAILY, Disp: 30 tablet, Rfl: 5    levothyroxine 50 mcg tablet, TAKE 1 TABLET BY MOUTH DAILY, Disp: 30 tablet, Rfl: 5    lisinopril (ZESTRIL) 20 mg tablet, TAKE 1 TABLET BY MOUTH DAILY, Disp: 30 tablet, Rfl: 5    LORazepam (ATIVAN) 1 mg tablet, TAKE 1 TABLET BY MOUTH TWICE DAILY AS NEEDED FOR ANXIETY, Disp: 30 tablet, Rfl: 3    ofloxacin (OCUFLOX) 0 3 % ophthalmic solution, Administer 1 drop into the left eye 4 (four) times a day for 7 days, Disp: 5 mL, Rfl: 0    pantoprazole (PROTONIX) 40 mg tablet, TAKE 1 TABLET BY MOUTH ONCE DAILY, Disp: 30 tablet, Rfl: 5    spironolactone (ALDACTONE) 50 mg tablet, TAKE 1 TABLET BY MOUTH DAILY, Disp: 90 tablet, Rfl: 3    temazepam (RESTORIL) 15 mg capsule, TAKE 1 TO 2 CAPSULES BY MOUTH AT BEDTIME AS NEEDED FOR SLEEP, Disp: 60 capsule, Rfl: 3    Current Allergies     Allergies as of 09/25/2019 - Reviewed 09/25/2019   Allergen Reaction Noted    Iodine  01/12/2018    Medical tape  01/12/2018    Meperidine  01/12/2018    Sulfa antibiotics Hives 04/17/2019    Sulfate  01/12/2018            The following portions of the patient's history were reviewed and updated as appropriate: allergies, current medications, past family history, past medical history, past social history, past surgical history and problem list      Past Medical History:   Diagnosis Date    Anemia     Anxiety     Arthritis     Asthma     Bariatric surgery status     Depression     Disease of thyroid gland     hypothyroid    Edema     GERD (gastroesophageal reflux disease)     History of anemia     History of edema     Hypertension     Insomnia     Morbid obesity ([facility] [address] )     Postgastrectomy malabsorption     Spinal stenosis        Past Surgical History:   Procedure Laterality Date    ANKLE SURGERY Bilateral     tendon repair    CARPAL TUNNEL RELEASE Bilateral     CHOLECYSTECTOMY      COLONOSCOPY      GASTRIC BYPASS      GASTRIC BYPASS LAPAROSCOPIC N/A 11/20/2018    Procedure: LAPAROSCOPIC REVISION OF MARBELLA-EN-Y BYPASS W/ROBOTICS & INTRAOP EGD;  Surgeon: Gerhardt Ely, MD;  Location: [facility];  Service: Bariatrics    NEUROMA EXCISION      single Dewitt's neuroma    TN EGD TRANSORAL BIOPSY SINGLE/MULTIPLE N/A 8/22/2018    Procedure: ESOPHAGOGASTRODUODENOSCOPY (EGD); Surgeon: Gerhardt Ely, MD;  Location: [facility]; Service: Bariatrics    ROTATOR CUFF REPAIR      left    SHOULDER SURGERY      TONSILLECTOMY      TOTAL HIP ARTHROPLASTY Left     TOTAL KNEE ARTHROPLASTY Right        Family History   Problem Relation Age of Onset    Hypertension Mother     Other Mother         cardiac disorder    Diabetes type II Mother         without complication, with long term use of insulin    Depression Father     No Known Problems Maternal Grandmother     No Known Problems Maternal Grandfather     No Known Problems Paternal Grandmother     No Known Problems Paternal Grandfather     No Known Problems Maternal Aunt     No Known Problems Maternal Aunt     No Known Problems Maternal Aunt          Medications have been verified  Objective   /84   Pulse 55   Temp 98 °F (36 7 °C)   Resp 16   Ht 5' 7" (1 702 m)   Wt 108 kg (237 lb)   SpO2 100%   BMI 37 12 kg/m²        Physical Exam     Physical Exam   Constitutional: She is oriented to person, place, and time  She appears well-developed and well-nourished  No distress  HENT:   Head: Normocephalic and atraumatic  Right Ear: External ear normal    Left Ear: External ear normal    Nose: Nose normal    Mouth/Throat: Oropharynx is clear and moist  No oropharyngeal exudate  Eyes: Pupils are equal, round, and reactive to light  EOM and lids are normal  Right eye exhibits no chemosis, no discharge, no exudate and no hordeolum  No foreign body present in the right eye   Left eye exhibits no chemosis, no discharge, no exudate and no hordeolum  No foreign body present in the left eye  Left conjunctiva is injected  Slit lamp exam:       The right eye shows no foreign body  The left eye shows corneal abrasion and fluorescein uptake  The left eye shows no foreign body  Neck: Normal range of motion  Neck supple  Cardiovascular: Intact distal pulses  Pulmonary/Chest: Effort normal  No respiratory distress  Musculoskeletal: Normal range of motion  Neurological: She is alert and oriented to person, place, and time  Skin: Skin is warm and dry  Psychiatric: She has a normal mood and affect  Nursing note and vitals reviewed

## 2019-09-26 NOTE — PATIENT INSTRUCTIONS
Use eyedrops as directed for the next 7 days  No contacts for next 7 days  Follow up with PCP in 3-5 days  Proceed to  ER if symptoms worsen  Corneal Abrasion   AMBULATORY CARE:   A corneal abrasion  is a scratch on the cornea of your eye  The cornea is the clear layer that covers the front of your eye  A small scratch may heal in 1 to 2 days  Deeper or larger scratches may take longer to heal         Common signs and symptoms include the following:   · Pain    · More tears than usual    · Redness    · A feeling that you have something in your eye    · Blurred vision    · Sensitivity to bright light    · Headache  Contact your healthcare provider if:   · Your eye pain or vision gets worse  · You have yellow or green drainage from your eye  · You have questions or concerns about your condition or care  Treatment for a corneal abrasion  may include antibiotic eyedrops or ointment to help prevent an eye infection  You may also be given eye drops to decrease pain  Do not rub your eyes  Do not wear contact lenses until your healthcare provider tells you that it is okay  Wear sunglasses in bright light until your eyes feel better  Prevent corneal abrasions:   · Remove your contact lenses if your eyes feel dry or irritated  · Wash your hands if you need to touch your eyes or your face  · Trim your child's fingernails so he cannot scratch his eye  · Wear protective eyewear when you work with chemicals, wood, dust, or metal      · Wear protective eyewear when you play sports  · Do not wear your contacts for longer than you should  · Do not wear colored lenses or lenses with shapes on them  These lenses may cause eye damage and vision loss  · Do not wear glitter makeup  Glitter can easily get into your eyes and under contact lenses  · Do not sleep with your contacts in your eyes    © 2017 [employer] Information is for End User's use only and may not be sold, redistributed or otherwise used for commercial purposes  All illustrations and images included in CareNotes® are the copyrighted property of [employer] M [employer]  or [employer]  The above information is an  only  It is not intended as medical advice for individual conditions or treatments  Talk to your doctor, nurse or pharmacist before following any medical regimen to see if it is safe and effective for you

## 2019-10-16 DIAGNOSIS — F32.A ANXIETY AND DEPRESSION: Primary | ICD-10-CM

## 2019-10-16 DIAGNOSIS — F41.9 ANXIETY AND DEPRESSION: Primary | ICD-10-CM

## 2019-10-16 RX ORDER — BUSPIRONE HYDROCHLORIDE 7.5 MG/1
7.5 TABLET ORAL 2 TIMES DAILY
Qty: 60 TABLET | Refills: 5 | Status: SHIPPED | OUTPATIENT
Start: 2019-10-16 | End: 2020-05-15 | Stop reason: SDUPTHER

## 2019-11-02 DIAGNOSIS — F51.01 PRIMARY INSOMNIA: ICD-10-CM

## 2019-11-03 RX ORDER — TEMAZEPAM 15 MG/1
CAPSULE ORAL
Qty: 60 CAPSULE | Refills: 3 | Status: SHIPPED | OUTPATIENT
Start: 2019-11-03 | End: 2020-03-03

## 2019-11-18 DIAGNOSIS — K21.9 GASTROESOPHAGEAL REFLUX DISEASE WITHOUT ESOPHAGITIS: ICD-10-CM

## 2019-11-18 RX ORDER — PANTOPRAZOLE SODIUM 40 MG/1
TABLET, DELAYED RELEASE ORAL
Qty: 30 TABLET | Refills: 5 | Status: SHIPPED | OUTPATIENT
Start: 2019-11-18 | End: 2020-05-15 | Stop reason: SDUPTHER

## 2019-12-22 DIAGNOSIS — F41.9 ANXIETY: ICD-10-CM

## 2019-12-23 RX ORDER — LORAZEPAM 1 MG/1
TABLET ORAL
Qty: 30 TABLET | Refills: 1 | Status: SHIPPED | OUTPATIENT
Start: 2019-12-23 | End: 2020-03-04

## 2020-02-19 DIAGNOSIS — E03.8 HYPOTHYROIDISM DUE TO HASHIMOTO'S THYROIDITIS: ICD-10-CM

## 2020-02-19 DIAGNOSIS — E06.3 HYPOTHYROIDISM DUE TO HASHIMOTO'S THYROIDITIS: ICD-10-CM

## 2020-02-19 RX ORDER — LEVOTHYROXINE SODIUM 0.05 MG/1
TABLET ORAL
Qty: 30 TABLET | Refills: 5 | Status: SHIPPED | OUTPATIENT
Start: 2020-02-19 | End: 2020-03-04 | Stop reason: SDUPTHER

## 2020-02-25 DIAGNOSIS — F41.9 ANXIETY: ICD-10-CM

## 2020-03-02 DIAGNOSIS — F51.01 PRIMARY INSOMNIA: ICD-10-CM

## 2020-03-03 RX ORDER — TEMAZEPAM 15 MG/1
CAPSULE ORAL
Qty: 60 CAPSULE | Refills: 3 | Status: SHIPPED | OUTPATIENT
Start: 2020-03-03 | End: 2020-05-15 | Stop reason: SDUPTHER

## 2020-03-04 DIAGNOSIS — F41.9 ANXIETY: ICD-10-CM

## 2020-03-04 DIAGNOSIS — E03.8 HYPOTHYROIDISM DUE TO HASHIMOTO'S THYROIDITIS: ICD-10-CM

## 2020-03-04 DIAGNOSIS — E06.3 HYPOTHYROIDISM DUE TO HASHIMOTO'S THYROIDITIS: ICD-10-CM

## 2020-03-04 RX ORDER — LORAZEPAM 1 MG/1
1 TABLET ORAL 2 TIMES DAILY PRN
Qty: 30 TABLET | Refills: 1 | Status: SHIPPED | OUTPATIENT
Start: 2020-03-04 | End: 2020-05-04

## 2020-03-04 RX ORDER — LEVOTHYROXINE SODIUM 0.05 MG/1
50 TABLET ORAL DAILY
Qty: 30 TABLET | Refills: 5 | Status: SHIPPED | OUTPATIENT
Start: 2020-03-04 | End: 2020-05-15 | Stop reason: SDUPTHER

## 2020-03-04 RX ORDER — LORAZEPAM 1 MG/1
TABLET ORAL
Qty: 30 TABLET | Refills: 1 | Status: SHIPPED | OUTPATIENT
Start: 2020-03-04 | End: 2020-05-15

## 2020-03-04 NOTE — TELEPHONE ENCOUNTER
Medication: Lorazepam 1 MG oral tablet   PDMP  01/27/2020  1  12/23/2019  LORAZEPAM 1 MG TABLET  30 0  15  DO MEN  [id number]  [facility] ([id number])  1   Comm Ins  [place]       Active agreement on file -No

## 2020-03-28 DIAGNOSIS — I10 ESSENTIAL HYPERTENSION: ICD-10-CM

## 2020-03-28 DIAGNOSIS — F41.9 ANXIETY: ICD-10-CM

## 2020-03-30 RX ORDER — LISINOPRIL 20 MG/1
TABLET ORAL
Qty: 30 TABLET | Refills: 5 | Status: SHIPPED | OUTPATIENT
Start: 2020-03-30 | End: 2020-05-15 | Stop reason: SDUPTHER

## 2020-03-30 RX ORDER — ESCITALOPRAM OXALATE 20 MG/1
TABLET ORAL
Qty: 30 TABLET | Refills: 0 | Status: SHIPPED | OUTPATIENT
Start: 2020-03-30 | End: 2020-05-05

## 2020-04-03 ENCOUNTER — TELEPHONE (OUTPATIENT)
Dept: BARIATRICS | Facility: CLINIC | Age: 55
End: 2020-04-03

## 2020-04-11 ENCOUNTER — HOSPITAL ENCOUNTER (EMERGENCY)
Facility: HOSPITAL | Age: 55
Discharge: HOME | End: 2020-04-11
Attending: EMERGENCY MEDICINE | Admitting: EMERGENCY MEDICINE
Payer: COMMERCIAL

## 2020-04-11 ENCOUNTER — OFFICE VISIT (OUTPATIENT)
Dept: URGENT CARE | Facility: MEDICAL CENTER | Age: 55
End: 2020-04-11
Payer: COMMERCIAL

## 2020-04-11 VITALS
RESPIRATION RATE: 18 BRPM | TEMPERATURE: 96.7 F | HEIGHT: 68 IN | SYSTOLIC BLOOD PRESSURE: 140 MMHG | OXYGEN SATURATION: 99 % | HEART RATE: 77 BPM | WEIGHT: 235 LBS | BODY MASS INDEX: 35.61 KG/M2 | DIASTOLIC BLOOD PRESSURE: 67 MMHG

## 2020-04-11 VITALS
WEIGHT: 235 LBS | HEIGHT: 68 IN | DIASTOLIC BLOOD PRESSURE: 81 MMHG | RESPIRATION RATE: 20 BRPM | BODY MASS INDEX: 35.61 KG/M2 | SYSTOLIC BLOOD PRESSURE: 146 MMHG | OXYGEN SATURATION: 100 % | HEART RATE: 74 BPM | TEMPERATURE: 98.2 F

## 2020-04-11 DIAGNOSIS — K04.7 DENTAL ABSCESS: Primary | ICD-10-CM

## 2020-04-11 DIAGNOSIS — K04.7 DENTAL INFECTION: Primary | ICD-10-CM

## 2020-04-11 PROCEDURE — G0382 LEV 3 HOSP TYPE B ED VISIT: HCPCS | Performed by: PHYSICIAN ASSISTANT

## 2020-04-11 PROCEDURE — 99282 EMERGENCY DEPT VISIT SF MDM: CPT

## 2020-04-11 PROCEDURE — 99284 EMERGENCY DEPT VISIT MOD MDM: CPT | Performed by: PHYSICIAN ASSISTANT

## 2020-04-11 RX ORDER — METRONIDAZOLE 500 MG/1
500 TABLET ORAL 3 TIMES DAILY
Qty: 21 TABLET | Refills: 0 | Status: SHIPPED | OUTPATIENT
Start: 2020-04-11 | End: 2020-04-18

## 2020-04-11 RX ORDER — AZITHROMYCIN 250 MG/1
TABLET, FILM COATED ORAL
Qty: 6 TABLET | Refills: 0 | Status: SHIPPED | OUTPATIENT
Start: 2020-04-11 | End: 2020-04-15

## 2020-04-11 RX ORDER — CLINDAMYCIN HYDROCHLORIDE 300 MG/1
300 CAPSULE ORAL EVERY 8 HOURS
COMMUNITY
Start: 2020-04-05 | End: 2020-04-11

## 2020-04-11 RX ORDER — HYDROCODONE BITARTRATE AND ACETAMINOPHEN 5; 325 MG/1; MG/1
TABLET ORAL
COMMUNITY
Start: 2020-04-07 | End: 2020-05-15

## 2020-05-04 DIAGNOSIS — F41.9 ANXIETY: ICD-10-CM

## 2020-05-04 RX ORDER — LORAZEPAM 1 MG/1
TABLET ORAL
Qty: 30 TABLET | Refills: 0 | Status: SHIPPED | OUTPATIENT
Start: 2020-05-04 | End: 2020-05-15 | Stop reason: SDUPTHER

## 2020-05-05 DIAGNOSIS — F41.9 ANXIETY: ICD-10-CM

## 2020-05-05 RX ORDER — ESCITALOPRAM OXALATE 20 MG/1
TABLET ORAL
Qty: 30 TABLET | Refills: 2 | Status: SHIPPED | OUTPATIENT
Start: 2020-05-05 | End: 2020-05-15 | Stop reason: SDUPTHER

## 2020-05-12 ENCOUNTER — TELEPHONE (OUTPATIENT)
Dept: ADMINISTRATIVE | Facility: OTHER | Age: 55
End: 2020-05-12

## 2020-05-15 ENCOUNTER — TELEMEDICINE (OUTPATIENT)
Dept: FAMILY MEDICINE CLINIC | Facility: CLINIC | Age: 55
End: 2020-05-15
Payer: COMMERCIAL

## 2020-05-15 VITALS — WEIGHT: 235 LBS | OXYGEN SATURATION: 98 % | BODY MASS INDEX: 35.73 KG/M2

## 2020-05-15 DIAGNOSIS — I10 HYPERTENSION, UNSPECIFIED TYPE: ICD-10-CM

## 2020-05-15 DIAGNOSIS — E03.8 HYPOTHYROIDISM DUE TO HASHIMOTO'S THYROIDITIS: ICD-10-CM

## 2020-05-15 DIAGNOSIS — F32.A ANXIETY AND DEPRESSION: ICD-10-CM

## 2020-05-15 DIAGNOSIS — K91.2 POSTGASTRECTOMY MALABSORPTION: ICD-10-CM

## 2020-05-15 DIAGNOSIS — I10 BENIGN HYPERTENSION: ICD-10-CM

## 2020-05-15 DIAGNOSIS — I10 ESSENTIAL HYPERTENSION: ICD-10-CM

## 2020-05-15 DIAGNOSIS — E06.3 HYPOTHYROIDISM DUE TO HASHIMOTO'S THYROIDITIS: ICD-10-CM

## 2020-05-15 DIAGNOSIS — E06.3 HASHIMOTO'S DISEASE: Primary | ICD-10-CM

## 2020-05-15 DIAGNOSIS — F41.9 ANXIETY: ICD-10-CM

## 2020-05-15 DIAGNOSIS — F41.9 ANXIETY AND DEPRESSION: ICD-10-CM

## 2020-05-15 DIAGNOSIS — Z90.3 POSTGASTRECTOMY MALABSORPTION: ICD-10-CM

## 2020-05-15 DIAGNOSIS — F51.01 PRIMARY INSOMNIA: ICD-10-CM

## 2020-05-15 DIAGNOSIS — K21.9 GASTROESOPHAGEAL REFLUX DISEASE WITHOUT ESOPHAGITIS: ICD-10-CM

## 2020-05-15 PROBLEM — Z98.84 BARIATRIC SURGERY STATUS: Status: RESOLVED | Noted: 2018-07-06 | Resolved: 2020-05-15

## 2020-05-15 PROCEDURE — 99214 OFFICE O/P EST MOD 30 MIN: CPT | Performed by: NURSE PRACTITIONER

## 2020-05-15 RX ORDER — ESCITALOPRAM OXALATE 20 MG/1
20 TABLET ORAL DAILY
Qty: 30 TABLET | Refills: 5 | Status: SHIPPED | OUTPATIENT
Start: 2020-05-15 | End: 2020-08-03

## 2020-05-15 RX ORDER — LISINOPRIL 20 MG/1
20 TABLET ORAL DAILY
Qty: 30 TABLET | Refills: 5 | Status: SHIPPED | OUTPATIENT
Start: 2020-05-15 | End: 2020-12-08 | Stop reason: SDUPTHER

## 2020-05-15 RX ORDER — PANTOPRAZOLE SODIUM 40 MG/1
40 TABLET, DELAYED RELEASE ORAL DAILY
Qty: 30 TABLET | Refills: 5 | Status: SHIPPED | OUTPATIENT
Start: 2020-05-15 | End: 2021-02-03

## 2020-05-15 RX ORDER — LEVOTHYROXINE SODIUM 0.05 MG/1
50 TABLET ORAL DAILY
Qty: 30 TABLET | Refills: 5 | Status: SHIPPED | OUTPATIENT
Start: 2020-05-15 | End: 2021-05-18

## 2020-05-15 RX ORDER — BUSPIRONE HYDROCHLORIDE 7.5 MG/1
7.5 TABLET ORAL 2 TIMES DAILY
Qty: 60 TABLET | Refills: 5 | Status: SHIPPED | OUTPATIENT
Start: 2020-05-15 | End: 2021-02-08

## 2020-05-15 RX ORDER — SPIRONOLACTONE 50 MG/1
50 TABLET, FILM COATED ORAL DAILY
Qty: 30 TABLET | Refills: 5 | Status: SHIPPED | OUTPATIENT
Start: 2020-05-15 | End: 2020-12-08

## 2020-05-15 RX ORDER — TEMAZEPAM 15 MG/1
15 CAPSULE ORAL
Qty: 60 CAPSULE | Refills: 3 | Status: SHIPPED | OUTPATIENT
Start: 2020-05-15 | End: 2020-06-22

## 2020-05-15 RX ORDER — LORAZEPAM 1 MG/1
1 TABLET ORAL 2 TIMES DAILY PRN
Qty: 30 TABLET | Refills: 1 | Status: SHIPPED | OUTPATIENT
Start: 2020-05-15 | End: 2020-06-29

## 2020-05-20 DIAGNOSIS — N62 LARGE BREASTS: Primary | ICD-10-CM

## 2020-05-20 DIAGNOSIS — M54.2 NECK PAIN: ICD-10-CM

## 2020-06-22 DIAGNOSIS — F51.01 PRIMARY INSOMNIA: ICD-10-CM

## 2020-06-22 RX ORDER — TEMAZEPAM 15 MG/1
30 CAPSULE ORAL
Qty: 60 CAPSULE | Refills: 5 | Status: SHIPPED | OUTPATIENT
Start: 2020-06-22 | End: 2020-12-15

## 2020-06-22 RX ORDER — TEMAZEPAM 15 MG/1
CAPSULE ORAL
Qty: 60 CAPSULE | Refills: 5 | Status: SHIPPED | OUTPATIENT
Start: 2020-06-22 | End: 2020-06-22 | Stop reason: SDUPTHER

## 2020-06-22 NOTE — TELEPHONE ENCOUNTER
Medication:  PDMP   Active agreement on file -No      05/23/2020  1  03/03/2020  TEMAZEPAM 15 MG CAPSULE  60 0  30  DO MEN

## 2020-06-28 DIAGNOSIS — F41.9 ANXIETY: ICD-10-CM

## 2020-06-29 RX ORDER — LORAZEPAM 1 MG/1
TABLET ORAL
Qty: 30 TABLET | Refills: 0 | Status: SHIPPED | OUTPATIENT
Start: 2020-06-29 | End: 2020-07-17

## 2020-07-16 DIAGNOSIS — F41.9 ANXIETY: ICD-10-CM

## 2020-07-17 RX ORDER — LORAZEPAM 1 MG/1
TABLET ORAL
Qty: 30 TABLET | Refills: 3 | Status: SHIPPED | OUTPATIENT
Start: 2020-07-17 | End: 2020-10-14

## 2020-07-17 NOTE — TELEPHONE ENCOUNTER
Medication:Lorazepma  PDMP   06/29/2020  1  06/29/2020  LORAZEPAM 1 MG TABLET  30 0  15  DO MEN   Active agreement on file -No

## 2020-08-03 DIAGNOSIS — F41.9 ANXIETY: ICD-10-CM

## 2020-08-03 RX ORDER — ESCITALOPRAM OXALATE 20 MG/1
TABLET ORAL
Qty: 30 TABLET | Refills: 3 | Status: SHIPPED | OUTPATIENT
Start: 2020-08-03 | End: 2020-12-06 | Stop reason: SDUPTHER

## 2020-10-14 DIAGNOSIS — F41.9 ANXIETY: ICD-10-CM

## 2020-10-14 RX ORDER — LORAZEPAM 1 MG/1
TABLET ORAL
Qty: 30 TABLET | Refills: 3 | Status: SHIPPED | OUTPATIENT
Start: 2020-10-14 | End: 2021-01-06

## 2020-12-06 DIAGNOSIS — F41.9 ANXIETY: ICD-10-CM

## 2020-12-07 NOTE — TELEPHONE ENCOUNTER
Requested medication(s) are due for refill today: Yes  Patient has already received a courtesy refill: No  Other reason request has been forwarded to provider: per protocol

## 2020-12-08 DIAGNOSIS — I10 ESSENTIAL HYPERTENSION: ICD-10-CM

## 2020-12-08 RX ORDER — LISINOPRIL 20 MG/1
20 TABLET ORAL 2 TIMES DAILY
Qty: 60 TABLET | Refills: 5 | Status: SHIPPED | OUTPATIENT
Start: 2020-12-08 | End: 2021-07-06

## 2020-12-08 RX ORDER — ESCITALOPRAM OXALATE 20 MG/1
20 TABLET ORAL DAILY
Qty: 30 TABLET | Refills: 5 | Status: SHIPPED | OUTPATIENT
Start: 2020-12-08 | End: 2021-07-12

## 2020-12-15 DIAGNOSIS — F51.01 PRIMARY INSOMNIA: ICD-10-CM

## 2020-12-15 RX ORDER — TEMAZEPAM 15 MG/1
CAPSULE ORAL
Qty: 60 CAPSULE | Refills: 0 | Status: SHIPPED | OUTPATIENT
Start: 2020-12-15 | End: 2021-01-11

## 2021-01-06 DIAGNOSIS — F41.9 ANXIETY: ICD-10-CM

## 2021-01-06 RX ORDER — LORAZEPAM 1 MG/1
TABLET ORAL
Qty: 30 TABLET | Refills: 3 | Status: SHIPPED | OUTPATIENT
Start: 2021-01-06 | End: 2021-04-14

## 2021-01-06 NOTE — TELEPHONE ENCOUNTER
Medication:  PDMP   Active agreement on file -No      11/02/2020  1  10/14/2020  LORAZEPAM 1 MG TABLET  30 0  15  DO MEN      Left voice msg for pt that this will be last refill b/c pt has not been seen since 5/2020

## 2021-01-11 DIAGNOSIS — F51.01 PRIMARY INSOMNIA: ICD-10-CM

## 2021-01-11 RX ORDER — TEMAZEPAM 15 MG/1
CAPSULE ORAL
Qty: 60 CAPSULE | Refills: 3 | Status: SHIPPED | OUTPATIENT
Start: 2021-01-11 | End: 2021-05-12

## 2021-01-11 NOTE — TELEPHONE ENCOUNTER
Medication:  PDMP   Active agreement on file -No      12/15/2020  1  12/15/2020  TEMAZEPAM 15 MG CAPSULE  60 0  30  DO MEN

## 2021-01-25 DIAGNOSIS — L70.0 CYSTIC ACNE: Primary | ICD-10-CM

## 2021-01-25 RX ORDER — SPIRONOLACTONE 50 MG/1
50 TABLET, FILM COATED ORAL DAILY
Qty: 90 TABLET | Refills: 3 | Status: SHIPPED | OUTPATIENT
Start: 2021-01-25 | End: 2021-07-30 | Stop reason: SDUPTHER

## 2021-02-02 DIAGNOSIS — K21.9 GASTROESOPHAGEAL REFLUX DISEASE WITHOUT ESOPHAGITIS: ICD-10-CM

## 2021-02-03 RX ORDER — PANTOPRAZOLE SODIUM 40 MG/1
TABLET, DELAYED RELEASE ORAL
Qty: 30 TABLET | Refills: 0 | Status: SHIPPED | OUTPATIENT
Start: 2021-02-03 | End: 2021-03-08

## 2021-02-08 DIAGNOSIS — F41.9 ANXIETY AND DEPRESSION: ICD-10-CM

## 2021-02-08 DIAGNOSIS — F32.A ANXIETY AND DEPRESSION: ICD-10-CM

## 2021-02-08 RX ORDER — BUSPIRONE HYDROCHLORIDE 7.5 MG/1
TABLET ORAL
Qty: 60 TABLET | Refills: 5 | Status: SHIPPED | OUTPATIENT
Start: 2021-02-08 | End: 2021-07-30 | Stop reason: SDUPTHER

## 2021-02-09 DIAGNOSIS — Z23 ENCOUNTER FOR IMMUNIZATION: ICD-10-CM

## 2021-03-08 DIAGNOSIS — K21.9 GASTROESOPHAGEAL REFLUX DISEASE WITHOUT ESOPHAGITIS: ICD-10-CM

## 2021-03-08 RX ORDER — PANTOPRAZOLE SODIUM 40 MG/1
TABLET, DELAYED RELEASE ORAL
Qty: 30 TABLET | Refills: 2 | Status: SHIPPED | OUTPATIENT
Start: 2021-03-08 | End: 2021-06-18

## 2021-03-10 ENCOUNTER — IMMUNIZATIONS (OUTPATIENT)
Dept: FAMILY MEDICINE CLINIC | Facility: HOSPITAL | Age: 56
End: 2021-03-10

## 2021-03-10 DIAGNOSIS — Z23 ENCOUNTER FOR IMMUNIZATION: Primary | ICD-10-CM

## 2021-03-10 PROCEDURE — 91300 SARS-COV-2 / COVID-19 MRNA VACCINE (PFIZER-BIONTECH) 30 MCG: CPT

## 2021-03-10 PROCEDURE — 0001A SARS-COV-2 / COVID-19 MRNA VACCINE (PFIZER-BIONTECH) 30 MCG: CPT

## 2021-04-01 ENCOUNTER — IMMUNIZATIONS (OUTPATIENT)
Dept: FAMILY MEDICINE CLINIC | Facility: HOSPITAL | Age: 56
End: 2021-04-01

## 2021-04-01 DIAGNOSIS — Z23 ENCOUNTER FOR IMMUNIZATION: Primary | ICD-10-CM

## 2021-04-01 PROCEDURE — 91300 SARS-COV-2 / COVID-19 MRNA VACCINE (PFIZER-BIONTECH) 30 MCG: CPT

## 2021-04-01 PROCEDURE — 0002A SARS-COV-2 / COVID-19 MRNA VACCINE (PFIZER-BIONTECH) 30 MCG: CPT

## 2021-04-14 DIAGNOSIS — F41.9 ANXIETY: ICD-10-CM

## 2021-04-14 RX ORDER — LORAZEPAM 1 MG/1
TABLET ORAL
Qty: 30 TABLET | Refills: 3 | Status: SHIPPED | OUTPATIENT
Start: 2021-04-14 | End: 2021-07-30 | Stop reason: SDUPTHER

## 2021-04-14 NOTE — TELEPHONE ENCOUNTER
Medication: Lorazepam 1 mg  PDMP 03/15/2021 1 01/06/2021   LORAZEPAM 1 MG TABLET  30 0 15 DO MEN  Active agreement on file -No

## 2021-04-23 ENCOUNTER — OFFICE VISIT (OUTPATIENT)
Dept: FAMILY MEDICINE CLINIC | Facility: CLINIC | Age: 56
End: 2021-04-23
Payer: COMMERCIAL

## 2021-04-23 VITALS
RESPIRATION RATE: 18 BRPM | OXYGEN SATURATION: 97 % | DIASTOLIC BLOOD PRESSURE: 82 MMHG | TEMPERATURE: 98.5 F | HEART RATE: 72 BPM | HEIGHT: 68 IN | SYSTOLIC BLOOD PRESSURE: 126 MMHG | WEIGHT: 235 LBS | BODY MASS INDEX: 35.61 KG/M2

## 2021-04-23 DIAGNOSIS — Z00.00 ANNUAL PHYSICAL EXAM: Primary | ICD-10-CM

## 2021-04-23 DIAGNOSIS — E66.01 CLASS 2 SEVERE OBESITY DUE TO EXCESS CALORIES WITH SERIOUS COMORBIDITY AND BODY MASS INDEX (BMI) OF 35.0 TO 35.9 IN ADULT (HCC): ICD-10-CM

## 2021-04-23 DIAGNOSIS — Z23 NEED FOR TDAP VACCINATION: ICD-10-CM

## 2021-04-23 PROBLEM — I10 BENIGN HYPERTENSION: Status: RESOLVED | Noted: 2018-01-12 | Resolved: 2021-04-23

## 2021-04-23 LAB
25(OH)D3 SERPL-MCNC: 32 NG/ML (ref 30–100)
ALBUMIN SERPL-MCNC: 4.1 G/DL (ref 3.6–5.1)
ALBUMIN/GLOB SERPL: 1.6 (CALC) (ref 1–2.5)
ALP SERPL-CCNC: 91 U/L (ref 37–153)
ALT SERPL-CCNC: 17 U/L (ref 6–29)
AST SERPL-CCNC: 21 U/L (ref 10–35)
BASOPHILS # BLD AUTO: 22 CELLS/UL (ref 0–200)
BASOPHILS NFR BLD AUTO: 0.4 %
BILIRUB SERPL-MCNC: 0.8 MG/DL (ref 0.2–1.2)
BUN SERPL-MCNC: 15 MG/DL (ref 7–25)
BUN/CREAT SERPL: ABNORMAL (CALC) (ref 6–22)
CALCIUM SERPL-MCNC: 9.1 MG/DL (ref 8.6–10.4)
CALCIUM SERPL-MCNC: 9.1 MG/DL (ref 8.6–10.4)
CHLORIDE SERPL-SCNC: 105 MMOL/L (ref 98–110)
CHOLEST SERPL-MCNC: 155 MG/DL
CHOLEST/HDLC SERPL: 2 (CALC)
CO2 SERPL-SCNC: 26 MMOL/L (ref 20–32)
CREAT SERPL-MCNC: 0.79 MG/DL (ref 0.5–1.05)
EOSINOPHIL # BLD AUTO: 130 CELLS/UL (ref 15–500)
EOSINOPHIL NFR BLD AUTO: 2.4 %
ERYTHROCYTE [DISTWIDTH] IN BLOOD BY AUTOMATED COUNT: 12.3 % (ref 11–15)
GLOBULIN SER CALC-MCNC: 2.6 G/DL (CALC) (ref 1.9–3.7)
GLUCOSE SERPL-MCNC: 109 MG/DL (ref 65–99)
HCT VFR BLD AUTO: 39.6 % (ref 35–45)
HDLC SERPL-MCNC: 77 MG/DL
HGB BLD-MCNC: 12.9 G/DL (ref 11.7–15.5)
LDLC SERPL CALC-MCNC: 62 MG/DL (CALC)
LYMPHOCYTES # BLD AUTO: 1436 CELLS/UL (ref 850–3900)
LYMPHOCYTES NFR BLD AUTO: 26.6 %
MCH RBC QN AUTO: 32.5 PG (ref 27–33)
MCHC RBC AUTO-ENTMCNC: 32.6 G/DL (ref 32–36)
MCV RBC AUTO: 99.7 FL (ref 80–100)
MONOCYTES # BLD AUTO: 432 CELLS/UL (ref 200–950)
MONOCYTES NFR BLD AUTO: 8 %
NEUTROPHILS # BLD AUTO: 3380 CELLS/UL (ref 1500–7800)
NEUTROPHILS NFR BLD AUTO: 62.6 %
NONHDLC SERPL-MCNC: 78 MG/DL (CALC)
PLATELET # BLD AUTO: 253 THOUSAND/UL (ref 140–400)
PMV BLD REES-ECKER: 10.3 FL (ref 7.5–12.5)
POTASSIUM SERPL-SCNC: 4.6 MMOL/L (ref 3.5–5.3)
PROT SERPL-MCNC: 6.7 G/DL (ref 6.1–8.1)
PTH-INTACT SERPL-MCNC: 71 PG/ML (ref 14–64)
RBC # BLD AUTO: 3.97 MILLION/UL (ref 3.8–5.1)
SL AMB EGFR AFRICAN AMERICAN: 98 ML/MIN/1.73M2
SL AMB EGFR NON AFRICAN AMERICAN: 84 ML/MIN/1.73M2
SODIUM SERPL-SCNC: 140 MMOL/L (ref 135–146)
TRIGL SERPL-MCNC: 75 MG/DL
TSH SERPL-ACNC: 3.13 MIU/L
VIT B1 BLD-SCNC: 176 NMOL/L (ref 78–185)
WBC # BLD AUTO: 5.4 THOUSAND/UL (ref 3.8–10.8)

## 2021-04-23 PROCEDURE — 90471 IMMUNIZATION ADMIN: CPT

## 2021-04-23 PROCEDURE — 99396 PREV VISIT EST AGE 40-64: CPT | Performed by: NURSE PRACTITIONER

## 2021-04-23 PROCEDURE — 90715 TDAP VACCINE 7 YRS/> IM: CPT

## 2021-04-23 RX ORDER — PHENTERMINE HYDROCHLORIDE 37.5 MG/1
37.5 TABLET ORAL DAILY
Qty: 30 TABLET | Refills: 3 | Status: SHIPPED | OUTPATIENT
Start: 2021-04-23 | End: 2021-07-30 | Stop reason: SDUPTHER

## 2021-04-23 NOTE — PROGRESS NOTES
[address]    NAME: Rudy Coon  AGE: 54 y o  SEX: female  : 1965     DATE: 2021     Assessment and Plan:     Problem List Items Addressed This Visit     None      Visit Diagnoses     Annual physical exam    -  Primary    Class 2 severe obesity due to excess calories with serious comorbidity and body mass index (BMI) of 35 0 to 35 9 in adult [facility])        Relevant Medications    phentermine (ADIPEX-P) 37 5 MG tablet    Need for Tdap vaccination        Relevant Orders    TDAP VACCINE GREATER THAN OR EQUAL TO 8YO IM (Completed)          Immunizations and preventive care screenings were discussed with patient today  Appropriate education was printed on patient's after visit summary  Counseling:  Alcohol/drug use: discussed moderation in alcohol intake, the recommendations for healthy alcohol use, and avoidance of illicit drug use  Dental Health: discussed importance of regular tooth brushing, flossing, and dental visits  Injury prevention: discussed safety/seat belts, safety helmets, smoke detectors, carbon dioxide detectors, and smoking near bedding or upholstery  Sexual health: discussed sexually transmitted diseases, partner selection, use of condoms, avoidance of unintended pregnancy, and contraceptive alternatives  · Exercise: the importance of regular exercise/physical activity was discussed  Recommend exercise 3-5 times per week for at least 30 minutes  BMI Counseling: Body mass index is 35 73 kg/m²  The BMI is above normal  Nutrition recommendations include decreasing portion sizes, encouraging healthy choices of fruits and vegetables, decreasing fast food intake, consuming healthier snacks, limiting drinks that contain sugar, moderation in carbohydrate intake, increasing intake of lean protein, reducing intake of saturated and trans fat and reducing intake of cholesterol   Exercise recommendations include moderate physical activity 150 minutes/week, exercising 3-5 times per week and strength training exercises  No pharmacotherapy was ordered  Return in about 4 weeks (around 2021)  Chief Complaint:     Chief Complaint   Patient presents with    Annual Exam      History of Present Illness:     Adult Annual Physical   Patient here for a comprehensive physical exam  The patient reports no problems  Diet and Physical Activity  · Diet/Nutrition: well balanced diet  · Exercise: 1-2 times a week on average  Depression Screening  PHQ-9 Depression Screening    PHQ-9:   Frequency of the following problems over the past two weeks:      Little interest or pleasure in doing things: 0 - not at all  Feeling down, depressed, or hopeless: 0 - not at all  Trouble falling or staying asleep, or sleeping too much: 0 - not at all  Feeling tired or having little energy: 0 - not at all  Poor appetite or overeatin - not at all  Feeling bad about yourself - or that you are a failure or have let yourself or your family down: 0 - not at all  Trouble concentrating on things, such as reading the newspaper or watching television: 0 - not at all  Moving or speaking so slowly that other people could have noticed  Or the opposite - being so fidgety or restless that you have been moving around a lot more than usual: 0 - not at all  Thoughts that you would be better off dead, or of hurting yourself in some way: 0 - not at all  PHQ-2 Score: 0  PHQ-9 Score: 0       General Health  · Sleep: sleeps well  · Hearing: normal - bilateral   · Vision: no vision problems  · Dental: regular dental visits  /GYN Health  · Patient is: postmenopausal  ·      Review of Systems:     Review of Systems   Constitutional: Negative for fatigue and fever  HENT: Negative for congestion and postnasal drip  Eyes: Negative for photophobia and visual disturbance  Respiratory: Negative for cough and shortness of breath  Cardiovascular: Negative for chest pain and palpitations  Gastrointestinal: Negative for constipation and diarrhea  Genitourinary: Negative for dysuria and frequency  Musculoskeletal: Negative for arthralgias and myalgias  Skin: Negative for rash  Neurological: Negative for dizziness, light-headedness and headaches  Hematological: Negative for adenopathy  Psychiatric/Behavioral: Negative for dysphoric mood and sleep disturbance  The patient is not nervous/anxious  Past Medical History:     Past Medical History:   Diagnosis Date    Anemia     Anxiety     Arthritis     Asthma     Bariatric surgery status     Depression     Disease of thyroid gland     hypothyroid    Edema     GERD (gastroesophageal reflux disease)     History of anemia     History of edema     Hypertension     Insomnia     Morbid obesity (HCC)     Postgastrectomy malabsorption     Spinal stenosis       Past Surgical History:     Past Surgical History:   Procedure Laterality Date    ANKLE SURGERY Bilateral     tendon repair    CARPAL TUNNEL RELEASE Bilateral     CHOLECYSTECTOMY      COLONOSCOPY      GASTRIC BYPASS      GASTRIC BYPASS LAPAROSCOPIC N/A 11/20/2018    Procedure: LAPAROSCOPIC REVISION OF MARBELLA-EN-Y BYPASS W/ROBOTICS & INTRAOP EGD;  Surgeon: Stacia Baker MD;  Location: [facility];  Service: Bariatrics    NEUROMA EXCISION      single Dewitt's neuroma    LA EGD TRANSORAL BIOPSY SINGLE/MULTIPLE N/A 8/22/2018    Procedure: ESOPHAGOGASTRODUODENOSCOPY (EGD); Surgeon: Stacia Baker MD;  Location: [facility];   Service: Bariatrics    ROTATOR CUFF REPAIR      left    SHOULDER SURGERY      TONSILLECTOMY      TOTAL HIP ARTHROPLASTY Left     TOTAL KNEE ARTHROPLASTY Right       Social History:        Social History     Socioeconomic History    Marital status: /Civil Union     Spouse name: None    Number of children: None    Years of education: None    Highest education level: None Occupational History    None   Social Needs    Financial resource strain: None    Food insecurity     Worry: None     Inability: None    Transportation needs     Medical: None     Non-medical: None   Tobacco Use    Smoking status: Never Smoker    Smokeless tobacco: Never Used   Substance and Sexual Activity    Alcohol use: Yes     Frequency: 4 or more times a week     Drinks per session: 1 or 2     Comment: social     Drug use: No    Sexual activity: Not Currently     Partners: Male   Lifestyle    Physical activity     Days per week: None     Minutes per session: None    Stress: None   Relationships    Social connections     Talks on phone: None     Gets together: None     Attends [employer] witness service: None     Active member of club or organization: None     Attends meetings of clubs or organizations: None     Relationship status: None    Intimate partner violence     Fear of current or ex partner: None     Emotionally abused: None     Physically abused: None     Forced sexual activity: None   Other Topics Concern    None   Social History Narrative    None      Family History:     Family History   Problem Relation Age of Onset    Hypertension Mother     Other Mother         cardiac disorder    Diabetes type II Mother         without complication, with long term use of insulin    Depression Father     No Known Problems Maternal Grandmother     No Known Problems Maternal Grandfather     No Known Problems Paternal Grandmother     No Known Problems Paternal Grandfather     No Known Problems Maternal Aunt     No Known Problems Maternal Aunt     No Known Problems Maternal Aunt       Current Medications:     Current Outpatient Medications   Medication Sig Dispense Refill    busPIRone (BUSPAR) 7 5 mg tablet Take 1 tablet by mouth twice daily 60 tablet 5    escitalopram (LEXAPRO) 20 mg tablet Take 1 tablet (20 mg total) by mouth daily 30 tablet 5    levothyroxine 50 mcg tablet Take 1 tablet (50 mcg total) by mouth daily 30 tablet 5    lisinopril (ZESTRIL) 20 mg tablet Take 1 tablet (20 mg total) by mouth 2 (two) times a day 60 tablet 5    LORazepam (ATIVAN) 1 mg tablet Take 1 tablet by mouth twice daily as needed for anxiety 30 tablet 3    pantoprazole (PROTONIX) 40 mg tablet Take 1 tablet by mouth once daily 30 tablet 2    spironolactone (ALDACTONE) 50 mg tablet Take 1 tablet (50 mg total) by mouth daily 90 tablet 3    temazepam (RESTORIL) 15 mg capsule TAKE 2 CAPSULES BY MOUTH AT BEDTIME AS NEEDED FOR SLEEP 60 capsule 3    phentermine (ADIPEX-P) 37 5 MG tablet Take 1 tablet (37 5 mg total) by mouth daily 30 tablet 3     No current facility-administered medications for this visit  Allergies: Allergies   Allergen Reactions    Amoxicillin Confusion, Delirium, Dizziness, Hives, Itching, Lightheadedness, Other (See Comments), Photosensitivity, Syncope and Visual Disturbance    Iodine - Food Allergy      IV dye  Uncontrolled sneezing      Medical Tape     Meperidine     Sulfa Antibiotics Hives    Sulfate       Physical Exam:     /82 (BP Location: Left arm, Patient Position: Sitting, Cuff Size: Large)   Pulse 72   Temp 98 5 °F (36 9 °C) (Tympanic)   Resp 18   Ht 5' 8" (1 727 m) Comment: pt refused  Wt 107 kg (235 lb) Comment: pt refused  SpO2 97%   BMI 35 73 kg/m²     Physical Exam  Vitals signs and nursing note reviewed  Constitutional:       Appearance: Normal appearance  HENT:      Head: Normocephalic and atraumatic  Right Ear: Tympanic membrane, ear canal and external ear normal       Left Ear: Tympanic membrane, ear canal and external ear normal       Nose: Nose normal       Mouth/Throat:      Mouth: Mucous membranes are moist       Pharynx: Oropharynx is clear  Eyes:      Extraocular Movements: Extraocular movements intact  Conjunctiva/sclera: Conjunctivae normal       Pupils: Pupils are equal, round, and reactive to light     Neck:      Musculoskeletal: Normal range of motion and neck supple  Cardiovascular:      Rate and Rhythm: Normal rate and regular rhythm  Pulses: Normal pulses  Heart sounds: Normal heart sounds  Pulmonary:      Effort: Pulmonary effort is normal       Breath sounds: Normal breath sounds  Abdominal:      General: Bowel sounds are normal    Musculoskeletal: Normal range of motion  Skin:     General: Skin is warm and dry  Capillary Refill: Capillary refill takes less than 2 seconds  Neurological:      General: No focal deficit present  Mental Status: She is alert and oriented to person, place, and time  Psychiatric:         Mood and Affect: Mood normal          Behavior: Behavior normal          Thought Content:  Thought content normal          Judgment: Judgment normal           Maricarmen Jones, [address] [facility] [address]

## 2021-04-23 NOTE — PATIENT INSTRUCTIONS
Wellness Visit for Adults   AMBULATORY CARE:   A wellness visit  is when you see your healthcare provider to get screened for health problems  Your healthcare provider will also give you advice on how to stay healthy  Write down your questions so you remember to ask them  Ask your healthcare provider how often you should have a wellness visit  What happens at a wellness visit:  Your healthcare provider will ask about your health, and your family history of health problems  This includes high blood pressure, heart disease, and cancer  He or she will ask if you have symptoms that concern you, if you smoke, and about your mood  You may also be asked about your intake of medicines, supplements, food, and alcohol  Any of the following may be done:  · Your weight  will be checked  Your height may also be checked so your body mass index (BMI) can be calculated  Your BMI shows if you are at a healthy weight  · Your blood pressure  and heart rate will be checked  Your temperature may also be checked  · Blood and urine tests  may be done  Blood tests may be done to check your cholesterol levels  Abnormal cholesterol levels increase your risk for heart disease and stroke  You may also need a blood or urine test to check for diabetes if you are at increased risk  Urine tests may be done to look for signs of an infection or kidney disease  · A physical exam  includes checking your heartbeat and lungs with a stethoscope  Your healthcare provider may also check your skin to look for sun damage  · Screening tests  may be recommended  A screening test is done to check for diseases that may not cause symptoms  The screening tests you may need depend on your age, gender, family history, and lifestyle habits  For example, colorectal screening may be recommended if you are 48years old or older  Screening tests you need if you are a woman:   · A Pap smear  is used to screen for cervical cancer   Pap smears are usually done every 3 to 5 years depending on your age  You may need them more often if you have had abnormal Pap smear test results in the past  Ask your healthcare provider how often you should have a Pap smear  · A mammogram  is an x-ray of your breasts to screen for breast cancer  Experts recommend mammograms every 2 years starting at age 48 years  You may need a mammogram at age 52 years or younger if you have an increased risk for breast cancer  Talk to your healthcare provider about when you should start having mammograms and how often you need them  Vaccines you may need:   · Get an influenza vaccine  every year  The influenza vaccine protects you from the flu  Several types of viruses cause the flu  The viruses change over time, so new vaccines are made each year  · Get a tetanus-diphtheria (Td) booster vaccine  every 10 years  This vaccine protects you against tetanus and diphtheria  Tetanus is a severe infection that may cause painful muscle spasms and lockjaw  Diphtheria is a severe bacterial infection that causes a thick covering in the back of your mouth and throat  · Get a human papillomavirus (HPV) vaccine  if you are female and aged 23 to 32 or male 23 to 24 and never received it  This vaccine protects you from HPV infection  HPV is the most common infection spread by sexual contact  HPV may also cause vaginal, penile, and anal cancers  · Get a pneumococcal vaccine  if you are aged 72 years or older  The pneumococcal vaccine is an injection given to protect you from pneumococcal disease  Pneumococcal disease is an infection caused by pneumococcal bacteria  The infection may cause pneumonia, meningitis, or an ear infection  · Get a shingles vaccine  if you are 60 or older, even if you have had shingles before  The shingles vaccine is an injection to protect you from the varicella-zoster virus  This is the same virus that causes chickenpox   Shingles is a painful rash that develops in people who had chickenpox or have been exposed to the virus  How to eat healthy:  My Plate is a model for planning healthy meals  It shows the types and amounts of foods that should go on your plate  Fruits and vegetables make up about half of your plate, and grains and protein make up the other half  A serving of dairy is included on the side of your plate  The amount of calories and serving sizes you need depends on your age, gender, weight, and height  Examples of healthy foods are listed below:  · Eat a variety of vegetables  such as dark green, red, and orange vegetables  You can also include canned vegetables low in sodium (salt) and frozen vegetables without added butter or sauces  · Eat a variety of fresh fruits , canned fruit in 100% juice, frozen fruit, and dried fruit  · Include whole grains  At least half of the grains you eat should be whole grains  Examples include whole-wheat bread, wheat pasta, brown rice, and whole-grain cereals such as oatmeal     · Eat a variety of protein foods such as seafood (fish and shellfish), lean meat, and poultry without skin (turkey and chicken)  Examples of lean meats include pork leg, shoulder, or tenderloin, and beef round, sirloin, tenderloin, and extra lean ground beef  Other protein foods include eggs and egg substitutes, beans, peas, soy products, nuts, and seeds  · Choose low-fat dairy products such as skim or 1% milk or low-fat yogurt, cheese, and cottage cheese  · Limit unhealthy fats  such as butter, hard margarine, and shortening  Exercise:  Exercise at least 30 minutes per day on most days of the week  Some examples of exercise include walking, biking, dancing, and swimming  You can also fit in more physical activity by taking the stairs instead of the elevator or parking farther away from stores  Include muscle strengthening activities 2 days each week  Regular exercise provides many health benefits   It helps you manage your weight, and decreases your risk for type 2 diabetes, heart disease, stroke, and high blood pressure  Exercise can also help improve your mood  Ask your healthcare provider about the best exercise plan for you  General health and safety guidelines:   · Do not smoke  Nicotine and other chemicals in cigarettes and cigars can cause lung damage  Ask your healthcare provider for information if you currently smoke and need help to quit  E-cigarettes or smokeless tobacco still contain nicotine  Talk to your healthcare provider before you use these products  · Limit alcohol  A drink of alcohol is 12 ounces of beer, 5 ounces of wine, or 1½ ounces of liquor  · Lose weight, if needed  Being overweight increases your risk of certain health conditions  These include heart disease, high blood pressure, type 2 diabetes, and certain types of cancer  · Protect your skin  Do not sunbathe or use tanning beds  Use sunscreen with a SPF 15 or higher  Apply sunscreen at least 15 minutes before you go outside  Reapply sunscreen every 2 hours  Wear protective clothing, hats, and sunglasses when you are outside  · Drive safely  Always wear your seatbelt  Make sure everyone in your car wears a seatbelt  A seatbelt can save your life if you are in an accident  Do not use your cell phone when you are driving  This could distract you and cause an accident  Pull over if you need to make a call or send a text message  · Practice safe sex  Use latex condoms if are sexually active and have more than one partner  Your healthcare provider may recommend screening tests for sexually transmitted infections (STIs)  · Wear helmets, lifejackets, and protective gear  Always wear a helmet when you ride a bike or motorcycle, go skiing, or play sports that could cause a head injury  Wear protective equipment when you play sports  Wear a lifejacket when you are on a boat or doing water sports      © Copyright [employer] 2020 Information is for End User's use only and may not be sold, redistributed or otherwise used for commercial purposes  All illustrations and images included in CareNotes® are the copyrighted property of [employer]  or [employer]och[employer] Guzman  The above information is an  only  It is not intended as medical advice for individual conditions or treatments  Talk to your doctor, nurse or pharmacist before following any medical regimen to see if it is safe and effective for you  Weight Management   AMBULATORY CARE:   Why it is important to manage your weight:  Being overweight increases your risk of health conditions such as heart disease, high blood pressure, type 2 diabetes, and certain types of cancer  It can also increase your risk for osteoarthritis, sleep apnea, and other respiratory problems  Aim for a slow, steady weight loss  Even a small amount of weight loss can lower your risk of health problems  How to lose weight safely:  A safe and healthy way to lose weight is to eat fewer calories and get regular exercise  · You can lose up about 1 pound a week by decreasing the number of calories you eat by 500 calories each day  You can decrease calories by eating smaller portion sizes or by cutting out high-calorie foods  Read labels to find out how many calories are in the foods you eat  · You can also burn calories with exercise such as walking, swimming, or biking  You will be more likely to keep weight off if you make these changes part of your lifestyle  Exercise at least 30 minutes per day on most days of the week  You can also fit in more physical activity by taking the stairs instead of the elevator or parking farther away from stores  Ask your healthcare provider about the best exercise plan for you  Healthy meal plan for weight management:  A healthy meal plan includes a variety of foods, contains fewer calories, and helps you stay healthy   A healthy meal plan includes the following:     · Eat whole-grain foods more often  A healthy meal plan should contain fiber  Fiber is the part of grains, fruits, and vegetables that is not broken down by your body  Whole-grain foods are healthy and provide extra fiber in your diet  Some examples of whole-grain foods are whole-wheat breads and pastas, oatmeal, brown rice, and bulgur  · Eat a variety of vegetables every day  Include dark, leafy greens such as spinach, kale, rosey greens, and mustard greens  Eat yellow and orange vegetables such as carrots, sweet potatoes, and winter squash  · Eat a variety of fruits every day  Choose fresh or canned fruit (canned in its own juice or light syrup) instead of juice  Fruit juice has very little or no fiber  · Eat low-fat dairy foods  Drink fat-free (skim) milk or 1% milk  Eat fat-free yogurt and low-fat cottage cheese  Try low-fat cheeses such as mozzarella and other reduced-fat cheeses  · Choose meat and other protein foods that are low in fat  Choose beans or other legumes such as split peas or lentils  Choose fish, skinless poultry (chicken or turkey), or lean cuts of red meat (beef or pork)  Before you cook meat or poultry, cut off any visible fat  · Use less fat and oil  Try baking foods instead of frying them  Add less fat, such as margarine, sour cream, regular salad dressing and mayonnaise to foods  Eat fewer high-fat foods  Some examples of high-fat foods include french fries, doughnuts, ice cream, and cakes  · Eat fewer sweets  Limit foods and drinks that are high in sugar  This includes candy, cookies, regular soda, and sweetened drinks  Ways to decrease calories:   · Eat smaller portions  ? Use a small plate with smaller servings  ? Do not eat second helpings  ? When you eat at a restaurant, ask for a box and place half of your meal in the box before you eat  ? Share an entrée with someone else  · Replace high-calorie snacks with healthy, low-calorie snacks  ? Choose fresh fruit, vegetables, fat-free rice cakes, or air-popped popcorn instead of potato chips, nuts, or chocolate  ? Choose water or calorie-free drinks instead of soda or sweetened drinks  · Do not shop for groceries when you are hungry  You may be more likely to make unhealthy food choices  Take a grocery list of healthy foods and shop after you have eaten  · Eat regular meals  Do not skip meals  Skipping meals can lead to overeating later in the day  This can make it harder for you to lose weight  Eat a healthy snack in place of a meal if you do not have time to eat a regular meal  Talk with a dietitian to help you create a meal plan and schedule that is right for you  Other things to consider as you try to lose weight:   · Be aware of situations that may give you the urge to overeat, such as eating while watching television  Find ways to avoid these situations  For example, read a book, go for a walk, or do crafts  · Meet with a weight loss support group or friends who are also trying to lose weight  This may help you stay motivated to continue working on your weight loss goals  © Copyright [address] Information is for End User's use only and may not be sold, redistributed or otherwise used for commercial purposes  All illustrations and images included in CareNotes® are the copyrighted property of [employer]  or [employer]ochilt Guzman  The above information is an  only  It is not intended as medical advice for individual conditions or treatments  Talk to your doctor, nurse or pharmacist before following any medical regimen to see if it is safe and effective for you

## 2021-04-25 PROBLEM — E66.01 CLASS 2 SEVERE OBESITY DUE TO EXCESS CALORIES WITH SERIOUS COMORBIDITY AND BODY MASS INDEX (BMI) OF 35.0 TO 35.9 IN ADULT (HCC): Status: ACTIVE | Noted: 2021-04-25

## 2021-04-25 PROBLEM — E66.812 CLASS 2 SEVERE OBESITY DUE TO EXCESS CALORIES WITH SERIOUS COMORBIDITY AND BODY MASS INDEX (BMI) OF 35.0 TO 35.9 IN ADULT (HCC): Status: ACTIVE | Noted: 2021-04-25

## 2021-05-12 DIAGNOSIS — F51.01 PRIMARY INSOMNIA: ICD-10-CM

## 2021-05-12 RX ORDER — TEMAZEPAM 15 MG/1
CAPSULE ORAL
Qty: 60 CAPSULE | Refills: 3 | Status: SHIPPED | OUTPATIENT
Start: 2021-05-12 | End: 2021-07-30 | Stop reason: SDUPTHER

## 2021-05-12 NOTE — TELEPHONE ENCOUNTER
Medication:  PDMP   04/15/2021  2  01/11/2021  TEMAZEPAM 15 MG CAPSULE  60 0  30  DO MEN        Active agreement on file -No

## 2021-05-18 DIAGNOSIS — E03.8 HYPOTHYROIDISM DUE TO HASHIMOTO'S THYROIDITIS: ICD-10-CM

## 2021-05-18 DIAGNOSIS — E06.3 HYPOTHYROIDISM DUE TO HASHIMOTO'S THYROIDITIS: ICD-10-CM

## 2021-05-18 RX ORDER — LEVOTHYROXINE SODIUM 0.05 MG/1
TABLET ORAL
Qty: 30 TABLET | Refills: 0 | Status: SHIPPED | OUTPATIENT
Start: 2021-05-18 | End: 2021-06-18

## 2021-05-24 ENCOUNTER — RA CDI HCC (OUTPATIENT)
Dept: OTHER | Facility: HOSPITAL | Age: 56
End: 2021-05-24

## 2021-05-24 NOTE — PROGRESS NOTES
[facility]  coding opportunities          Chart reviewed, no opportunity found: CHART REVIEWED, NO OPPORTUNITY FOUND              Patients insurance company: [facility] [address]  (Medicare Advantage and Commercial)

## 2021-05-28 ENCOUNTER — TELEMEDICINE (OUTPATIENT)
Dept: FAMILY MEDICINE CLINIC | Facility: CLINIC | Age: 56
End: 2021-05-28
Payer: COMMERCIAL

## 2021-05-28 DIAGNOSIS — E66.01 CLASS 2 SEVERE OBESITY DUE TO EXCESS CALORIES WITH SERIOUS COMORBIDITY AND BODY MASS INDEX (BMI) OF 35.0 TO 35.9 IN ADULT (HCC): Primary | ICD-10-CM

## 2021-05-28 PROCEDURE — 99213 OFFICE O/P EST LOW 20 MIN: CPT | Performed by: NURSE PRACTITIONER

## 2021-05-28 NOTE — PROGRESS NOTES
Virtual Regular Visit      Assessment/Plan:    Problem List Items Addressed This Visit        Other    Class 2 severe obesity due to excess calories with serious comorbidity and body mass index (BMI) of 35 0 to 35 9 in adult ([facility] ) - Primary     Cont phentermine  F/u 2 months  Diet and exercise                      Reason for visit is   Chief Complaint   Patient presents with    Virtual Regular Visit        Encounter provider FRANCISCO J Francis    Provider located at [address] [id number]      Recent Visits  No visits were found meeting these conditions  Showing recent visits within past 7 days and meeting all other requirements     Today's Visits  Date Type Provider Dept   05/28/21 Telemedicine FRANCISCO J Francis [facility]   Showing today's visits and meeting all other requirements     Future Appointments  No visits were found meeting these conditions  Showing future appointments within next 150 days and meeting all other requirements        The patient was identified by name and date of birth  Grey Shipley was informed that this is a telemedicine visit and that the visit is being conducted through [facility] and patient was informed that this is a secure, HIPAA-compliant platform  She agrees to proceed     My office door was closed  No one else was in the room  She acknowledged consent and understanding of privacy and security of the video platform  The patient has agreed to participate and understands they can discontinue the visit at any time  Patient is aware this is a billable service  Raheem Matthew is a 64 y o  female          Follow up to obesity, overweight  On phentermine and doing well  Scale pao  Thinks she lost about 5 pounds  Notices she is concentrating better  Feels more relaxed  Sleeping better  No palpitations  No trouble sleeping at night         Past Medical History:   Diagnosis Date    Anemia     Anxiety     Arthritis     Asthma     Bariatric surgery status     Depression     Disease of thyroid gland     hypothyroid    Edema     GERD (gastroesophageal reflux disease)     History of anemia     History of edema     Hypertension     Insomnia     Morbid obesity (HCC)     Postgastrectomy malabsorption     Spinal stenosis        Past Surgical History:   Procedure Laterality Date    ANKLE SURGERY Bilateral     tendon repair    CARPAL TUNNEL RELEASE Bilateral     CHOLECYSTECTOMY      COLONOSCOPY      GASTRIC BYPASS      GASTRIC BYPASS LAPAROSCOPIC N/A 11/20/2018    Procedure: LAPAROSCOPIC REVISION OF MARBELLA-EN-Y BYPASS W/ROBOTICS & INTRAOP EGD;  Surgeon: Taylor Rahman MD;  [facility];  Service: Bariatrics    NEUROMA EXCISION      single Dewitt's neuroma    DC EGD TRANSORAL BIOPSY SINGLE/MULTIPLE N/A 8/22/2018    Procedure: ESOPHAGOGASTRODUODENOSCOPY (EGD); Surgeon: Taylor Rahman MD;  [facility]: [facility];   Service: Bariatrics    ROTATOR CUFF REPAIR      left    SHOULDER SURGERY      TONSILLECTOMY      TOTAL HIP ARTHROPLASTY Left     TOTAL KNEE ARTHROPLASTY Right        Current Outpatient Medications   Medication Sig Dispense Refill    busPIRone (BUSPAR) 7 5 mg tablet Take 1 tablet by mouth twice daily 60 tablet 5    escitalopram (LEXAPRO) 20 mg tablet Take 1 tablet (20 mg total) by mouth daily 30 tablet 5    levothyroxine 50 mcg tablet Take 1 tablet by mouth daily 30 tablet 0    lisinopril (ZESTRIL) 20 mg tablet Take 1 tablet (20 mg total) by mouth 2 (two) times a day 60 tablet 5    LORazepam (ATIVAN) 1 mg tablet Take 1 tablet by mouth twice daily as needed for anxiety 30 tablet 3    pantoprazole (PROTONIX) 40 mg tablet Take 1 tablet by mouth once daily 30 tablet 2    phentermine (ADIPEX-P) 37 5 MG tablet Take 1 tablet (37 5 mg total) by mouth daily 30 tablet 3    spironolactone (ALDACTONE) 50 mg tablet Take 1 tablet (50 mg total) by mouth daily 90 tablet 3    temazepam (RESTORIL) 15 mg capsule TAKE 2 CAPSULES BY MOUTH AT BEDTIME AS NEEDED FOR SLEEP 60 capsule 3     No current facility-administered medications for this visit  Allergies   Allergen Reactions    Amoxicillin Confusion, Delirium, Dizziness, Hives, Itching, Lightheadedness, Other (See Comments), Photosensitivity, Syncope and Visual Disturbance    Iodine - Food Allergy      IV dye  Uncontrolled sneezing      Medical Tape     Meperidine     Sulfa Antibiotics Hives    Sulfate        Review of Systems   Constitutional: Negative for fatigue and fever  HENT: Negative for congestion, postnasal drip and rhinorrhea  Respiratory: Negative for cough and shortness of breath  Cardiovascular: Negative for chest pain and palpitations  Gastrointestinal: Negative for constipation, diarrhea, nausea and vomiting  Genitourinary: Negative for dysuria and frequency  Musculoskeletal: Negative for arthralgias and myalgias  Skin: Negative for rash  Neurological: Negative for dizziness and headaches  Hematological: Negative for adenopathy  Psychiatric/Behavioral: Negative for dysphoric mood and sleep disturbance  The patient is not nervous/anxious  Video Exam    There were no vitals filed for this visit  Physical Exam  Constitutional:       Appearance: Normal appearance  HENT:      Head: Normocephalic and atraumatic  Eyes:      Conjunctiva/sclera: Conjunctivae normal    Pulmonary:      Effort: Pulmonary effort is normal    Musculoskeletal: Normal range of motion  Neurological:      Mental Status: She is alert and oriented to person, place, and time     Psychiatric:         Mood and Affect: Mood normal          Behavior: Behavior normal           I spent 15 minutes with patient today in which greater than 50% of the time was spent in counseling/coordination of care regarding overweight      VIRTUAL VISIT Kathy acknowledges that she has consented to an online visit or consultation  She understands that the online visit is based solely on information provided by her, and that, in the absence of a face-to-face physical evaluation by the physician, the diagnosis she receives is both limited and provisional in terms of accuracy and completeness  This is not intended to replace a full medical face-to-face evaluation by the physician  Prince Gordillo understands and accepts these terms

## 2021-05-28 NOTE — PROGRESS NOTES
FAMILY PRACTICE OFFICE VISIT       NAME: Angel River  AGE: 64 y o  SEX: female       : 1965        MRN: [id number]    DATE: 2021  TIME: 2:24 PM    Assessment and Plan   There are no diagnoses linked to this encounter  There are no Patient Instructions on file for this visit  Chief Complaint   No chief complaint on file        History of Present Illness   Angel River is a 64y o -year-old female who ***    Review of Systems   Review of Systems    Active Problem List     Patient Active Problem List   Diagnosis    Anxiety and depression    Asthma, well controlled    Cystic acne    Insomnia    Spinal stenosis    S/P bariatric surgery    Encounter for Papanicolaou smear for cervical cancer screening    Gastroesophageal reflux disease    Paraesophageal hernia    Anemia    Extrinsic asthma    Hashimoto's disease    Pelvic pain    Dyspareunia in female    Postgastrectomy malabsorption    Anxiety    Hypothyroidism due to Hashimoto's thyroiditis    Essential hypertension    Class 2 severe obesity due to excess calories with serious comorbidity and body mass index (BMI) of 35 0 to 35 9 in [facility])         Past Medical History:  Past Medical History:   Diagnosis Date    Anemia     Anxiety     Arthritis     Asthma     Bariatric surgery status     Depression     Disease of thyroid gland     hypothyroid    Edema     GERD (gastroesophageal reflux disease)     History of anemia     History of edema     Hypertension     Insomnia     Morbid obesity ([address] )     Postgastrectomy malabsorption     Spinal stenosis        Past Surgical History:  Past Surgical History:   Procedure Laterality Date    ANKLE SURGERY Bilateral     tendon repair    CARPAL TUNNEL RELEASE Bilateral     CHOLECYSTECTOMY      COLONOSCOPY      GASTRIC BYPASS      GASTRIC BYPASS LAPAROSCOPIC N/A 2018    Procedure: LAPAROSCOPIC REVISION OF MARBELLA-EN-Y BYPASS W/ROBOTICS & INTRAOP EGD;  Surgeon: Evelyne Glynn Dacia Flynn MD;  [facility];  Service: [address] [place] [address]      single Dewitt's neuroma    [place] EGD TRANSORAL BIOPSY SINGLE/MULTIPLE N/A 8/22/2018    Procedure: ESOPHAGOGASTRODUODENOSCOPY (EGD); Surgeon: Sammy Smith MD;  [facility]: [facility];   Service: Bariatrics    ROTATOR CUFF REPAIR      left    SHOULDER SURGERY      TONSILLECTOMY      TOTAL HIP ARTHROPLASTY Left     TOTAL KNEE ARTHROPLASTY Right        Family History:  Family History   Problem Relation Age of Onset    Hypertension Mother     Other Mother         cardiac disorder    Diabetes type II Mother         without complication, with long term use of insulin    Depression Father     No Known Problems Maternal Grandmother     No Known Problems Maternal Grandfather     No Known Problems Paternal Grandmother     No Known Problems Paternal Grandfather     No Known Problems Maternal Aunt     No Known Problems Maternal Aunt     No Known Problems Maternal Aunt        Social History:  Social History     Socioeconomic History    Marital status: /Civil Union     Spouse name: Not on file    Number of children: Not on file    Years of education: Not on file    Highest education level: Not on file   Occupational History    Not on file   Social Needs    Financial resource strain: Not on file    Food insecurity     Worry: Not on file     Inability: Not on file   [employer] needs     Medical: Not on file     Non-medical: Not on file   Tobacco Use    Smoking status: Never Smoker    Smokeless tobacco: Never Used   Substance and Sexual Activity    Alcohol use: Yes     Frequency: 4 or more times a week     Drinks per session: 1 or 2     Comment: social     Drug use: No    Sexual activity: Not Currently     Partners: Male   Lifestyle    Physical activity     Days per week: Not on file     Minutes per session: Not on file    Stress: Not on file   Relationships    Social connections     Talks on phone: Not on file Gets together: Not on file     Attends [employer] service: Not on file     Active member of club or organization: Not on file     Attends meetings of clubs or organizations: Not on file     Relationship status: Not on file    Intimate partner violence     Fear of current or ex partner: Not on file     Emotionally abused: Not on file     Physically abused: Not on file     Forced sexual activity: Not on file   Other Topics Concern    Not on file   Social History Narrative    Not on file       Objective   There were no vitals filed for this visit    Wt Readings from Last 3 Encounters:   04/23/21 107 kg (235 lb)   05/15/20 107 kg (235 lb)   04/11/20 107 kg (235 lb)       Physical Exam    Pertinent Laboratory/Diagnostic Studies:  Lab Results   Component Value Date    BUN 15 04/19/2021    CREATININE 0 79 04/19/2021    CALCIUM 9 1 04/19/2021    CALCIUM 9 1 04/19/2021    K 4 6 04/19/2021    CO2 26 04/19/2021     04/19/2021     Lab Results   Component Value Date    ALT 17 04/19/2021    AST 21 04/19/2021    ALKPHOS 91 04/19/2021       Lab Results   Component Value Date    WBC 5 4 04/19/2021    HGB 12 9 04/19/2021    HCT 39 6 04/19/2021    MCV 99 7 04/19/2021     04/19/2021       No results found for: TSH    No results found for: CHOL  Lab Results   Component Value Date    TRIG 75 04/19/2021     Lab Results   Component Value Date    HDL 77 04/19/2021     Lab Results   Component Value Date    LDLCALC 62 04/19/2021     No results found for: HGBA1C    Results for orders placed or performed in visit on 04/19/21   Lipid Panel with Direct LDL reflex   Result Value Ref Range    Total Cholesterol 155 <200 mg/dL    HDL 77 > OR = 50 mg/dL    Triglycerides 75 <150 mg/dL    LDL Calculated 62 mg/dL (calc)    Chol HDLC Ratio 2 0 <5 0 (calc)    Non-HDL Cholesterol 78 <130 mg/dL (calc)   PTH, Intact and Calcium   Result Value Ref Range    PTH, Intact 71 (H) 14 - 64 pg/mL    Calcium 9 1 8 6 - 10 4 mg/dL   Comprehensive metabolic panel   Result Value Ref Range    Glucose, Random 109 (H) 65 - 99 mg/dL    BUN 15 7 - 25 mg/dL    Creatinine 0 79 0 50 - 1 05 mg/dL    eGFR Non  84 > OR = 60 mL/min/1 73m2    eGFR  98 > OR = 60 mL/min/1 73m2    SL AMB BUN/CREATININE RATIO NOT APPLICABLE 6 - 22 (calc)    Sodium 140 135 - 146 mmol/L    Potassium 4 6 3 5 - 5 3 mmol/L    Chloride 105 98 - 110 mmol/L    CO2 26 20 - 32 mmol/L    Calcium 9 1 8 6 - 10 4 mg/dL    Protein, Total 6 7 6 1 - 8 1 g/dL    Albumin 4 1 3 6 - 5 1 g/dL    Globulin 2 6 1 9 - 3 7 g/dL (calc)    Albumin/Globulin Ratio 1 6 1 0 - 2 5 (calc)    TOTAL BILIRUBIN 0 8 0 2 - 1 2 mg/dL    Alkaline Phosphatase 91 37 - 153 U/L    AST 21 10 - 35 U/L    ALT 17 6 - 29 U/L   Vitamin B1, whole blood   Result Value Ref Range    Vitamin B1, Whole Blood 176 78 - 185 nmol/L   CBC and differential   Result Value Ref Range    White Blood Cell Count 5 4 3 8 - 10 8 Thousand/uL    Red Blood Cell Count 3 97 3 80 - 5 10 Million/uL    Hemoglobin 12 9 11 7 - 15 5 g/dL    HCT 39 6 35 0 - 45 0 %    MCV 99 7 80 0 - 100 0 fL    MCH 32 5 27 0 - 33 0 pg    MCHC 32 6 32 0 - 36 0 g/dL    RDW 12 3 11 0 - 15 0 %    Platelet Count 079 285 - 400 Thousand/uL    SL AMB MPV 10 3 7 5 - 12 5 fL    Neutrophils (Absolute) 3,380 1,500 - 7,800 cells/uL    Lymphocytes (Absolute) 1,436 850 - 3,900 cells/uL    Monocytes (Absolute) 432 200 - 950 cells/uL    Eosinophils (Absolute) 130 15 - 500 cells/uL    Basophils ABS 22 0 - 200 cells/uL    Neutrophils 62 6 %    Lymphocytes 26 6 %    Monocytes 8 0 %    Eosinophils 2 4 %    Basophils PCT 0 4 %   Vitamin D 25 hydroxy   Result Value Ref Range    Vitamin D, 25-Hydroxy, Serum 32 30 - 100 ng/mL   TSH, 3rd generation with Free T4 reflex   Result Value Ref Range    TSH W/RFX TO FREE T4 3 13 mIU/L       No orders of the defined types were placed in this encounter        ALLERGIES:  Allergies   Allergen Reactions    Amoxicillin Confusion, Delirium, Dizziness, Hives, Itching, Lightheadedness, Other (See Comments), Photosensitivity, Syncope and Visual Disturbance    Iodine - Food Allergy      IV dye  Uncontrolled sneezing      Medical Tape     Meperidine     Sulfa Antibiotics Hives    Sulfate        Current Medications     Current Outpatient Medications   Medication Sig Dispense Refill    busPIRone (BUSPAR) 7 5 mg tablet Take 1 tablet by mouth twice daily 60 tablet 5    escitalopram (LEXAPRO) 20 mg tablet Take 1 tablet (20 mg total) by mouth daily 30 tablet 5    levothyroxine 50 mcg tablet Take 1 tablet by mouth daily 30 tablet 0    lisinopril (ZESTRIL) 20 mg tablet Take 1 tablet (20 mg total) by mouth 2 (two) times a day 60 tablet 5    LORazepam (ATIVAN) 1 mg tablet Take 1 tablet by mouth twice daily as needed for anxiety 30 tablet 3    pantoprazole (PROTONIX) 40 mg tablet Take 1 tablet by mouth once daily 30 tablet 2    phentermine (ADIPEX-P) 37 5 MG tablet Take 1 tablet (37 5 mg total) by mouth daily 30 tablet 3    spironolactone (ALDACTONE) 50 mg tablet Take 1 tablet (50 mg total) by mouth daily 90 tablet 3    temazepam (RESTORIL) 15 mg capsule TAKE 2 CAPSULES BY MOUTH AT BEDTIME AS NEEDED FOR SLEEP 60 capsule 3     No current facility-administered medications for this visit            Health Maintenance     Health Maintenance   Topic Date Due    HIV Screening  05/08/2023 (Originally 4/28/1980)    Hepatitis C Screening  05/23/2023 (Originally 1965)    Influenza Vaccine (Season Ended) 09/01/2021    BMI: Followup Plan  04/23/2022    BMI: Adult  04/23/2022    Annual Physical  04/23/2022    Depression Remission PHQ  04/23/2022    Cervical Cancer Screening  09/13/2022    Colorectal Cancer Screening  10/17/2026    DTaP,Tdap,and Td Vaccines (2 - Td) 04/23/2031    COVID-19 Vaccine  Completed    HIB Vaccine  Aged Out    Hepatitis B Vaccine  Aged Out    IPV Vaccine  Aged Out    Hepatitis A Vaccine  Aged Out    Meningococcal ACWY Vaccine  Aged Out    HPV Vaccine  Aged Out    Pneumococcal Vaccine: Pediatrics (0 to 5 Years) and At-Risk Patients (6 to 59 Years)  Discontinued     Immunization History   Administered Date(s) Administered    SARS-CoV-2 / COVID-19 mRNA IM (Pfizer-BioNTech) 03/10/2021, 04/01/2021    Tdap 04/23/2021          FRANCISCO J Silva

## 2021-07-04 DIAGNOSIS — I10 ESSENTIAL HYPERTENSION: ICD-10-CM

## 2021-07-06 RX ORDER — LISINOPRIL 20 MG/1
TABLET ORAL
Qty: 60 TABLET | Refills: 0 | Status: SHIPPED | OUTPATIENT
Start: 2021-07-06 | End: 2021-07-30 | Stop reason: SDUPTHER

## 2021-07-12 DIAGNOSIS — F41.9 ANXIETY: ICD-10-CM

## 2021-07-12 RX ORDER — ESCITALOPRAM OXALATE 20 MG/1
TABLET ORAL
Qty: 30 TABLET | Refills: 0 | Status: SHIPPED | OUTPATIENT
Start: 2021-07-12 | End: 2021-07-30 | Stop reason: SDUPTHER

## 2021-07-26 ENCOUNTER — RA CDI HCC (OUTPATIENT)
Dept: OTHER | Facility: HOSPITAL | Age: 56
End: 2021-07-26

## 2021-07-26 NOTE — PROGRESS NOTES
[facility]  coding opportunities          Chart reviewed, no opportunity found: CHART REVIEWED, NO OPPORTUNITY FOUND                     Patients insurance company: [facility] [address]  (Medicare Advantage and Commercial)

## 2021-07-29 DIAGNOSIS — K21.9 GASTROESOPHAGEAL REFLUX DISEASE WITHOUT ESOPHAGITIS: ICD-10-CM

## 2021-07-29 RX ORDER — PANTOPRAZOLE SODIUM 40 MG/1
TABLET, DELAYED RELEASE ORAL
Qty: 30 TABLET | Refills: 0 | Status: SHIPPED | OUTPATIENT
Start: 2021-07-29 | End: 2021-07-30 | Stop reason: SDUPTHER

## 2021-07-30 ENCOUNTER — TELEMEDICINE (OUTPATIENT)
Dept: FAMILY MEDICINE CLINIC | Facility: CLINIC | Age: 56
End: 2021-07-30
Payer: COMMERCIAL

## 2021-07-30 VITALS — HEIGHT: 68 IN | WEIGHT: 253 LBS | BODY MASS INDEX: 38.34 KG/M2

## 2021-07-30 DIAGNOSIS — F41.9 ANXIETY AND DEPRESSION: ICD-10-CM

## 2021-07-30 DIAGNOSIS — F51.01 PRIMARY INSOMNIA: ICD-10-CM

## 2021-07-30 DIAGNOSIS — I10 ESSENTIAL HYPERTENSION: Primary | ICD-10-CM

## 2021-07-30 DIAGNOSIS — E06.3 HYPOTHYROIDISM DUE TO HASHIMOTO'S THYROIDITIS: ICD-10-CM

## 2021-07-30 DIAGNOSIS — F32.A ANXIETY AND DEPRESSION: ICD-10-CM

## 2021-07-30 DIAGNOSIS — L70.0 CYSTIC ACNE: ICD-10-CM

## 2021-07-30 DIAGNOSIS — E66.01 CLASS 2 SEVERE OBESITY DUE TO EXCESS CALORIES WITH SERIOUS COMORBIDITY AND BODY MASS INDEX (BMI) OF 35.0 TO 35.9 IN ADULT (HCC): ICD-10-CM

## 2021-07-30 DIAGNOSIS — F41.9 ANXIETY: ICD-10-CM

## 2021-07-30 DIAGNOSIS — K21.9 GASTROESOPHAGEAL REFLUX DISEASE WITHOUT ESOPHAGITIS: ICD-10-CM

## 2021-07-30 DIAGNOSIS — E03.8 HYPOTHYROIDISM DUE TO HASHIMOTO'S THYROIDITIS: ICD-10-CM

## 2021-07-30 PROCEDURE — 99214 OFFICE O/P EST MOD 30 MIN: CPT | Performed by: NURSE PRACTITIONER

## 2021-07-30 NOTE — PROGRESS NOTES
Virtual Regular Visit    Verification of patient location:    Patient is located in the following state in which I hold an active license [place]      Assessment/Plan:    Problem List Items Addressed This Visit        Digestive    Gastroesophageal reflux disease    Relevant Medications    pantoprazole (PROTONIX) 40 mg tablet       Endocrine    Hypothyroidism due to Hashimoto's thyroiditis    Relevant Medications    levothyroxine 50 mcg tablet       Cardiovascular and Mediastinum    Essential hypertension - Primary    Relevant Medications    lisinopril (ZESTRIL) 20 mg tablet    spironolactone (ALDACTONE) 50 mg tablet       Musculoskeletal and Integument    Cystic acne    Relevant Medications    spironolactone (ALDACTONE) 50 mg tablet       Other    Insomnia    Relevant Medications    temazepam (RESTORIL) 15 mg capsule    Class 2 severe obesity due to excess calories with serious comorbidity and body mass index (BMI) of 35 0 to 35 9 in [facility])    Relevant Medications    phentermine (ADIPEX-P) 37 5 MG tablet    Anxiety and depression    Relevant Medications    busPIRone (BUSPAR) 7 5 mg tablet    escitalopram (LEXAPRO) 20 mg tablet    LORazepam (ATIVAN) 1 mg tablet    phentermine (ADIPEX-P) 37 5 MG tablet    temazepam (RESTORIL) 15 mg capsule    Anxiety    Relevant Medications    escitalopram (LEXAPRO) 20 mg tablet    LORazepam (ATIVAN) 1 mg tablet               Reason for visit is   Chief Complaint   Patient presents with    Virtual Regular Visit        Encounter provider FRANCISCO J Johnson    Provider located at [address] [id number]      Recent Visits  Date Type Provider Dept   07/30/21 6257 FRANCISCO J [facility]   Showing recent visits within past 7 days and meeting all other requirements  Future Appointments  No visits were found meeting these conditions    Showing future appointments within next 150 days and meeting all other requirements       The patient was identified by name and date of birth  Bernadine Lobo was informed that this is a telemedicine visit and that the visit is being conducted through [facility] and patient was informed that this is a secure, HIPAA-compliant platform  She agrees to proceed     My office door was closed  No one else was in the room  She acknowledged consent and understanding of privacy and security of the video platform  The patient has agreed to participate and understands they can discontinue the visit at any time  Patient is aware this is a billable service  Rogelia Prader is a 64 y o  female   Follow up to chronic medical conditions    Family issues with sonZACH (has grandchild they cannot see recently)  Father in law finally back home, mild memory issues now  Losing weight now  Lost 14 pounds  Jeans looser  Has appt with hand surgeon next week         Past Medical History:   Diagnosis Date    Anemia     Anxiety     Arthritis     Asthma     Bariatric surgery status     Depression     Disease of thyroid gland     hypothyroid    Edema     GERD (gastroesophageal reflux disease)     History of anemia     History of edema     Hypertension     Insomnia     Morbid obesity ([address] )     Postgastrectomy malabsorption     Spinal stenosis        Past Surgical History:   Procedure Laterality Date    ANKLE SURGERY Bilateral     tendon repair    CARPAL TUNNEL RELEASE Bilateral     CHOLECYSTECTOMY      COLONOSCOPY      GASTRIC BYPASS      GASTRIC BYPASS LAPAROSCOPIC N/A 11/20/2018    Procedure: LAPAROSCOPIC REVISION OF MARBELLA-EN-Y BYPASS W/ROBOTICS & INTRAOP EGD;  Surgeon: Gerhardt Ely, MD;  [facility];  Service: Bariatrics    NEUROMA EXCISION      single Dewitt's neuroma    [place] E[place] TRANSORAL BIOPSY SINGLE/MULTIPLE N/A 8/22/2018    Procedure: ESOPHAGOGASTRODUODENOSCOPY (EGD); Surgeon: Gerhardt Ely, MD;  [facility];   Service: Bariatrics    ROTATOR CUFF REPAIR      left    SHOULDER SURGERY      TONSILLECTOMY      TOTAL HIP ARTHROPLASTY Left     TOTAL KNEE ARTHROPLASTY Right        Current Outpatient Medications   Medication Sig Dispense Refill    busPIRone (BUSPAR) 7 5 mg tablet Take 1 tablet (7 5 mg total) by mouth 2 (two) times a day 180 tablet 3    escitalopram (LEXAPRO) 20 mg tablet Take 1 tablet (20 mg total) by mouth daily 90 tablet 3    levothyroxine 50 mcg tablet Take 1 tablet (50 mcg total) by mouth daily 90 tablet 3    lisinopril (ZESTRIL) 20 mg tablet Take 1 tablet (20 mg total) by mouth 2 (two) times a day 180 tablet 3    LORazepam (ATIVAN) 1 mg tablet Take 1 tablet (1 mg total) by mouth 2 (two) times a day as needed for anxiety 30 tablet 3    pantoprazole (PROTONIX) 40 mg tablet Take 1 tablet (40 mg total) by mouth daily 90 tablet 3    phentermine (ADIPEX-P) 37 5 MG tablet Take 1 tablet (37 5 mg total) by mouth daily 30 tablet 3    spironolactone (ALDACTONE) 50 mg tablet Take 1 tablet (50 mg total) by mouth daily 90 tablet 3    temazepam (RESTORIL) 15 mg capsule Take 1 capsule (15 mg total) by mouth daily at bedtime as needed for sleep 60 capsule 5     No current facility-administered medications for this visit  Allergies   Allergen Reactions    Amoxicillin Confusion, Delirium, Dizziness, Hives, Itching, Lightheadedness, Other (See Comments), Photosensitivity, Syncope and Visual Disturbance    Iodine - Food Allergy      IV dye  Uncontrolled sneezing      Medical Tape     Meperidine     Sulfa Antibiotics Hives    Sulfate        Review of Systems   Constitutional: Negative for fatigue and fever  HENT: Negative for congestion and postnasal drip  Respiratory: Negative for cough and shortness of breath  Cardiovascular: Negative for chest pain and palpitations  Gastrointestinal: Negative for constipation and diarrhea  Musculoskeletal: Negative for arthralgias and myalgias  Skin: Negative for rash     Neurological: Negative for dizziness and headaches  Hematological: Negative for adenopathy  Psychiatric/Behavioral: Negative for dysphoric mood  The patient is not nervous/anxious  Video Exam    Vitals:    07/30/21 1352   Weight: 115 kg (253 lb)   Height: 5' 8" (1 727 m)       Physical Exam  Constitutional:       Appearance: Normal appearance  HENT:      Head: Normocephalic and atraumatic  Eyes:      Conjunctiva/sclera: Conjunctivae normal    Pulmonary:      Effort: Pulmonary effort is normal    Musculoskeletal:         General: Normal range of motion  Cervical back: Normal range of motion  Neurological:      Mental Status: She is alert and oriented to person, place, and time  Psychiatric:         Mood and Affect: Mood normal          Behavior: Behavior normal          Thought Content: Thought content normal          Judgment: Judgment normal           I spent 20 minutes with patient today in which greater than 50% of the time was spent in counseling/coordination of care regarding chronic medical conditions in assessment/plan    [address] [place] [address] verbally agrees to participate in [employer]  Pt is aware that [employer] could be limited without vital signs or the ability to perform a full hands-on physical Guilherme Lei understands she or the provider may request at any time to terminate the video visit and request the patient to seek care or treatment in person

## 2021-08-01 RX ORDER — TEMAZEPAM 15 MG/1
15 CAPSULE ORAL
Qty: 60 CAPSULE | Refills: 5 | Status: SHIPPED | OUTPATIENT
Start: 2021-08-01 | End: 2021-10-07 | Stop reason: SDUPTHER

## 2021-08-01 RX ORDER — LISINOPRIL 20 MG/1
20 TABLET ORAL 2 TIMES DAILY
Qty: 180 TABLET | Refills: 3 | Status: SHIPPED | OUTPATIENT
Start: 2021-08-01

## 2021-08-01 RX ORDER — LORAZEPAM 1 MG/1
1 TABLET ORAL 2 TIMES DAILY PRN
Qty: 30 TABLET | Refills: 3 | Status: SHIPPED | OUTPATIENT
Start: 2021-08-01 | End: 2021-10-07 | Stop reason: SDUPTHER

## 2021-08-01 RX ORDER — BUSPIRONE HYDROCHLORIDE 7.5 MG/1
7.5 TABLET ORAL 2 TIMES DAILY
Qty: 180 TABLET | Refills: 3 | Status: SHIPPED | OUTPATIENT
Start: 2021-08-01

## 2021-08-01 RX ORDER — PHENTERMINE HYDROCHLORIDE 37.5 MG/1
37.5 TABLET ORAL DAILY
Qty: 30 TABLET | Refills: 3 | Status: SHIPPED | OUTPATIENT
Start: 2021-08-01 | End: 2022-04-07

## 2021-08-01 RX ORDER — SPIRONOLACTONE 50 MG/1
50 TABLET, FILM COATED ORAL DAILY
Qty: 90 TABLET | Refills: 3 | Status: SHIPPED | OUTPATIENT
Start: 2021-08-01 | End: 2022-05-16

## 2021-08-01 RX ORDER — ESCITALOPRAM OXALATE 20 MG/1
20 TABLET ORAL DAILY
Qty: 90 TABLET | Refills: 3 | Status: SHIPPED | OUTPATIENT
Start: 2021-08-01

## 2021-08-01 RX ORDER — PANTOPRAZOLE SODIUM 40 MG/1
40 TABLET, DELAYED RELEASE ORAL DAILY
Qty: 90 TABLET | Refills: 3 | Status: SHIPPED | OUTPATIENT
Start: 2021-08-01

## 2021-08-01 RX ORDER — LEVOTHYROXINE SODIUM 0.05 MG/1
50 TABLET ORAL DAILY
Qty: 90 TABLET | Refills: 3 | Status: SHIPPED | OUTPATIENT
Start: 2021-08-01

## 2021-08-05 ENCOUNTER — APPOINTMENT (OUTPATIENT)
Dept: RADIOLOGY | Facility: AMBULARY SURGERY CENTER | Age: 56
End: 2021-08-05
Attending: SURGERY
Payer: COMMERCIAL

## 2021-08-05 ENCOUNTER — OFFICE VISIT (OUTPATIENT)
Dept: OBGYN CLINIC | Facility: CLINIC | Age: 56
End: 2021-08-05
Payer: COMMERCIAL

## 2021-08-05 VITALS
HEART RATE: 76 BPM | RESPIRATION RATE: 17 BRPM | HEIGHT: 68 IN | BODY MASS INDEX: 38.34 KG/M2 | SYSTOLIC BLOOD PRESSURE: 130 MMHG | WEIGHT: 253 LBS | DIASTOLIC BLOOD PRESSURE: 78 MMHG

## 2021-08-05 DIAGNOSIS — M25.531 PAIN IN RIGHT WRIST: ICD-10-CM

## 2021-08-05 DIAGNOSIS — M79.641 RIGHT HAND PAIN: ICD-10-CM

## 2021-08-05 DIAGNOSIS — M18.11 PRIMARY OSTEOARTHRITIS OF FIRST CARPOMETACARPAL JOINT OF RIGHT HAND: Primary | ICD-10-CM

## 2021-08-05 PROCEDURE — 99214 OFFICE O/P EST MOD 30 MIN: CPT | Performed by: SURGERY

## 2021-08-05 PROCEDURE — 73110 X-RAY EXAM OF WRIST: CPT

## 2021-08-05 PROCEDURE — 73130 X-RAY EXAM OF HAND: CPT

## 2021-08-05 NOTE — PROGRESS NOTES
Paul CHRISTOPHER  Attending, Orthopaedic Surgery  Hand, Wrist, and Elbow Surgery  [facility]      ORTHOPAEDIC HAND, WRIST, AND ELBOW OFFICE  VISIT       ASSESSMENT/PLAN:      Right Thumb CMC Arthritis, STT Arthritis    Diagnosis and treatment options for thumb CMC arthritis were discussed in the office today  Treatment options discussed included bracing, Voltaren gel, injections, and surgical intervention  Patient was offered a CSI to the right thumb CMC but she declined  Patient was provided a comfort cool brace, as well as a prescription for voltaren gel  Patient may return to the office as needed if her symptoms do not resolve, or if she would like a CSI to help with her symptoms  The patient verbalized understanding of exam findings and treatment plan  We engaged in the shared decision-making process and treatment options were discussed at length with the patient  Surgical and conservative management discussed today along with risks and benefits  Diagnoses and all orders for this visit:    Primary osteoarthritis of first carpometacarpal joint of right hand  -     XR hand 3+ vw right; Future  -     Thumb Cude comf/Cool  -     Diclofenac Sodium (VOLTAREN) 1 %; Apply 2 g topically 4 (four) times a day    Pain in right wrist  -     XR wrist 3+ vw right; Future  -     Diclofenac Sodium (VOLTAREN) 1 %; Apply 2 g topically 4 (four) times a day        Follow Up:  No follow-ups on file  To Do Next Visit:  Re-evaluation of current issue      General Discussions:  [facility] Arthritis: The anatomy and physiology of carpometacarpal joint arthritis was discussed with the patient today in the office  Deterioration of the articular cartilage eventually leads to hypermobility at the thumb [facility] joint, resulting in joint subluxation, osteophyte formation, cystic changes within the trapezium and base of the first metacarpal, as well as subchondral sclerosis    Eventually, pain, limited mobility, and compensatory hyperextension at the metacarpophalangeal joint may develop  While normal activity and usage of the thumb joint may provide a painful experience to the patient, this typically does not result in damage to the thumb or hand  Treatment options include resting thumb spica splints to decreased joint edema, pain, and inflammation  Therapy exercises to strengthen the thenar musculature may relieve pain, but do not alter the overall continued development of osteoarthritis  Oral medications, topical medications, corticosteroid injections may decrease pain and increase overall function  Eventually, approximately 5% of patients may require surgical intervention  Operative Discussions:  n/a      ____________________________________________________________________________________________________________________________________________      CHIEF COMPLAINT:  Chief Complaint   Patient presents with    Right Wrist - Pain    Right Hand - Pain    Right Thumb - Pain       SUBJECTIVE:  Jossue Abraham is a 64y o  year old RHD female who presents with right thumb pain  Patient had a fall approximately 4 weeks ago, landing on an outstretched hand  At that time she had swelling and ecchymosis over the volar/radial hand  Since that time she has had persistent, moderate pain in the base of the thumb  Pain is worse with activity, better with rest  No numbness or tingling in the hand  No previous thumb pain          Pain/symptom timing:  Worse during the day when active  Pain/symptom context:  Worse with activites and work  Pain/symptom modifying factors:  Rest makes better, activities make worse  Pain/symptom associated signs/symptoms: none    Prior treatment   · NSAIDsYes   · Injections No   · Bracing/Orthotics Yes    Physical Therapy No     I have personally reviewed all the relevant PMH, PSH, SH, FH, Medications and allergies      PAST MEDICAL HISTORY:  Past Medical History:   Diagnosis Date    Anemia     Anxiety     Arthritis     Asthma     Bariatric surgery status     Depression     Disease of thyroid gland     hypothyroid    Edema     GERD (gastroesophageal reflux disease)     History of anemia     History of edema     Hypertension     Insomnia     Morbid obesity ([address] )     Postgastrectomy malabsorption     Spinal stenosis        PAST SURGICAL HISTORY:  Past Surgical History:   Procedure Laterality Date    ANKLE SURGERY Bilateral     tendon repair    CARPAL TUNNEL RELEASE Bilateral     CHOLECYSTECTOMY      COLONOSCOPY      GASTRIC BYPASS      GASTRIC BYPASS LAPAROSCOPIC N/A 11/20/2018    Procedure: LAPAROSCOPIC REVISION OF MARBELLA-EN-Y BYPASS W/ROBOTICS & INTRAOP EGD;  Surgeon: Jackie Johnson MD;  [facility];  Service: Bariatrics    [address] [place] [address]      single Dewitt's neuroma    [place] E[place] TRANSORAL BIOPSY SINGLE/MULTIPLE N/A 8/22/2018    Procedure: ESOPHAGOGASTRODUODENOSCOPY (EGD); Surgeon: Jackie Johnson MD;  [facility];   Service: Bariatrics    ROTATOR CUFF REPAIR      left    SHOULDER SURGERY      TONSILLECTOMY      TOTAL HIP ARTHROPLASTY Left     TOTAL KNEE ARTHROPLASTY Right        FAMILY HISTORY:  Family History   Problem Relation Age of Onset    Hypertension Mother     Other Mother         cardiac disorder    Diabetes type II Mother         without complication, with long term use of insulin    Depression Father     No Known Problems Maternal Grandmother     No Known Problems Maternal Grandfather     No Known Problems Paternal Grandmother     No Known Problems Paternal Grandfather     No Known Problems Maternal Aunt     No Known Problems Maternal Aunt     No Known Problems Maternal Aunt        SOCIAL HISTORY:  Social History     Tobacco Use    Smoking status: Never Smoker    Smokeless tobacco: Never Used   Vaping Use    Vaping Use: Never used   Substance Use Topics    Alcohol use: Yes     Comment: social     Drug use: No       MEDICATIONS:    Current Outpatient Medications:     busPIRone (BUSPAR) 7 5 mg tablet, Take 1 tablet (7 5 mg total) by mouth 2 (two) times a day, Disp: 180 tablet, Rfl: 3    escitalopram (LEXAPRO) 20 mg tablet, Take 1 tablet (20 mg total) by mouth daily, Disp: 90 tablet, Rfl: 3    levothyroxine 50 mcg tablet, Take 1 tablet (50 mcg total) by mouth daily, Disp: 90 tablet, Rfl: 3    lisinopril (ZESTRIL) 20 mg tablet, Take 1 tablet (20 mg total) by mouth 2 (two) times a day, Disp: 180 tablet, Rfl: 3    LORazepam (ATIVAN) 1 mg tablet, Take 1 tablet (1 mg total) by mouth 2 (two) times a day as needed for anxiety, Disp: 30 tablet, Rfl: 3    pantoprazole (PROTONIX) 40 mg tablet, Take 1 tablet (40 mg total) by mouth daily, Disp: 90 tablet, Rfl: 3    phentermine (ADIPEX-P) 37 5 MG tablet, Take 1 tablet (37 5 mg total) by mouth daily, Disp: 30 tablet, Rfl: 3    spironolactone (ALDACTONE) 50 mg tablet, Take 1 tablet (50 mg total) by mouth daily, Disp: 90 tablet, Rfl: 3    temazepam (RESTORIL) 15 mg capsule, Take 1 capsule (15 mg total) by mouth daily at bedtime as needed for sleep, Disp: 60 capsule, Rfl: 5    Diclofenac Sodium (VOLTAREN) 1 %, Apply 2 g topically 4 (four) times a day, Disp: 50 g, Rfl: 1    ALLERGIES:  Allergies   Allergen Reactions    Amoxicillin Confusion, Delirium, Dizziness, Hives, Itching, Lightheadedness, Other (See Comments), Photosensitivity, Syncope and Visual Disturbance    Iodine - Food Allergy      IV dye  Uncontrolled sneezing      Medical Tape     Meperidine     Sulfa Antibiotics Hives    Sulfate            REVIEW OF SYSTEMS:  Review of Systems    VITALS:  Vitals:    08/05/21 1427   BP: 130/78   Pulse: 76   Resp: 17       LABS:  HgA1c: No results found for: HGBA1C  BMP:   Lab Results   Component Value Date    CALCIUM 9 1 04/19/2021    CALCIUM 9 1 04/19/2021    K 4 6 04/19/2021    CO2 26 04/19/2021     04/19/2021    BUN 15 04/19/2021    CREATININE 0 79 04/19/2021       _____________________________________________________  PHYSICAL EXAMINATION:  General: well developed and well nourished, alert, oriented times 3 and appears comfortable  Psychiatric: Normal  HEENT: Normocephalic, Atraumatic Trachea Midline, No torticollis  Pulmonary: No audible wheezing or respiratory distress   Abdomen/GI: Non tender, non distended   Cardiovascular: No pitting edema, 2+ radial pulse   Skin: No masses, erythema, lacerations, fluctation, ulcerations  Neurovascular: Sensation Intact to the Median, Ulnar, Radial Nerve, Motor Intact to the Median, Ulnar, Radial Nerve and Pulses Intact  Musculoskeletal: Normal, except as noted in detailed exam and in HPI  MUSCULOSKELETAL EXAMINATION:  MSK right upper extremity   Skin intact without erythema or ecchymosis   TTP over thumb CMC   SILT M/R/U   Motor intact FDP2, FDP5, FDI, EDC, APB   Basilar Thumb Arthritis: Negative circumduction and No MP joint hyperextension, pain with thumb traction   2 sec cap refill      ___________________________________________________  STUDIES REVIEWED:  I have personally reviewed AP lateral and oblique radiographs of right hand and wrist which demonstrate moderate to severe thumb CMC arthritis, as well as moderate STT arthritis              PROCEDURES PERFORMED:  Procedures  No Procedures performed today    _____________________________________________________      Maria Dolores Kay    I,:   am acting as a scribe while in the presence of the attending physician :       I,:   personally performed the services described in this documentation    as scribed in my presence :

## 2021-12-13 ENCOUNTER — OFFICE VISIT (OUTPATIENT)
Dept: FAMILY MEDICINE CLINIC | Facility: CLINIC | Age: 56
End: 2021-12-13
Payer: COMMERCIAL

## 2021-12-13 VITALS
OXYGEN SATURATION: 98 % | HEART RATE: 78 BPM | WEIGHT: 243 LBS | TEMPERATURE: 98.2 F | HEIGHT: 68 IN | BODY MASS INDEX: 36.83 KG/M2 | SYSTOLIC BLOOD PRESSURE: 148 MMHG | RESPIRATION RATE: 16 BRPM | DIASTOLIC BLOOD PRESSURE: 82 MMHG

## 2021-12-13 DIAGNOSIS — M25.512 ACUTE PAIN OF LEFT SHOULDER: Primary | ICD-10-CM

## 2021-12-13 DIAGNOSIS — J45.20 ASTHMA, MILD INTERMITTENT, WELL-CONTROLLED: ICD-10-CM

## 2021-12-13 PROCEDURE — 99213 OFFICE O/P EST LOW 20 MIN: CPT | Performed by: NURSE PRACTITIONER

## 2022-04-07 DIAGNOSIS — E66.01 CLASS 2 SEVERE OBESITY DUE TO EXCESS CALORIES WITH SERIOUS COMORBIDITY AND BODY MASS INDEX (BMI) OF 35.0 TO 35.9 IN ADULT (HCC): ICD-10-CM

## 2022-04-07 RX ORDER — PHENTERMINE HYDROCHLORIDE 37.5 MG/1
TABLET ORAL
Qty: 30 TABLET | Refills: 0 | Status: SHIPPED | OUTPATIENT
Start: 2022-04-07 | End: 2022-05-20

## 2022-05-17 DIAGNOSIS — L70.0 CYSTIC ACNE: ICD-10-CM

## 2022-05-17 RX ORDER — SPIRONOLACTONE 50 MG/1
TABLET, FILM COATED ORAL
Qty: 90 TABLET | Refills: 0 | OUTPATIENT
Start: 2022-05-17

## 2022-06-06 ENCOUNTER — DOCUMENTATION (OUTPATIENT)
Dept: FAMILY MEDICINE CLINIC | Facility: CLINIC | Age: 57
End: 2022-06-06

## 2022-06-06 NOTE — PROGRESS NOTES
Spoke with Kelly Alejandre   She said that if the patient wants to use good RX to pay cash it is as follows:  15 mg X 2 Daily [facility] $6 78       [facility] $7 24       Dileep Hernandez $6 78

## 2022-06-06 NOTE — PROGRESS NOTES
Prior authorization needed for the temazepam as max dose is 1/2 tablet daily   Received denial  Will inform the patient and the provider

## 2022-06-08 ENCOUNTER — TELEPHONE (OUTPATIENT)
Dept: FAMILY MEDICINE CLINIC | Facility: CLINIC | Age: 57
End: 2022-06-08

## 2022-06-08 DIAGNOSIS — F51.01 PRIMARY INSOMNIA: ICD-10-CM

## 2022-06-08 NOTE — TELEPHONE ENCOUNTER
Called the pharmacy back and cancelled the refills   Called the patient and asked her to call me when she is ready for her refills to be sent to [facility] (5 days prior)

## 2022-06-08 NOTE — TELEPHONE ENCOUNTER
Pharmacy is requesting a call back to verify if office would like pharmacy to deactivate temazepam (RESTORIL) 15 mg capsule

## 2022-06-08 NOTE — TELEPHONE ENCOUNTER
Called the pharmacy to cancel the script for the Temazepam but was informed that the patient paid cash and picked it up on 6/1/2022

## 2022-06-08 NOTE — PROGRESS NOTES
Called the patient again and left a detailed message that we need to know what she wants to do  Patient called back and gave me the information for the [facility] she will use for this medication

## 2022-08-11 ENCOUNTER — TELEPHONE (OUTPATIENT)
Dept: DERMATOLOGY | Facility: CLINIC | Age: 57
End: 2022-08-11

## 2022-08-11 NOTE — TELEPHONE ENCOUNTER
Pt left voicemail to schedule consult (referral in chart) for cystic acne  Returned call and left message for patient to call back to make an appt

## 2022-08-12 DIAGNOSIS — F41.9 ANXIETY: ICD-10-CM

## 2022-08-12 RX ORDER — ESCITALOPRAM OXALATE 20 MG/1
TABLET ORAL
Qty: 30 TABLET | Refills: 0 | Status: SHIPPED | OUTPATIENT
Start: 2022-08-12 | End: 2022-09-17

## 2022-08-16 ENCOUNTER — TELEPHONE (OUTPATIENT)
Dept: DERMATOLOGY | Facility: CLINIC | Age: 57
End: 2022-08-16

## 2022-08-16 LAB
LEFT EYE DIABETIC RETINOPATHY: NORMAL
RIGHT EYE DIABETIC RETINOPATHY: NORMAL

## 2022-08-25 DIAGNOSIS — Q15.9 EYE ABNORMALITY: Primary | ICD-10-CM

## 2022-09-19 ENCOUNTER — TELEPHONE (OUTPATIENT)
Dept: FAMILY MEDICINE CLINIC | Facility: CLINIC | Age: 57
End: 2022-09-19

## 2022-10-10 DIAGNOSIS — E66.01 CLASS 2 SEVERE OBESITY DUE TO EXCESS CALORIES WITH SERIOUS COMORBIDITY AND BODY MASS INDEX (BMI) OF 35.0 TO 35.9 IN ADULT (HCC): ICD-10-CM

## 2022-10-10 DIAGNOSIS — K21.9 GASTROESOPHAGEAL REFLUX DISEASE WITHOUT ESOPHAGITIS: ICD-10-CM

## 2022-10-10 RX ORDER — PANTOPRAZOLE SODIUM 40 MG/1
TABLET, DELAYED RELEASE ORAL
Qty: 30 TABLET | Refills: 0 | Status: SHIPPED | OUTPATIENT
Start: 2022-10-10

## 2022-10-10 RX ORDER — PHENTERMINE HYDROCHLORIDE 37.5 MG/1
TABLET ORAL
Qty: 30 TABLET | Refills: 0 | Status: SHIPPED | OUTPATIENT
Start: 2022-10-10

## 2022-10-11 ENCOUNTER — TELEPHONE (OUTPATIENT)
Dept: FAMILY MEDICINE CLINIC | Facility: CLINIC | Age: 57
End: 2022-10-11

## 2022-10-11 NOTE — TELEPHONE ENCOUNTER
Pt scheduled for MRI on 10/14/2022  Pt was initially denied due to medical necessity and lack of PT  Please advise if referral request should be pursued due to Pt having PT since the initial denial or if there should be alternate imaging/ treatment provided  Please advise    PCP is out of the office   To be reviewed upon return

## 2023-01-16 DIAGNOSIS — E66.01 CLASS 2 SEVERE OBESITY DUE TO EXCESS CALORIES WITH SERIOUS COMORBIDITY AND BODY MASS INDEX (BMI) OF 35.0 TO 35.9 IN ADULT (HCC): ICD-10-CM

## 2023-01-17 RX ORDER — PHENTERMINE HYDROCHLORIDE 37.5 MG/1
37.5 TABLET ORAL DAILY
Qty: 30 TABLET | Refills: 0 | Status: SHIPPED | OUTPATIENT
Start: 2023-01-17

## 2023-01-17 NOTE — TELEPHONE ENCOUNTER
Medication:  PDMP   11/19/2022 11/18/2022 Phentermine Hcl (Tablet)  30 0 30 37 5 MG NA KINJAL SAMANIEGO     Active agreement on file -No

## 2023-01-18 ENCOUNTER — RA CDI HCC (OUTPATIENT)
Dept: OTHER | Facility: HOSPITAL | Age: 58
End: 2023-01-18

## 2023-01-18 NOTE — PROGRESS NOTES
[facility]  coding opportunities          Chart Reviewed number of suggestions sent to Provider: 1   J45 20 on problem list not on BPA    Patients Insurance        Commercial Insurance: [employer]

## 2023-01-20 ENCOUNTER — OFFICE VISIT (OUTPATIENT)
Dept: FAMILY MEDICINE CLINIC | Facility: CLINIC | Age: 58
End: 2023-01-20

## 2023-01-20 VITALS
DIASTOLIC BLOOD PRESSURE: 86 MMHG | TEMPERATURE: 97.1 F | WEIGHT: 246.2 LBS | HEART RATE: 92 BPM | RESPIRATION RATE: 16 BRPM | OXYGEN SATURATION: 98 % | SYSTOLIC BLOOD PRESSURE: 134 MMHG | BODY MASS INDEX: 38.64 KG/M2 | HEIGHT: 67 IN

## 2023-01-20 DIAGNOSIS — J45.20 ASTHMA, MILD INTERMITTENT, WELL-CONTROLLED: ICD-10-CM

## 2023-01-20 DIAGNOSIS — E66.01 CLASS 2 SEVERE OBESITY DUE TO EXCESS CALORIES WITH SERIOUS COMORBIDITY AND BODY MASS INDEX (BMI) OF 35.0 TO 35.9 IN ADULT (HCC): ICD-10-CM

## 2023-01-20 DIAGNOSIS — Z00.00 ANNUAL PHYSICAL EXAM: Primary | ICD-10-CM

## 2023-01-20 DIAGNOSIS — Z90.3 POSTGASTRECTOMY MALABSORPTION: ICD-10-CM

## 2023-01-20 DIAGNOSIS — E06.3 HYPOTHYROIDISM DUE TO HASHIMOTO'S THYROIDITIS: ICD-10-CM

## 2023-01-20 DIAGNOSIS — E03.8 HYPOTHYROIDISM DUE TO HASHIMOTO'S THYROIDITIS: ICD-10-CM

## 2023-01-20 DIAGNOSIS — K91.2 POSTGASTRECTOMY MALABSORPTION: ICD-10-CM

## 2023-01-20 DIAGNOSIS — Z12.31 ENCOUNTER FOR SCREENING MAMMOGRAM FOR MALIGNANT NEOPLASM OF BREAST: ICD-10-CM

## 2023-01-20 PROBLEM — Z12.4 ENCOUNTER FOR PAPANICOLAOU SMEAR FOR CERVICAL CANCER SCREENING: Status: RESOLVED | Noted: 2018-10-10 | Resolved: 2023-01-20

## 2023-01-20 PROBLEM — K44.9 PARAESOPHAGEAL HERNIA: Status: RESOLVED | Noted: 2018-10-17 | Resolved: 2023-01-20

## 2023-01-20 PROBLEM — R10.2 PELVIC PAIN: Status: RESOLVED | Noted: 2019-09-16 | Resolved: 2023-01-20

## 2023-01-20 RX ORDER — ALBUTEROL SULFATE 90 UG/1
2 AEROSOL, METERED RESPIRATORY (INHALATION) EVERY 6 HOURS PRN
Qty: 6.7 G | Refills: 5 | Status: SHIPPED | OUTPATIENT
Start: 2023-01-20 | End: 2023-02-01 | Stop reason: SDUPTHER

## 2023-01-20 NOTE — PROGRESS NOTES
[address] [facility] [facility] [address]    NAME: Junior Dale  AGE: 62 y o  SEX: female  : 1965     DATE: 2023     Assessment and Plan:     Problem List Items Addressed This Visit        Digestive    Postgastrectomy malabsorption    Relevant Orders    Comprehensive metabolic panel    CBC and differential    TSH, 3rd generation with Free T4 reflex    Lipid Panel with Direct LDL reflex       Endocrine    Hypothyroidism due to Hashimoto's thyroiditis    Relevant Orders    Comprehensive metabolic panel    CBC and differential    TSH, 3rd generation with Free T4 reflex    Lipid Panel with Direct LDL reflex       Respiratory    Asthma, well controlled    Relevant Medications    albuterol (Ventolin HFA) 90 mcg/act inhaler       Other    Class 2 severe obesity due to excess calories with serious comorbidity and body mass index (BMI) of 35 0 to 35 9 in [facility])    Relevant Orders    Comprehensive metabolic panel    CBC and differential    TSH, 3rd generation with Free T4 reflex    Lipid Panel with Direct LDL reflex   Other Visit Diagnoses     Annual physical exam    -  Primary    Encounter for screening mammogram for malignant neoplasm of breast        Relevant Orders    Mammo screening bilateral w cad          Immunizations and preventive care screenings were discussed with patient today  Appropriate education was printed on patient's after visit summary  Counseling:  Alcohol/drug use: discussed moderation in alcohol intake, the recommendations for healthy alcohol use, and avoidance of illicit drug use  Dental Health: discussed importance of regular tooth brushing, flossing, and dental visits  Injury prevention: discussed safety/seat belts, safety helmets, smoke detectors, carbon dioxide detectors, and smoking near bedding or upholstery    Sexual health: discussed sexually transmitted diseases, partner selection, use of condoms, avoidance of unintended pregnancy, and contraceptive alternatives  · Exercise: the importance of regular exercise/physical activity was discussed  Recommend exercise 3-5 times per week for at least 30 minutes  Return in about 6 months (around 7/20/2023) for Recheck  Chief Complaint:     Chief Complaint   Patient presents with   • Physical Exam     Patient being seen for physical       History of Present Illness:     Adult Annual Physical   Patient here for a comprehensive physical exam  The patient reports problems -   Having hand and feet coldness getting worse    Diet and Physical Activity  · Diet/Nutrition: well balanced diet  · Exercise: 1-2 times a week on average  Depression Screening  PHQ-2/9 Depression Screening         General Health  · Sleep: sleeps well  · Hearing: normal - bilateral   · Vision: no vision problems  · Dental: regular dental visits  /GYN Health  · Patient is: postmenopausal  ·      Review of Systems:     Review of Systems   Constitutional: Negative for fatigue and fever  HENT: Negative for congestion, postnasal drip and rhinorrhea  Eyes: Positive for visual disturbance  Negative for photophobia  Respiratory: Negative for cough, shortness of breath and wheezing  Cardiovascular: Negative for chest pain and palpitations  Gastrointestinal: Negative for constipation, diarrhea, nausea and vomiting  Genitourinary: Negative for dysuria and frequency  Musculoskeletal: Negative for arthralgias and myalgias  Skin: Negative for rash  Neurological: Negative for dizziness, light-headedness and headaches  Hematological: Negative for adenopathy  Psychiatric/Behavioral: Negative for dysphoric mood and sleep disturbance  The patient is not nervous/anxious         Past Medical History:     Past Medical History:   Diagnosis Date   • Anemia    • Anxiety    • Arthritis    • Asthma    • Bariatric surgery status    • Depression    • Disease of thyroid gland     hypothyroid   • Edema    • GERD (gastroesophageal reflux disease)    • History of anemia    • History of edema    • Hypertension    • Insomnia    • Morbid obesity ([facility] [address] )    • Obesity 1968   • Pneumonia Once in 2015   • Postgastrectomy malabsorption    • Spinal stenosis       Past Surgical History:     Past Surgical History:   Procedure Laterality Date   • ANKLE SURGERY Bilateral     tendon repair   • CARPAL TUNNEL RELEASE Bilateral    • CHOLECYSTECTOMY     • COLONOSCOPY     • GASTRIC BYPASS     • GASTRIC BYPASS LAPAROSCOPIC N/A 11/20/2018    Procedure: LAPAROSCOPIC REVISION OF MARBELLA-EN-Y BYPASS W/ROBOTICS & INTRAOP EGD;  Surgeon: Stacy Hilton MD;  Location: [facility];  Service: Bariatrics   • JOINT REPLACEMENT  2002, 2011    Left hip, right knee   • NEUROMA EXCISION      single Dewitt's neuroma   • SC EGD TRANSORAL BIOPSY SINGLE/MULTIPLE N/A 08/22/2018    Procedure: ESOPHAGOGASTRODUODENOSCOPY (EGD); Surgeon: Stacy Hilton MD;  Location: [facility]; Service: Bariatrics   • ROTATOR CUFF REPAIR      left   • SHOULDER SURGERY     • TONSILLECTOMY     • TOTAL HIP ARTHROPLASTY Left    • TOTAL KNEE ARTHROPLASTY Right       Social History:     Social History     Socioeconomic History   • Marital status: /Civil Union     Spouse name: None   • Number of children: None   • Years of education: None   • Highest education level: None   Occupational History   • None   Tobacco Use   • Smoking status: Never   • Smokeless tobacco: Never   Vaping Use   • Vaping Use: Never used   Substance and Sexual Activity   • Alcohol use:  Yes     Alcohol/week: 10 0 standard drinks     Types: 3 Glasses of wine, 7 Shots of liquor per week     Comment: social    • Drug use: No   • Sexual activity: Not Currently     Partners: Male     Birth control/protection: Post-menopausal   Other Topics Concern   • None   Social History Narrative   • None     Social Determinants of Health     Financial Resource Strain: Not on file   Food Insecurity: Not on file Transportation Needs: Not on file   Physical Activity: Not on file   Stress: Not on file   Social Connections: Not on file   Intimate Partner Violence: Not on file   Housing Stability: Not on file      Family History:     Family History   Problem Relation Age of Onset   • Hypertension Mother    • Other Mother         cardiac disorder   • Diabetes type II Mother         without complication, with long term use of insulin   • Depression Mother         Treated with Zoloft   • Heart disease Mother    • Diabetes Mother    • Thyroid disease Mother    • COPD Mother    • Arthritis Mother    • Anxiety disorder Mother    • Depression Father         Suicided in 200   • Anxiety disorder Father    • Completed Suicide  Father    • No Known Problems Maternal Grandmother    • No Known Problems Maternal Grandfather    • Arthritis Paternal Grandmother    • No Known Problems Paternal Grandfather    • No Known Problems Maternal Aunt    • No Known Problems Maternal Aunt    • No Known Problems Maternal Aunt    • Arthritis Cousin       Current Medications:     Current Outpatient Medications   Medication Sig Dispense Refill   • albuterol (Ventolin HFA) 90 mcg/act inhaler Inhale 2 puffs every 6 (six) hours as needed for wheezing 6 7 g 5   • busPIRone (BUSPAR) 7 5 mg tablet TAKE 1 TABLET BY MOUTH 2 TIMES A  tablet 1   • escitalopram (LEXAPRO) 20 mg tablet Take 1 tablet (20 mg total) by mouth daily 30 tablet 5   • levothyroxine 50 mcg tablet Take 1 tablet by mouth daily 30 tablet 5   • lisinopril (ZESTRIL) 20 mg tablet Take 1 tablet by mouth twice daily 180 tablet 1   • LORazepam (ATIVAN) 1 mg tablet Take 1 tablet (1 mg total) by mouth 2 (two) times a day as needed for anxiety 60 tablet 3   • pantoprazole (PROTONIX) 40 mg tablet Take 1 tablet by mouth once daily 90 tablet 1   • phentermine (ADIPEX-P) 37 5 MG tablet Take 1 tablet (37 5 mg total) by mouth daily 30 tablet 0   • spironolactone (ALDACTONE) 50 mg tablet Take 1 tablet by mouth once daily 90 tablet 3   • temazepam (RESTORIL) 15 mg capsule TAKE 1 TO 2 CAPSULES BY MOUTH AT BEDTIME AS NEEDED FOR INSOMNIA 60 capsule 5   • Diclofenac Sodium (VOLTAREN) 1 % Apply 2 g topically 4 (four) times a day (Patient not taking: Reported on 1/20/2023) 50 g 1     No current facility-administered medications for this visit  Allergies: Allergies   Allergen Reactions   • Amoxicillin Confusion, Delirium, Dizziness, Hives, Itching, Lightheadedness, Other (See Comments), Photosensitivity, Syncope and Visual Disturbance   • Iodine - Food Allergy      IV dye  Uncontrolled sneezing     • Medical Tape Hives     Steri strips    • Meperidine    • Sulfa Antibiotics Hives   • Sulfate       Physical Exam:     /86 (BP Location: Left arm, Patient Position: Sitting, Cuff Size: Large)   Pulse 92   Temp (!) 97 1 °F (36 2 °C) (Tympanic)   Resp 16   Ht 5' 7 32" (1 71 m)   Wt 112 kg (246 lb 3 2 oz)   SpO2 98%   BMI 38 19 kg/m²     Physical Exam  Vitals and nursing note reviewed  Constitutional:       Appearance: Normal appearance  She is obese  HENT:      Head: Normocephalic and atraumatic  Right Ear: Tympanic membrane, ear canal and external ear normal       Left Ear: Tympanic membrane, ear canal and external ear normal       Nose: Nose normal       Mouth/Throat:      Mouth: Mucous membranes are moist       Pharynx: Oropharynx is clear  Eyes:      Extraocular Movements: Extraocular movements intact  Conjunctiva/sclera: Conjunctivae normal    Cardiovascular:      Rate and Rhythm: Normal rate and regular rhythm  Pulses: Normal pulses  Heart sounds: Normal heart sounds  Pulmonary:      Effort: Pulmonary effort is normal       Breath sounds: Normal breath sounds  Abdominal:      General: Bowel sounds are normal    Musculoskeletal:         General: Normal range of motion  Cervical back: Normal range of motion and neck supple  Skin:     General: Skin is warm and dry  Capillary Refill: Capillary refill takes less than 2 seconds  Neurological:      General: No focal deficit present  Mental Status: She is alert and oriented to person, place, and time  Psychiatric:         Mood and Affect: Mood normal          Behavior: Behavior normal          Thought Content:  Thought content normal          Judgment: Judgment normal           Moriah Lopez, [address] [place] [address],[facility] [address]

## 2023-01-20 NOTE — PATIENT INSTRUCTIONS

## 2023-02-01 DIAGNOSIS — I73.00 RAYNAUD'S PHENOMENON WITHOUT GANGRENE: Primary | ICD-10-CM

## 2023-02-01 DIAGNOSIS — J45.20 ASTHMA, MILD INTERMITTENT, WELL-CONTROLLED: ICD-10-CM

## 2023-02-01 RX ORDER — AMLODIPINE BESYLATE 5 MG/1
5 TABLET ORAL DAILY
Qty: 30 TABLET | Refills: 3 | Status: SHIPPED | OUTPATIENT
Start: 2023-02-01 | End: 2023-02-01 | Stop reason: SDUPTHER

## 2023-02-01 RX ORDER — ALBUTEROL SULFATE 90 UG/1
2 AEROSOL, METERED RESPIRATORY (INHALATION) EVERY 6 HOURS PRN
Qty: 18 G | Refills: 5 | Status: SHIPPED | OUTPATIENT
Start: 2023-02-01

## 2023-02-01 RX ORDER — AMLODIPINE BESYLATE 10 MG/1
10 TABLET ORAL DAILY
Qty: 30 TABLET | Refills: 5 | Status: SHIPPED | OUTPATIENT
Start: 2023-02-01 | End: 2023-03-20

## 2023-02-07 DIAGNOSIS — F51.01 PRIMARY INSOMNIA: ICD-10-CM

## 2023-02-08 RX ORDER — TEMAZEPAM 15 MG/1
CAPSULE ORAL
Qty: 60 CAPSULE | Refills: 3 | Status: SHIPPED | OUTPATIENT
Start: 2023-02-08

## 2023-02-13 DIAGNOSIS — E66.01 CLASS 2 SEVERE OBESITY DUE TO EXCESS CALORIES WITH SERIOUS COMORBIDITY AND BODY MASS INDEX (BMI) OF 35.0 TO 35.9 IN ADULT (HCC): ICD-10-CM

## 2023-02-14 RX ORDER — PHENTERMINE HYDROCHLORIDE 37.5 MG/1
TABLET ORAL
Qty: 30 TABLET | Refills: 0 | Status: SHIPPED | OUTPATIENT
Start: 2023-02-14

## 2023-03-05 DIAGNOSIS — E66.01 CLASS 2 SEVERE OBESITY DUE TO EXCESS CALORIES WITH SERIOUS COMORBIDITY AND BODY MASS INDEX (BMI) OF 35.0 TO 35.9 IN ADULT (HCC): ICD-10-CM

## 2023-03-06 RX ORDER — PHENTERMINE HYDROCHLORIDE 37.5 MG/1
TABLET ORAL
Qty: 30 TABLET | Refills: 0 | Status: SHIPPED | OUTPATIENT
Start: 2023-03-06 | End: 2023-03-15

## 2023-03-14 DIAGNOSIS — F41.9 ANXIETY: ICD-10-CM

## 2023-03-14 DIAGNOSIS — E66.01 CLASS 2 SEVERE OBESITY DUE TO EXCESS CALORIES WITH SERIOUS COMORBIDITY AND BODY MASS INDEX (BMI) OF 35.0 TO 35.9 IN ADULT (HCC): ICD-10-CM

## 2023-03-15 RX ORDER — LORAZEPAM 1 MG/1
TABLET ORAL
Qty: 60 TABLET | Refills: 3 | Status: SHIPPED | OUTPATIENT
Start: 2023-03-15

## 2023-03-15 RX ORDER — PHENTERMINE HYDROCHLORIDE 37.5 MG/1
TABLET ORAL
Qty: 30 TABLET | Refills: 3 | Status: SHIPPED | OUTPATIENT
Start: 2023-03-15

## 2023-03-20 DIAGNOSIS — I10 ESSENTIAL HYPERTENSION: Primary | ICD-10-CM

## 2023-03-20 RX ORDER — OLMESARTAN MEDOXOMIL 20 MG/1
20 TABLET ORAL DAILY
Qty: 30 TABLET | Refills: 5 | Status: SHIPPED | OUTPATIENT
Start: 2023-03-20

## 2023-03-31 ENCOUNTER — OFFICE VISIT (OUTPATIENT)
Dept: URGENT CARE | Facility: CLINIC | Age: 58
End: 2023-03-31

## 2023-03-31 VITALS
RESPIRATION RATE: 18 BRPM | HEART RATE: 73 BPM | SYSTOLIC BLOOD PRESSURE: 132 MMHG | TEMPERATURE: 96 F | OXYGEN SATURATION: 100 % | DIASTOLIC BLOOD PRESSURE: 84 MMHG

## 2023-03-31 DIAGNOSIS — M79.674 PAIN IN TOE OF RIGHT FOOT: Primary | ICD-10-CM

## 2023-03-31 NOTE — PROGRESS NOTES
[facility]    NAME: Kirstin Quintanilla is a 62 y o  female  : 1965    MRN: [id number]  DATE: 2023  TIME: 3:21 PM    Assessment and Plan   Pain in toe of right foot [M79 [id number]]  1  Pain in toe of right foot            Patient Instructions   Patient Instructions   Warm Epsom salt soaks 5 to 10 minutes twice a day for the next 3 to 5 days  May get moleskin blister pad to apply over site to help offload pressure  Recommend document area with photo on smart phone  Have lab work done and make follow-up with your primary care provider for reevaluation  Chief Complaint     Chief Complaint   Patient presents with   • Toe Pain     Pt C/O right second toe blister that was noticed over the weekend and now is painful with ambulation  History of Present Illness   Kirstin Quintanilla presents to the clinic c/o  78-year-old female comes in with complaint of toe pain, right second toe  Started over the weekend  Hurts to touch or to walk on  Has not done anything except come here for further evaluation  Her big concern is that she watched and help care for her mother as her mother lost her toe foot leg due to wound on toe and complications of diabetes  Scares her to death  She denies any personal diabetes at this time  She denies any difficulty with neuropathy in her feet at this time  Does have history of Raynaud's  Denies any known injury or recent unusual walking  Does go around in her stocking feet  She is scheduled to have lab work done and make a follow-up with her primary care provider  She has not done that yet  Review of Systems   Review of Systems   Constitutional: Negative  Skin: Positive for color change         Current Medications     Long-Term Medications   Medication Sig Dispense Refill   • busPIRone (BUSPAR) 7 5 mg tablet TAKE 1 TABLET BY MOUTH 2 TIMES A  tablet 1   • escitalopram (LEXAPRO) 20 mg tablet Take 1 tablet (20 mg total) by mouth daily 30 tablet 5   • levothyroxine 50 mcg tablet Take 1 tablet by mouth daily 30 tablet 5   • LORazepam (ATIVAN) 1 mg tablet TAKE ONE TABLET BY MOUTH TWICE DAILY AS NEEDED FOR ANXIETY 60 tablet 3   • olmesartan (BENICAR) 20 mg tablet Take 1 tablet (20 mg total) by mouth daily 30 tablet 5   • pantoprazole (PROTONIX) 40 mg tablet Take 1 tablet by mouth once daily 90 tablet 1   • phentermine (ADIPEX-P) 37 5 MG tablet TAKE 1 TABLET BY MOUTH DAILY 30 tablet 3   • spironolactone (ALDACTONE) 50 mg tablet Take 1 tablet by mouth once daily 90 tablet 3   • temazepam (RESTORIL) 15 mg capsule TAKE ONE OR TWO CAPSULES BY MOUTH AT BEDTIME AS NEEDED FOR INSOMNIA 60 capsule 3   • Diclofenac Sodium (VOLTAREN) 1 % Apply 2 g topically 4 (four) times a day (Patient not taking: Reported on 1/20/2023) 50 g 1       Current Allergies     Allergies as of 03/31/2023 - Reviewed 03/31/2023   Allergen Reaction Noted   • Amoxicillin Confusion, Delirium, Dizziness, Hives, Itching, Lightheadedness, Other (See Comments), Photosensitivity, Syncope, and Visual Disturbance 04/04/2020   • Iodine - food allergy  01/12/2018   • Medical tape Hives 01/12/2018   • Meperidine  01/12/2018   • Sulfa antibiotics Hives 04/17/2019   • Sulfate  01/12/2018   • Amlodipine Edema 03/20/2023   • Lisinopril Cough 03/20/2023          The following portions of the patient's history were reviewed and updated as appropriate: allergies, current medications, past family history, past medical history, past social history, past surgical history and problem list   Past Medical History:   Diagnosis Date   • Anemia    • Anxiety    • Arthritis    • Asthma    • Bariatric surgery status    • Depression    • Disease of thyroid gland     hypothyroid   • Edema    • GERD (gastroesophageal reflux disease)    • History of anemia    • History of edema    • Hypertension    • Insomnia    • Morbid obesity ([facility] [address] )    • Obesity 1968   • Pneumonia Once in 2015   • Postgastrectomy malabsorption    • Spinal stenosis      Past Surgical History:   Procedure Laterality Date   • ANKLE SURGERY Bilateral     tendon repair   • CARPAL TUNNEL RELEASE Bilateral    • CHOLECYSTECTOMY     • COLONOSCOPY     • GASTRIC BYPASS     • GASTRIC BYPASS LAPAROSCOPIC N/A 11/20/2018    Procedure: LAPAROSCOPIC REVISION OF MARBELLA-EN-Y BYPASS W/ROBOTICS & INTRAOP EGD;  Surgeon: Lauro Salazar MD;  Location: [facility];  Service: Bariatrics   • JOINT REPLACEMENT  2002, 2011    Left hip, right knee   • NEUROMA EXCISION      single Dewitt's neuroma   • [place] EGD TRANSORAL BIOPSY SINGLE/MULTIPLE N/A 08/22/2018    Procedure: ESOPHAGOGASTRODUODENOSCOPY (EGD); Surgeon: Lauro Salazar MD;  Location: [facility]; Service: Bariatrics   • ROTATOR CUFF REPAIR      left   • SHOULDER SURGERY     • TONSILLECTOMY     • TOTAL HIP ARTHROPLASTY Left    • TOTAL KNEE ARTHROPLASTY Right      Family History   Problem Relation Age of Onset   • Hypertension Mother    • Other Mother         cardiac disorder   • Diabetes type II Mother         without complication, with long term use of insulin   • Depression Mother         Treated with Zoloft   • Heart disease Mother    • Diabetes Mother    • Thyroid disease Mother    • COPD Mother    • Arthritis Mother    • Anxiety disorder Mother    • Depression Father         Suicided in 200   • Anxiety disorder Father    • Completed Suicide  Father    • No Known Problems Maternal Grandmother    • No Known Problems Maternal Grandfather    • Arthritis Paternal Grandmother    • No Known Problems Paternal Grandfather    • No Known Problems Maternal Aunt    • No Known Problems Maternal Aunt    • No Known Problems Maternal Aunt    • Arthritis Cousin        Objective   /84 (BP Location: Left arm, Patient Position: Sitting, Cuff Size: Large)   Pulse 73   Temp (!) 96 °F (35 6 °C) (Tympanic)   Resp 18   SpO2 100%   No LMP recorded  Patient is postmenopausal        Physical Exam     Physical Exam  Vitals and nursing note reviewed  Constitutional:       General: She is not in acute distress  Appearance: She is well-developed  She is not ill-appearing, toxic-appearing or diaphoretic  Comments: Slightly anxious female accompanied by her spouse  Cardiovascular:      Rate and Rhythm: Normal rate  Pulmonary:      Effort: Pulmonary effort is normal  No respiratory distress  Musculoskeletal:      Right lower leg: No edema  Left lower leg: No edema  Skin:     General: Skin is warm and dry  Capillary Refill: Capillary refill takes less than 2 seconds  Good pedal pulses and capillary refill less than 2 seconds distal toes  Findings: Lesion present  Comments: Plantar surface second right toe over distal phalangeal pad with 2 mm blister like lesion with mild TTP  Minimal redness  No evidence of foreign body  Neurological:      Mental Status: She is alert and oriented to person, place, and time     Psychiatric:         Mood and Affect: Mood normal          Behavior: Behavior normal

## 2023-03-31 NOTE — PATIENT INSTRUCTIONS
Warm Epsom salt soaks 5 to 10 minutes twice a day for the next 3 to 5 days  May get moleskin blister pad to apply over site to help offload pressure  Recommend document area with photo on smart phone  Have lab work done and make follow-up with your primary care provider for reevaluation

## 2023-04-24 DIAGNOSIS — F32.A ANXIETY AND DEPRESSION: ICD-10-CM

## 2023-04-24 DIAGNOSIS — E03.8 HYPOTHYROIDISM DUE TO HASHIMOTO'S THYROIDITIS: ICD-10-CM

## 2023-04-24 DIAGNOSIS — E06.3 HYPOTHYROIDISM DUE TO HASHIMOTO'S THYROIDITIS: ICD-10-CM

## 2023-04-24 DIAGNOSIS — F41.9 ANXIETY AND DEPRESSION: ICD-10-CM

## 2023-04-24 RX ORDER — BUSPIRONE HYDROCHLORIDE 7.5 MG/1
TABLET ORAL
Qty: 180 TABLET | Refills: 3 | Status: SHIPPED | OUTPATIENT
Start: 2023-04-24

## 2023-04-24 RX ORDER — LEVOTHYROXINE SODIUM 0.05 MG/1
50 TABLET ORAL DAILY
Qty: 30 TABLET | Refills: 5 | Status: SHIPPED | OUTPATIENT
Start: 2023-04-24

## 2023-05-05 DIAGNOSIS — F41.9 ANXIETY: ICD-10-CM

## 2023-05-05 DIAGNOSIS — L70.0 CYSTIC ACNE: ICD-10-CM

## 2023-05-05 DIAGNOSIS — K21.9 GASTROESOPHAGEAL REFLUX DISEASE WITHOUT ESOPHAGITIS: ICD-10-CM

## 2023-05-05 RX ORDER — ESCITALOPRAM OXALATE 20 MG/1
TABLET ORAL
Qty: 30 TABLET | Refills: 0 | Status: SHIPPED | OUTPATIENT
Start: 2023-05-05

## 2023-05-05 RX ORDER — SPIRONOLACTONE 50 MG/1
TABLET, FILM COATED ORAL
Qty: 30 TABLET | Refills: 3 | Status: SHIPPED | OUTPATIENT
Start: 2023-05-05

## 2023-05-05 RX ORDER — PANTOPRAZOLE SODIUM 40 MG/1
TABLET, DELAYED RELEASE ORAL
Qty: 120 TABLET | Refills: 0 | Status: SHIPPED | OUTPATIENT
Start: 2023-05-05

## 2023-06-09 DIAGNOSIS — F41.9 ANXIETY: ICD-10-CM

## 2023-06-09 DIAGNOSIS — K21.9 GASTROESOPHAGEAL REFLUX DISEASE WITHOUT ESOPHAGITIS: ICD-10-CM

## 2023-06-09 RX ORDER — PANTOPRAZOLE SODIUM 40 MG/1
TABLET, DELAYED RELEASE ORAL
Qty: 120 TABLET | Refills: 0 | Status: SHIPPED | OUTPATIENT
Start: 2023-06-09

## 2023-06-09 RX ORDER — ESCITALOPRAM OXALATE 20 MG/1
TABLET ORAL
Qty: 30 TABLET | Refills: 0 | Status: SHIPPED | OUTPATIENT
Start: 2023-06-09

## 2023-07-06 DIAGNOSIS — F51.01 PRIMARY INSOMNIA: ICD-10-CM

## 2023-07-09 DIAGNOSIS — F41.9 ANXIETY: ICD-10-CM

## 2023-07-09 RX ORDER — ESCITALOPRAM OXALATE 20 MG/1
TABLET ORAL
Qty: 30 TABLET | Refills: 0 | Status: SHIPPED | OUTPATIENT
Start: 2023-07-09

## 2023-07-10 RX ORDER — TEMAZEPAM 15 MG/1
CAPSULE ORAL
Qty: 60 CAPSULE | Refills: 3 | Status: SHIPPED | OUTPATIENT
Start: 2023-07-10

## 2023-08-08 DIAGNOSIS — F41.9 ANXIETY: ICD-10-CM

## 2023-08-08 RX ORDER — ESCITALOPRAM OXALATE 20 MG/1
TABLET ORAL
Qty: 30 TABLET | Refills: 0 | Status: SHIPPED | OUTPATIENT
Start: 2023-08-08 | End: 2023-08-09 | Stop reason: SDUPTHER

## 2023-08-09 DIAGNOSIS — F41.9 ANXIETY: ICD-10-CM

## 2023-08-09 RX ORDER — ESCITALOPRAM OXALATE 20 MG/1
20 TABLET ORAL DAILY
Qty: 30 TABLET | Refills: 5 | Status: SHIPPED | OUTPATIENT
Start: 2023-08-09

## 2023-08-24 DIAGNOSIS — E66.01 CLASS 2 SEVERE OBESITY DUE TO EXCESS CALORIES WITH SERIOUS COMORBIDITY AND BODY MASS INDEX (BMI) OF 35.0 TO 35.9 IN ADULT (HCC): ICD-10-CM

## 2023-08-25 RX ORDER — PHENTERMINE HYDROCHLORIDE 37.5 MG/1
TABLET ORAL
Qty: 30 TABLET | Refills: 0 | Status: SHIPPED | OUTPATIENT
Start: 2023-08-25

## 2023-09-06 DIAGNOSIS — L70.0 CYSTIC ACNE: ICD-10-CM

## 2023-09-07 RX ORDER — SPIRONOLACTONE 50 MG/1
TABLET, FILM COATED ORAL
Qty: 30 TABLET | Refills: 5 | Status: SHIPPED | OUTPATIENT
Start: 2023-09-07

## 2023-09-26 DIAGNOSIS — E66.01 CLASS 2 SEVERE OBESITY DUE TO EXCESS CALORIES WITH SERIOUS COMORBIDITY AND BODY MASS INDEX (BMI) OF 35.0 TO 35.9 IN ADULT: ICD-10-CM

## 2023-09-26 DIAGNOSIS — I10 ESSENTIAL HYPERTENSION: ICD-10-CM

## 2023-09-27 RX ORDER — OLMESARTAN MEDOXOMIL 20 MG/1
20 TABLET ORAL DAILY
Qty: 30 TABLET | Refills: 3 | Status: SHIPPED | OUTPATIENT
Start: 2023-09-27

## 2023-09-27 RX ORDER — PHENTERMINE HYDROCHLORIDE 37.5 MG/1
TABLET ORAL
Qty: 30 TABLET | Refills: 3 | Status: SHIPPED | OUTPATIENT
Start: 2023-09-27

## 2023-10-31 DIAGNOSIS — F41.9 ANXIETY: ICD-10-CM

## 2023-10-31 DIAGNOSIS — J45.20 ASTHMA, MILD INTERMITTENT, WELL-CONTROLLED: ICD-10-CM

## 2023-10-31 DIAGNOSIS — E06.3 HYPOTHYROIDISM DUE TO HASHIMOTO'S THYROIDITIS: ICD-10-CM

## 2023-10-31 DIAGNOSIS — E03.8 HYPOTHYROIDISM DUE TO HASHIMOTO'S THYROIDITIS: ICD-10-CM

## 2023-11-01 RX ORDER — LEVOTHYROXINE SODIUM 0.05 MG/1
50 TABLET ORAL DAILY
Qty: 30 TABLET | Refills: 0 | Status: SHIPPED | OUTPATIENT
Start: 2023-11-01

## 2023-11-01 RX ORDER — ALBUTEROL SULFATE 90 UG/1
2 AEROSOL, METERED RESPIRATORY (INHALATION) EVERY 6 HOURS PRN
Qty: 18 G | Refills: 0 | Status: SHIPPED | OUTPATIENT
Start: 2023-11-01

## 2023-11-01 NOTE — TELEPHONE ENCOUNTER
Medication:  PDMP     09/07/2023 03/15/2023 LORazepam 1 MG TABLET (non-liquid) 60.0 30 1 MG NA KINJAL SAMANIEGO     Active agreement on file -No

## 2023-11-02 RX ORDER — LORAZEPAM 1 MG/1
1 TABLET ORAL 2 TIMES DAILY PRN
Qty: 60 TABLET | Refills: 3 | Status: SHIPPED | OUTPATIENT
Start: 2023-11-02

## 2023-11-21 DIAGNOSIS — F51.01 PRIMARY INSOMNIA: ICD-10-CM

## 2023-11-22 RX ORDER — TEMAZEPAM 15 MG/1
CAPSULE ORAL
Qty: 60 CAPSULE | Refills: 3 | Status: SHIPPED | OUTPATIENT
Start: 2023-11-22

## 2023-11-28 DIAGNOSIS — E03.8 HYPOTHYROIDISM DUE TO HASHIMOTO'S THYROIDITIS: ICD-10-CM

## 2023-11-28 DIAGNOSIS — E06.3 HYPOTHYROIDISM DUE TO HASHIMOTO'S THYROIDITIS: ICD-10-CM

## 2023-11-29 RX ORDER — LEVOTHYROXINE SODIUM 0.05 MG/1
50 TABLET ORAL DAILY
Qty: 30 TABLET | Refills: 0 | Status: SHIPPED | OUTPATIENT
Start: 2023-11-29

## 2023-12-24 DIAGNOSIS — E06.3 HYPOTHYROIDISM DUE TO HASHIMOTO'S THYROIDITIS: ICD-10-CM

## 2023-12-24 DIAGNOSIS — E03.8 HYPOTHYROIDISM DUE TO HASHIMOTO'S THYROIDITIS: ICD-10-CM

## 2023-12-27 RX ORDER — LEVOTHYROXINE SODIUM 0.05 MG/1
50 TABLET ORAL DAILY
Qty: 30 TABLET | Refills: 5 | Status: SHIPPED | OUTPATIENT
Start: 2023-12-27

## 2024-01-19 ENCOUNTER — RA CDI HCC (OUTPATIENT)
Dept: OTHER | Facility: HOSPITAL | Age: 59
End: 2024-01-19

## 2024-01-19 NOTE — PROGRESS NOTES
HCC coding opportunities       Chart reviewed, no opportunity found: CHART REVIEWED, NO OPPORTUNITY FOUND      This is a reminder to address (resolve/update/assess) ALL HCC (risk adjustment) codes as found on active problem list for 2024 as patient scores reset to zero JAVIER.  Also, just a reminder to please review and assess all other chronic conditions for 2024  Patients Insurance        Commercial Insurance: [employer]

## 2024-01-24 DIAGNOSIS — K21.9 GASTROESOPHAGEAL REFLUX DISEASE WITHOUT ESOPHAGITIS: ICD-10-CM

## 2024-01-25 RX ORDER — PANTOPRAZOLE SODIUM 40 MG/1
TABLET, DELAYED RELEASE ORAL
Qty: 30 TABLET | Refills: 0 | Status: SHIPPED | OUTPATIENT
Start: 2024-01-25

## 2024-01-26 ENCOUNTER — OFFICE VISIT (OUTPATIENT)
Dept: FAMILY MEDICINE CLINIC | Facility: CLINIC | Age: 59
End: 2024-01-26
Payer: COMMERCIAL

## 2024-01-26 VITALS
TEMPERATURE: 97.9 F | HEART RATE: 91 BPM | SYSTOLIC BLOOD PRESSURE: 120 MMHG | DIASTOLIC BLOOD PRESSURE: 86 MMHG | OXYGEN SATURATION: 98 % | RESPIRATION RATE: 18 BRPM

## 2024-01-26 DIAGNOSIS — L98.491 CHRONIC SKIN ULCER, LIMITED TO BREAKDOWN OF SKIN (HCC): ICD-10-CM

## 2024-01-26 DIAGNOSIS — E66.01 CLASS 2 SEVERE OBESITY DUE TO EXCESS CALORIES WITH SERIOUS COMORBIDITY AND BODY MASS INDEX (BMI) OF 35.0 TO 35.9 IN ADULT: ICD-10-CM

## 2024-01-26 DIAGNOSIS — E06.3 HYPOTHYROIDISM DUE TO HASHIMOTO'S THYROIDITIS: ICD-10-CM

## 2024-01-26 DIAGNOSIS — Z00.00 ANNUAL PHYSICAL EXAM: Primary | ICD-10-CM

## 2024-01-26 DIAGNOSIS — I10 ESSENTIAL HYPERTENSION: ICD-10-CM

## 2024-01-26 DIAGNOSIS — Z12.31 ENCOUNTER FOR SCREENING MAMMOGRAM FOR MALIGNANT NEOPLASM OF BREAST: ICD-10-CM

## 2024-01-26 DIAGNOSIS — E03.8 HYPOTHYROIDISM DUE TO HASHIMOTO'S THYROIDITIS: ICD-10-CM

## 2024-01-26 DIAGNOSIS — G89.29 CHRONIC PAIN OF LEFT KNEE: ICD-10-CM

## 2024-01-26 DIAGNOSIS — M25.562 CHRONIC PAIN OF LEFT KNEE: ICD-10-CM

## 2024-01-26 PROCEDURE — 99396 PREV VISIT EST AGE 40-64: CPT | Performed by: NURSE PRACTITIONER

## 2024-01-26 RX ORDER — AZELAIC ACID 0.15 G/G
AEROSOL, FOAM TOPICAL
COMMUNITY
Start: 2023-11-01

## 2024-01-26 NOTE — PROGRESS NOTES
ADULT ANNUAL PHYSICAL  [facility]    NAME: Gris Salmeron  AGE: 58 y.o. SEX: female  : 1965     DATE: 2024     Assessment and Plan:     Problem List Items Addressed This Visit          Endocrine    Hypothyroidism due to Hashimoto's thyroiditis    Relevant Orders    Comprehensive metabolic panel    CBC and differential    TSH, 3rd generation with Free T4 reflex    Lipid Panel with Direct LDL reflex       Cardiovascular and Mediastinum    Essential hypertension    Relevant Orders    Comprehensive metabolic panel    CBC and differential    TSH, 3rd generation with Free T4 reflex    Lipid Panel with Direct LDL reflex       Other    Class 2 severe obesity due to excess calories with serious comorbidity and body mass index (BMI) of 35.0 to 35.9 in adult     Relevant Orders    Comprehensive metabolic panel    CBC and differential    TSH, 3rd generation with Free T4 reflex    Lipid Panel with Direct LDL reflex     Other Visit Diagnoses       Annual physical exam    -  Primary    Encounter for screening mammogram for malignant neoplasm of breast        Relevant Orders    Mammo screening bilateral w cad    Chronic pain of left knee        Relevant Orders    Ambulatory Referral to Orthopedic Surgery    Comprehensive metabolic panel    CBC and differential    TSH, 3rd generation with Free T4 reflex    Lipid Panel with Direct LDL reflex    Chronic skin ulcer, limited to breakdown of skin (HCC)        Relevant Medications    Finacea 15 % FOAM    Other Relevant Orders    Ambulatory Referral to Dermatology              Immunizations and preventive care screenings were discussed with patient today. Appropriate education was printed on patient's after visit summary.    Counseling:  Alcohol/drug use: discussed moderation in alcohol intake, the recommendations for healthy alcohol use, and avoidance of illicit drug use.  Dental Health: discussed importance of regular  tooth brushing, flossing, and dental visits.  Injury prevention: discussed safety/seat belts, safety helmets, smoke detectors, carbon dioxide detectors, and smoking near bedding or upholstery.  Sexual health: discussed sexually transmitted diseases, partner selection, use of condoms, avoidance of unintended pregnancy, and contraceptive alternatives.  Exercise: the importance of regular exercise/physical activity was discussed. Recommend exercise 3-5 times per week for at least 30 minutes.       Depression Screening and Follow-up Plan: Patient's depression screening was positive with a PHQ-9 score of 8. Patient assessed for underlying major depression. Brief counseling provided and recommend additional follow-up/re-evaluation next office visit.         No follow-ups on file.     Chief Complaint:     Chief Complaint   Patient presents with    Physical Exam     Pt been seeing for Physical exam      History of Present Illness:     Adult Annual Physical   Patient here for a comprehensive physical exam. The patient reports no problems.    Diet and Physical Activity  Diet/Nutrition: well balanced diet.   Exercise: no formal exercise.      Depression Screening  PHQ-2/9 Depression Screening    Little interest or pleasure in doing things: 1 - several days  Feeling down, depressed, or hopeless: 2 - more than half the days  Trouble falling or staying asleep, or sleeping too much: 0 - not at all  Feeling tired or having little energy: 2 - more than half the days  Poor appetite or overeatin - not at all  Feeling bad about yourself - or that you are a failure or have let yourself or your family down: 2 - more than half the days  Trouble concentrating on things, such as reading the newspaper or watching television: 1 - several days  Moving or speaking so slowly that other people could have noticed. Or the opposite - being so fidgety or restless that you have been moving around a lot more than usual: 0 - not at all  Thoughts  that you would be better off dead, or of hurting yourself in some way: 0 - not at all  PHQ-9 Score: 8  PHQ-9 Interpretation: Mild depression       General Health  Sleep: sleeps well.   Hearing: normal - bilateral.  Vision: no vision problems.   Dental: regular dental visits.       /GYN Health  Follows with gynecology? yes   Patient is: postmenopausal  .    Advanced Care Planning  Do you have an advanced directive? yes  Do you have a durable medical power of ? yes     Review of Systems:     Review of Systems   Constitutional:  Negative for fatigue and fever.   HENT:  Negative for congestion, postnasal drip and rhinorrhea.    Eyes:  Negative for photophobia and visual disturbance.   Respiratory:  Negative for cough, shortness of breath and wheezing.    Cardiovascular:  Negative for chest pain and palpitations.   Gastrointestinal:  Negative for constipation, diarrhea, nausea and vomiting.   Genitourinary:  Negative for dysuria and frequency.   Musculoskeletal:  Negative for arthralgias.   Skin:  Negative for rash.   Neurological:  Negative for dizziness, light-headedness and headaches.   Hematological:  Negative for adenopathy.   Psychiatric/Behavioral:  Negative for dysphoric mood and sleep disturbance. The patient is not nervous/anxious.       Past Medical History:     Past Medical History:   Diagnosis Date    Anemia     Anxiety     Arthritis     Asthma     Bariatric surgery status     Depression     Disease of thyroid gland     hypothyroid    Edema     GERD (gastroesophageal reflux disease)     History of anemia     History of edema     Hypertension     Insomnia     Morbid obesity (HCC)     Obesity 1968    Pneumonia Once in 2015    Postgastrectomy malabsorption     Spinal stenosis       Past Surgical History:     Past Surgical History:   Procedure Laterality Date    ANKLE SURGERY Bilateral     tendon repair    CARPAL TUNNEL RELEASE Bilateral     CHOLECYSTECTOMY      COLONOSCOPY      GASTRIC BYPASS       GASTRIC BYPASS LAPAROSCOPIC N/A 11/20/2018    Procedure: LAPAROSCOPIC REVISION OF MARBELLA-EN-Y BYPASS W/ROBOTICS & INTRAOP EGD;  Surgeon: Jackson Villalba MD;  Location: [facility];  Service: Bariatrics    JOINT REPLACEMENT  2002, 2011    Left hip, right knee    NEUROMA EXCISION      single Dewitt's neuroma    RI EGD TRANSORAL BIOPSY SINGLE/MULTIPLE N/A 08/22/2018    Procedure: ESOPHAGOGASTRODUODENOSCOPY (EGD);  Surgeon: Jackson Villalba MD;  Location: [facility];  Service: Bariatrics    ROTATOR CUFF REPAIR      left    SHOULDER SURGERY      TONSILLECTOMY      TOTAL HIP ARTHROPLASTY Left     TOTAL KNEE ARTHROPLASTY Right       Social History:     Social History     Socioeconomic History    Marital status: /Civil Union     Spouse name: None    Number of children: None    Years of education: None    Highest education level: None   Occupational History    None   Tobacco Use    Smoking status: Never    Smokeless tobacco: Never   Vaping Use    Vaping status: Never Used   Substance and Sexual Activity    Alcohol use: Yes     Alcohol/week: 10.0 standard drinks of alcohol     Types: 3 Glasses of wine, 7 Shots of liquor per week     Comment: social     Drug use: No    Sexual activity: Not Currently     Partners: Male     Birth control/protection: Post-menopausal   Other Topics Concern    None   Social History Narrative    None     Social Determinants of Health     Financial Resource Strain: Not on file   Food Insecurity: Not on file   Transportation Needs: Not on file   Physical Activity: Not on file   Stress: Not on file   Social Connections: Not on file   Intimate Partner Violence: Not on file   Housing Stability: Not on file      Family History:     Family History   Problem Relation Age of Onset    Hypertension Mother     Other Mother         cardiac disorder    Diabetes type II Mother         without complication, with long term use of insulin    Depression Mother         Treated with Zoloft    Heart disease Mother      Diabetes Mother     Thyroid disease Mother     COPD Mother     Arthritis Mother     Anxiety disorder Mother     Depression Father         Suicided in 1990    Anxiety disorder Father     Completed Suicide  Father     No Known Problems Maternal Grandmother     No Known Problems Maternal Grandfather     Arthritis Paternal Grandmother     No Known Problems Paternal Grandfather     No Known Problems Maternal Aunt     No Known Problems Maternal Aunt     No Known Problems Maternal Aunt     Arthritis Cousin       Current Medications:     Current Outpatient Medications   Medication Sig Dispense Refill    albuterol (Ventolin HFA) 90 mcg/act inhaler Inhale 2 puffs every 6 (six) hours as needed for wheezing 18 g 0    busPIRone (BUSPAR) 7.5 mg tablet TAKE ONE TABLET BY MOUTH TWICE DAILY 180 tablet 3    escitalopram (LEXAPRO) 20 mg tablet Take 1 tablet (20 mg total) by mouth daily 30 tablet 5    Finacea 15 % FOAM       levothyroxine 50 mcg tablet TAKE 1 TABLET BY MOUTH DAILY 30 tablet 5    LORazepam (ATIVAN) 1 mg tablet Take 1 tablet (1 mg total) by mouth 2 (two) times a day as needed for anxiety 60 tablet 3    olmesartan (BENICAR) 20 mg tablet TAKE 1 TABLET BY MOUTH DAILY 30 tablet 3    pantoprazole (PROTONIX) 40 mg tablet TAKE 1 TABLET BY MOUTH DAILY 30 tablet 0    phentermine (ADIPEX-P) 37.5 MG tablet TAKE 1 TABLET BY MOUTH DAILY 30 tablet 3    spironolactone (ALDACTONE) 50 mg tablet TAKE 1 TABLET BY MOUTH DAILY (Patient taking differently: 50 mg 2 (two) times a day) 30 tablet 5    temazepam (RESTORIL) 15 mg capsule TAKE ONE OR TWO CAPSULES BY MOUTH DAILY AT BEDTIME AS NEEDED FOR INSOMNIA 60 capsule 3    Diclofenac Sodium (VOLTAREN) 1 % Apply 2 g topically 4 (four) times a day (Patient not taking: Reported on 1/20/2023) 50 g 1     No current facility-administered medications for this visit.      Allergies:     Allergies   Allergen Reactions    Amoxicillin Confusion, Delirium, Dizziness, Hives, Itching, Lightheadedness, Other  (See Comments), Photosensitivity, Syncope and Visual Disturbance    Iodine - Food Allergy      IV dye  Uncontrolled sneezing      Medical Tape Hives     Steri strips     Meperidine     Sulfa Antibiotics Hives    Sulfate     Amlodipine Edema    Lisinopril Cough      Physical Exam:     /86 (BP Location: Right arm, Patient Position: Sitting, Cuff Size: Large)   Pulse 91   Temp 97.9 °F (36.6 °C) (Tympanic)   Resp 18   SpO2 98%     Physical Exam  Vitals and nursing note reviewed.   Constitutional:       Appearance: Normal appearance.   HENT:      Head: Normocephalic and atraumatic.      Right Ear: Tympanic membrane, ear canal and external ear normal.      Left Ear: Tympanic membrane, ear canal and external ear normal.      Nose: Nose normal.      Mouth/Throat:      Mouth: Mucous membranes are moist.      Pharynx: Oropharynx is clear.   Eyes:      Conjunctiva/sclera: Conjunctivae normal.   Cardiovascular:      Rate and Rhythm: Normal rate and regular rhythm.      Heart sounds: Normal heart sounds.   Pulmonary:      Effort: Pulmonary effort is normal.      Breath sounds: Normal breath sounds.   Abdominal:      General: Bowel sounds are normal.      Palpations: Abdomen is soft.   Musculoskeletal:         General: Normal range of motion.      Cervical back: Normal range of motion and neck supple.   Skin:     General: Skin is warm and dry.      Capillary Refill: Capillary refill takes less than 2 seconds.   Neurological:      General: No focal deficit present.      Mental Status: She is alert and oriented to person, place, and time.   Psychiatric:         Mood and Affect: Mood normal.         Behavior: Behavior normal.          FRANCISC OJ Chandler [facility]

## 2024-02-03 DIAGNOSIS — I10 ESSENTIAL HYPERTENSION: ICD-10-CM

## 2024-02-05 RX ORDER — OLMESARTAN MEDOXOMIL 20 MG/1
20 TABLET ORAL DAILY
Qty: 30 TABLET | Refills: 5 | Status: SHIPPED | OUTPATIENT
Start: 2024-02-05

## 2024-02-07 ENCOUNTER — APPOINTMENT (OUTPATIENT)
Dept: RADIOLOGY | Facility: MEDICAL CENTER | Age: 59
End: 2024-02-07
Payer: COMMERCIAL

## 2024-02-07 ENCOUNTER — OFFICE VISIT (OUTPATIENT)
Dept: OBGYN CLINIC | Facility: MEDICAL CENTER | Age: 59
End: 2024-02-07
Payer: COMMERCIAL

## 2024-02-07 VITALS
HEIGHT: 67 IN | HEART RATE: 83 BPM | SYSTOLIC BLOOD PRESSURE: 137 MMHG | DIASTOLIC BLOOD PRESSURE: 83 MMHG | BODY MASS INDEX: 38.19 KG/M2

## 2024-02-07 DIAGNOSIS — M25.562 CHRONIC PAIN OF LEFT KNEE: ICD-10-CM

## 2024-02-07 DIAGNOSIS — M17.12 PRIMARY OSTEOARTHRITIS OF LEFT KNEE: Primary | ICD-10-CM

## 2024-02-07 DIAGNOSIS — G89.29 CHRONIC PAIN OF LEFT KNEE: ICD-10-CM

## 2024-02-07 DIAGNOSIS — M25.562 LEFT KNEE PAIN, UNSPECIFIED CHRONICITY: ICD-10-CM

## 2024-02-07 DIAGNOSIS — Z01.89 ENCOUNTER FOR LOWER EXTREMITY COMPARISON IMAGING STUDY: ICD-10-CM

## 2024-02-07 PROCEDURE — 73562 X-RAY EXAM OF KNEE 3: CPT

## 2024-02-07 PROCEDURE — 73564 X-RAY EXAM KNEE 4 OR MORE: CPT

## 2024-02-07 PROCEDURE — 99213 OFFICE O/P EST LOW 20 MIN: CPT | Performed by: ORTHOPAEDIC SURGERY

## 2024-02-07 NOTE — PROGRESS NOTES
Ortho Sports Medicine Knee New Patient Visit     Assesment:   58 y.o. female left knee osteoarthritis     Plan:    X-rays were reviewed which demonstrate left knee osteoarthritis  Discussed with the patient that she may ultimately require knee replacement   Treatment options were discussed in the form of therapy and injections  The patient would like to hold off and discuss knee replacement  A referral was provided to one of my partners  to discuss knee replacement  She may follow up with me as needed    Conservative treatment:    Continue with activities as tolerated     Imaging:    All imaging from today was reviewed by myself and explained to the patient.       Injection:    No Injection planned at this time.      Surgery:     Referral provided to discuss knee replacement       Follow up:    Return if symptoms worsen or fail to improve, for ref to Armen or Liya.        Chief Complaint   Patient presents with    Left Knee - Pain       History of Present Illness:    The patient is a 58 y.o. 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      Pain is located deep.  The patient rates the pain as a 4/10.  The pain has been present for 4 months.      The patient denies injury or trauma. She denies much pain and states her main complaints are stiffness and popping to the medial aspect of her knee. The patient has a history of right knee replacement performed in [place].       The patient denies prior treatment.         Knee Surgical History:  Synovectomy and clean out     Past Medical, Social and Family History:  Past Medical History:   Diagnosis Date    Anemia     Anxiety     Arthritis     Asthma     Bariatric surgery status     Depression     Disease of thyroid gland     hypothyroid    Edema     GERD (gastroesophageal reflux disease)     History of anemia     History of edema     Hypertension      Insomnia     Morbid obesity (HCC)     Obesity 1968    Pneumonia Once in 2015    Postgastrectomy malabsorption     Spinal stenosis      Past Surgical History:   Procedure Laterality Date    ANKLE SURGERY Bilateral     tendon repair    CARPAL TUNNEL RELEASE Bilateral     CHOLECYSTECTOMY      COLONOSCOPY      GASTRIC BYPASS      GASTRIC BYPASS LAPAROSCOPIC N/A 11/20/2018    Procedure: LAPAROSCOPIC REVISION OF MARBELLA-EN-Y BYPASS W/ROBOTICS & INTRAOP EGD;  Surgeon: Jackson Villalba MD;  Location: [facility];  Service: Bariatrics    JOINT REPLACEMENT  2002, 2011    Left hip, right knee    NEUROMA EXCISION      single Dewitt's neuroma    NV EGD TRANSORAL BIOPSY SINGLE/MULTIPLE N/A 08/22/2018    Procedure: ESOPHAGOGASTRODUODENOSCOPY (EGD);  Surgeon: Jackson Villalba MD;  [facility]: [facility];  Service: Bariatrics    ROTATOR CUFF REPAIR      left    SHOULDER SURGERY      TONSILLECTOMY      TOTAL HIP ARTHROPLASTY Left     TOTAL KNEE ARTHROPLASTY Right      Allergies   Allergen Reactions    Amoxicillin Confusion, Delirium, Dizziness, Hives, Itching, Lightheadedness, Other (See Comments), Photosensitivity, Syncope and Visual Disturbance    Iodine - Food Allergy      IV dye  Uncontrolled sneezing      Medical Tape Hives     Steri strips     Meperidine     Sulfa Antibiotics Hives    Sulfate     Amlodipine Edema    Lisinopril Cough     Current Outpatient Medications on File Prior to Visit   Medication Sig Dispense Refill    albuterol (Ventolin HFA) 90 mcg/act inhaler Inhale 2 puffs every 6 (six) hours as needed for wheezing 18 g 0    busPIRone (BUSPAR) 7.5 mg tablet TAKE ONE TABLET BY MOUTH TWICE DAILY 180 tablet 3    escitalopram (LEXAPRO) 20 mg tablet Take 1 tablet (20 mg total) by mouth daily 30 tablet 5    Finacea 15 % FOAM       levothyroxine 50 mcg tablet TAKE 1 TABLET BY MOUTH DAILY 30 tablet 5    LORazepam (ATIVAN) 1 mg tablet Take 1 tablet (1 mg total) by mouth 2 (two) times a day as needed for anxiety 60 tablet 3     olmesartan (BENICAR) 20 mg tablet TAKE 1 TABLET BY MOUTH DAILY 30 tablet 5    pantoprazole (PROTONIX) 40 mg tablet TAKE 1 TABLET BY MOUTH DAILY 30 tablet 0    phentermine (ADIPEX-P) 37.5 MG tablet TAKE 1 TABLET BY MOUTH DAILY 30 tablet 3    spironolactone (ALDACTONE) 50 mg tablet TAKE 1 TABLET BY MOUTH DAILY (Patient taking differently: 50 mg 2 (two) times a day) 30 tablet 5    temazepam (RESTORIL) 15 mg capsule TAKE ONE OR TWO CAPSULES BY MOUTH DAILY AT BEDTIME AS NEEDED FOR INSOMNIA 60 capsule 3    Diclofenac Sodium (VOLTAREN) 1 % Apply 2 g topically 4 (four) times a day (Patient not taking: Reported on 1/20/2023) 50 g 1     No current facility-administered medications on file prior to visit.     Social History     Socioeconomic History    Marital status: /Civil Union     Spouse name: Not on file    Number of children: Not on file    Years of education: Not on file    Highest education level: Not on file   Occupational History    Not on file   Tobacco Use    Smoking status: Never    Smokeless tobacco: Never   Vaping Use    Vaping status: Never Used   Substance and Sexual Activity    Alcohol use: Yes     Alcohol/week: 10.0 standard drinks of alcohol     Types: 3 Glasses of wine, 7 Shots of liquor per week     Comment: social     Drug use: No    Sexual activity: Not Currently     Partners: Male     Birth control/protection: Post-menopausal   Other Topics Concern    Not on file   Social History Narrative    Not on file     Social Determinants of Health     Financial Resource Strain: Not on file   Food Insecurity: Not on file   Transportation Needs: Not on file   Physical Activity: Not on file   Stress: Not on file   Social Connections: Not on file   Intimate Partner Violence: Not on file   Housing Stability: Not on file         I have reviewed the past medical, surgical, social and family history, medications and allergies as documented in the EMR.    Review of systems: ROS is negative other than that noted in  "the HPI.  Constitutional: Negative for fatigue and fever.   HENT: Negative for sore throat.    Respiratory: Negative for shortness of breath.    Cardiovascular: Negative for chest pain.   Gastrointestinal: Negative for abdominal pain.   Endocrine: Negative for cold intolerance and heat intolerance.   Genitourinary: Negative for flank pain.   Musculoskeletal: Negative for back pain.   Skin: Negative for rash.   Allergic/Immunologic: Negative for immunocompromised state.   Neurological: Negative for dizziness.   Psychiatric/Behavioral: Negative for agitation.      Physical Exam:    Blood pressure 137/83, pulse 83, height 5' 7.32\" (1.71 m).    General/Constitutional: NAD, well developed, well nourished  HENT: Normocephalic, atraumatic  CV: Intact distal pulses, regular rate  Resp: No respiratory distress or labored breathing  GI: Soft and non-tender   Lymphatic: No lymphadenopathy palpated  Neuro: Alert and Oriented x 3, no focal deficits  Psych: Normal mood, normal affect, normal judgement, normal behavior  Skin: Warm, dry, no rashes, no erythema      Knee Exam (focused):                RIGHT LEFT   ROM:   0-130 0-130   Palpation: Effusion negative negative     MJL tenderness Negative Negative     LJL tenderness Negative Negative   Meniscus: Vicki Negative Negative    Apley's Compression Negative Negative   Instability: Varus stable stable     Valgus stable stable   Special Tests: Lachman Negative Negative     Posterior drawer Negative Negative     Anterior drawer Negative Negative     Pivot shift not tested not tested     Dial not tested not tested   Patella: Palpation no tenderness no tenderness     Mobility 1/4 1/4     Apprehension Negative Negative   Other: Single leg 1/4 squat not tested not tested      LE NV Exam: +2 DP/PT pulses bilaterally  Sensation intact to light touch L2-S1 bilaterally     Bilateral hip ROM demonstrates no pain actively or passively    No calf tenderness to palpation bilaterally    Knee " Imaging    X-rays of the left knee were reviewed, which demonstrate left knee osteoarthritis.  I have reviewed the radiology report and do not currently have a radiology reading from [facility], but will check the result once the reading is performed.          Scribe Attestation      I,:  Amarilys Raygoza MA am acting as a scribe while in the presence of the attending physician.:       I,:  Petey Laurent, DO personally performed the services described in this documentation    as scribed in my presence.:

## 2024-03-07 DIAGNOSIS — K21.9 GASTROESOPHAGEAL REFLUX DISEASE WITHOUT ESOPHAGITIS: ICD-10-CM

## 2024-03-08 RX ORDER — PANTOPRAZOLE SODIUM 40 MG/1
TABLET, DELAYED RELEASE ORAL
Qty: 30 TABLET | Refills: 0 | Status: SHIPPED | OUTPATIENT
Start: 2024-03-08 | End: 2024-03-17

## 2024-03-16 DIAGNOSIS — K21.9 GASTROESOPHAGEAL REFLUX DISEASE WITHOUT ESOPHAGITIS: ICD-10-CM

## 2024-03-17 RX ORDER — PANTOPRAZOLE SODIUM 40 MG/1
TABLET, DELAYED RELEASE ORAL
Qty: 30 TABLET | Refills: 0 | Status: SHIPPED | OUTPATIENT
Start: 2024-03-17

## 2024-03-24 DIAGNOSIS — F41.9 ANXIETY: ICD-10-CM

## 2024-03-25 RX ORDER — ESCITALOPRAM OXALATE 20 MG/1
20 TABLET ORAL DAILY
Qty: 30 TABLET | Refills: 5 | Status: SHIPPED | OUTPATIENT
Start: 2024-03-25

## 2024-04-08 ENCOUNTER — OFFICE VISIT (OUTPATIENT)
Dept: OBGYN CLINIC | Facility: MEDICAL CENTER | Age: 59
End: 2024-04-08
Payer: COMMERCIAL

## 2024-04-08 ENCOUNTER — OFFICE VISIT (OUTPATIENT)
Dept: OBGYN CLINIC | Facility: CLINIC | Age: 59
End: 2024-04-08
Payer: COMMERCIAL

## 2024-04-08 VITALS — HEIGHT: 67 IN | WEIGHT: 246 LBS | BODY MASS INDEX: 38.61 KG/M2

## 2024-04-08 VITALS
HEART RATE: 85 BPM | SYSTOLIC BLOOD PRESSURE: 165 MMHG | BODY MASS INDEX: 37.96 KG/M2 | HEIGHT: 68 IN | DIASTOLIC BLOOD PRESSURE: 98 MMHG

## 2024-04-08 DIAGNOSIS — M17.12 PRIMARY OSTEOARTHRITIS OF LEFT KNEE: Primary | ICD-10-CM

## 2024-04-08 DIAGNOSIS — M25.562 CHRONIC PAIN OF LEFT KNEE: ICD-10-CM

## 2024-04-08 DIAGNOSIS — M18.0 PRIMARY OSTEOARTHRITIS OF BOTH FIRST CARPOMETACARPAL JOINTS: Primary | ICD-10-CM

## 2024-04-08 DIAGNOSIS — Z96.651 HISTORY OF RIGHT KNEE JOINT REPLACEMENT: ICD-10-CM

## 2024-04-08 DIAGNOSIS — Z96.642 HISTORY OF LEFT HIP REPLACEMENT: ICD-10-CM

## 2024-04-08 DIAGNOSIS — G89.29 CHRONIC PAIN OF LEFT KNEE: ICD-10-CM

## 2024-04-08 PROCEDURE — 99214 OFFICE O/P EST MOD 30 MIN: CPT | Performed by: STUDENT IN AN ORGANIZED HEALTH CARE EDUCATION/TRAINING PROGRAM

## 2024-04-08 PROCEDURE — 20610 DRAIN/INJ JOINT/BURSA W/O US: CPT | Performed by: STUDENT IN AN ORGANIZED HEALTH CARE EDUCATION/TRAINING PROGRAM

## 2024-04-08 PROCEDURE — 99213 OFFICE O/P EST LOW 20 MIN: CPT | Performed by: SURGERY

## 2024-04-08 RX ADMIN — BUPIVACAINE HYDROCHLORIDE 4 ML: 2.5 INJECTION, SOLUTION INFILTRATION; PERINEURAL at 15:30

## 2024-04-08 RX ADMIN — TRIAMCINOLONE ACETONIDE 40 MG: 40 INJECTION, SUSPENSION INTRA-ARTICULAR; INTRAMUSCULAR at 15:30

## 2024-04-08 NOTE — PROGRESS NOTES
Knee New Office Note    Assessment:     1. Primary osteoarthritis of left knee    2. Chronic pain of left knee    3. History of right knee joint replacement    4. History of left hip replacement        Plan:     Problem List Items Addressed This Visit    None  Visit Diagnoses       Primary osteoarthritis of left knee    -  Primary    Chronic pain of left knee        History of right knee joint replacement        History of left hip replacement               58 y.o. with Left knee pain secondary to severe, bone on bone OA.  Xrays of the left knee reviewed today showing severe arthritic changes with decreased joint space, osteophyte formation, and valgus alignment.  On physical exam she has loss of full extension, but maintained good flexion with pain.  Discussed conservative treatment options to include cortisone injections, visco injections, oral NSAIDs, Tylenol, activity modifications, staying active with low impact exercises, and weight loss.  She has not had any treatment on her left knee in the past, so we will start with cortisone injection into the left knee.    Her right knee is doing well and she has no pain with good ROM and stability.   In regards to her left hip, she has a history of a Left DAVID in 2001 and has not been followed recently.  We will see her back in a few weeks with xrays of the left hip and to see how she did with the cortisone injection.     Subjective:     Patient ID: Gris Salmeron is a 58 y.o. female.  Chief Complaint:  HPI:  58 y.o. female who presents today for an evaluation of her LEFT knee.  She states that she has been having pain in the left knee that has been progressively worsening.  She has had no recent treatment on her left knee.  Pain is localized to the lateral aspect of the left knee.  She states that when she was 5 years old she had a synovectomy of her left knee as they were concerned that she had oncologic process which was then negative. She also has a history of a Right  TKA.  Prior to her right TKA she had several surgeries and then tried both cortisone and visco, which she did not feel helped her much.  She also has a history of a Left DAVID.  She is here today for further treatment of her left knee as the pain is affecting her ADLs and activities she enjoys.      Allergy:  Allergies   Allergen Reactions    Amoxicillin Confusion, Delirium, Dizziness, Hives, Itching, Lightheadedness, Other (See Comments), Photosensitivity, Syncope and Visual Disturbance    Iodine - Food Allergy      IV dye  Uncontrolled sneezing      Medical Tape Hives     Steri strips     Meperidine     Sulfa Antibiotics Hives    Sulfate     Amlodipine Edema    Lisinopril Cough     Medications:  all current active meds have been reviewed  Past Medical History:  Past Medical History:   Diagnosis Date    Anemia     Anxiety     Arthritis     Asthma     Bariatric surgery status     Depression     Disease of thyroid gland     hypothyroid    Edema     GERD (gastroesophageal reflux disease)     History of anemia     History of edema     Hypertension     Insomnia     Morbid obesity (HCC)     Obesity 1968    Pneumonia Once in 2015    Postgastrectomy malabsorption     Spinal stenosis      Past Surgical History:  Past Surgical History:   Procedure Laterality Date    ANKLE SURGERY Bilateral     tendon repair    CARPAL TUNNEL RELEASE Bilateral     CHOLECYSTECTOMY      COLONOSCOPY      GASTRIC BYPASS      GASTRIC BYPASS LAPAROSCOPIC N/A 11/20/2018    Procedure: LAPAROSCOPIC REVISION OF MARBELLA-EN-Y BYPASS W/ROBOTICS & INTRAOP EGD;  Surgeon: Jackson Villalba MD;  Location: [facility];  Service: Bariatrics    JOINT REPLACEMENT  2002, 2011    Left hip, right knee    NEUROMA EXCISION      single Dewitt's neuroma    PA EGD TRANSORAL BIOPSY SINGLE/MULTIPLE N/A 08/22/2018    Procedure: ESOPHAGOGASTRODUODENOSCOPY (EGD);  Surgeon: Jackson Villalba MD;  Location: [facility];  Service: Bariatrics    ROTATOR CUFF REPAIR      left    SHOULDER  SURGERY      TONSILLECTOMY      TOTAL HIP ARTHROPLASTY Left     TOTAL KNEE ARTHROPLASTY Right      Family History:  Family History   Problem Relation Age of Onset    Hypertension Mother     Other Mother         cardiac disorder    Diabetes type II Mother         without complication, with long term use of insulin    Depression Mother         Treated with Zoloft    Heart disease Mother     Diabetes Mother     Thyroid disease Mother     COPD Mother     Arthritis Mother     Anxiety disorder Mother     Depression Father         Suicided in 1990    Anxiety disorder Father     Completed Suicide  Father     No Known Problems Maternal Grandmother     No Known Problems Maternal Grandfather     Arthritis Paternal Grandmother     No Known Problems Paternal Grandfather     No Known Problems Maternal Aunt     No Known Problems Maternal Aunt     No Known Problems Maternal Aunt     Arthritis Cousin      Social History:  Social History     Substance and Sexual Activity   Alcohol Use Yes    Alcohol/week: 10.0 standard drinks of alcohol    Types: 3 Glasses of wine, 7 Shots of liquor per week    Comment: social      Social History     Substance and Sexual Activity   Drug Use No     Social History     Tobacco Use   Smoking Status Never   Smokeless Tobacco Never           ROS:  General: Per HPI  Skin: Negative, except if noted below  HEENT: Negative  Respiratory: Negative  Cardiovascular: Negative  Gastrointestinal: Negative  Urinary: Negative  Vascular: Negative  Musculoskeletal: Positive per HPI   Neurologic: Positive per HPI  Endocrine: Negative    Objective:  BP Readings from Last 1 Encounters:   04/08/24 165/98      Wt Readings from Last 1 Encounters:   04/08/24 112 kg (246 lb)        Respiratory:   non-labored respirations    Lymphatics:  no palpable lymph nodes    Gait:   antalgic    Neurologic:   Alert and oriented times 3  Patient with normal sensation except as noted below  Deep tendon reflexes 2+ except as noted in MSK  "exam    Bilateral Lower Extremity:  Left Knee:      Inspection:  skin intact, well healed surgical incision    Overall limb alignment valgus    Effusion: trace    ROM 5-120 with pain    Extensor Lag: none    Palpation: medial and lateral Joint line tenderness to palpation    AP Stability at 90 deg stable    M/L stability in full extension stable    M/L stability in midflexion stable    Motor: 5/5 Q/HS/TA/GS/P    Pulses: 2+ DP / 2+ PT    SILT DP/SP/S/S/TN    Imaging:  My interpretation AP bilateral knee/lateral/apodaca/sunrise left knee: severe joint space narrowing, subchondral sclerosis, subchondral cysts, osteophyte formation. Bone on bone changes. No fracture or dislocation.     Large joint arthrocentesis: L knee  Universal Protocol:  Consent: Verbal consent obtained.  Risks and benefits: risks, benefits and alternatives were discussed  Consent given by: patient  Patient understanding: patient states understanding of the procedure being performed  Supporting Documentation  Indications: pain   Procedure Details  Location: knee - L knee  Preparation: Patient was prepped and draped in the usual sterile fashion  Needle size: 22 G  Approach: anterolateral  Medications administered: 4 mL bupivacaine 0.25 %; 40 mg triamcinolone acetonide 40 mg/mL    Patient tolerance: patient tolerated the procedure well with no immediate complications  Dressing:  Sterile dressing applied          BMI:   Estimated body mass index is 37.96 kg/m² as calculated from the following:    Height as of this encounter: 5' 7.5\" (1.715 m).    Weight as of an earlier encounter on 4/8/24: 112 kg (246 lb).  BSA:   Estimated body surface area is 2.22 meters squared as calculated from the following:    Height as of this encounter: 5' 7.5\" (1.715 m).    Weight as of an earlier encounter on 4/8/24: 112 kg (246 lb).           Scribe Attestation      I,:  Mey Moffett PA-C am acting as a scribe while in the presence of the attending physician.:     "   I,:  Niko Ayers, DO personally performed the services described in this documentation    as scribed in my presence.:

## 2024-04-08 NOTE — PROGRESS NOTES
ORTHOPAEDIC HAND, WRIST, AND ELBOW OFFICE  VISIT       ASSESSMENT/PLAN:      58 y.o. year old female who presents with Bilateral Thumb pain from mechanical fall with underlying bilateral thumb basal joint arthritis    Physical exam was performed and we discussed the findings  We discussed treatment options of bracing at night and injections  We will dispense bilateral thumb spicas for night time use  NSAID's as needed for pain.  Can try around the clock for 3-5 days with meals then PRN    The patient verbalized understanding of exam findings and treatment plan. We engaged in the shared decision-making process and treatment options were discussed at length with the patient. Surgical and conservative management discussed today along with risks and benefits.    Diagnoses and all orders for this visit:    Primary osteoarthritis of both first carpometacarpal joints  -     Durable Medical Equipment        Follow Up:  Return in about 6 weeks (around 5/20/2024) for Recheck.    To Do Next Visit:  Re-evaluation of current issue          ____________________________________________________________________________________________________________________________________________      CHIEF COMPLAINT:  Chief Complaint   Patient presents with    Left Wrist - Pain, Numbness, Swelling     Recent re-injury 2 weeks ago     Right Wrist - Pain, Numbness, Swelling       SUBJECTIVE:  Gris Salmeron is a 58 y.o. year old  female who presents with bilateral thumb pain after mechanical fall      Patient states she has a had a fall on March 12th and landed on both open hands, Left first then Right. She states she has had pain in the base of her thumbs since that has not improved. She states she has been using Voltaren gel, ice and a brace that is meant for her Right thumb  She reports pain with activities as well as occasional numbness  She cannot fully extend her thumbs    Past Bilateral CTR's    I have personally reviewed all the relevant PMH,  PSH, SH, FH, Medications and allergies      PAST MEDICAL HISTORY:  Past Medical History:   Diagnosis Date    Anemia     Anxiety     Arthritis     Asthma     Bariatric surgery status     Depression     Disease of thyroid gland     hypothyroid    Edema     GERD (gastroesophageal reflux disease)     History of anemia     History of edema     Hypertension     Insomnia     Morbid obesity (HCC)     Obesity 1968    Pneumonia Once in 2015    Postgastrectomy malabsorption     Spinal stenosis        PAST SURGICAL HISTORY:  Past Surgical History:   Procedure Laterality Date    ANKLE SURGERY Bilateral     tendon repair    CARPAL TUNNEL RELEASE Bilateral     CHOLECYSTECTOMY      COLONOSCOPY      GASTRIC BYPASS      GASTRIC BYPASS LAPAROSCOPIC N/A 11/20/2018    Procedure: LAPAROSCOPIC REVISION OF MARBELLA-EN-Y BYPASS W/ROBOTICS & INTRAOP EGD;  Surgeon: Jackson Villalba MD;  Location: [facility];  Service: Bariatrics    JOINT REPLACEMENT  2002, 2011    Left hip, right knee    NEUROMA EXCISION      single Dewitt's neuroma    ME EGD TRANSORAL BIOPSY SINGLE/MULTIPLE N/A 08/22/2018    Procedure: ESOPHAGOGASTRODUODENOSCOPY (EGD);  Surgeon: Jackson Villalba MD;  Location: [facility];  Service: Bariatrics    ROTATOR CUFF REPAIR      left    SHOULDER SURGERY      TONSILLECTOMY      TOTAL HIP ARTHROPLASTY Left     TOTAL KNEE ARTHROPLASTY Right        FAMILY HISTORY:  Family History   Problem Relation Age of Onset    Hypertension Mother     Other Mother         cardiac disorder    Diabetes type II Mother         without complication, with long term use of insulin    Depression Mother         Treated with Zoloft    Heart disease Mother     Diabetes Mother     Thyroid disease Mother     COPD Mother     Arthritis Mother     Anxiety disorder Mother     Depression Father         Suicided in 1990    Anxiety disorder Father     Completed Suicide  Father     No Known Problems Maternal Grandmother     No Known Problems Maternal Grandfather     Arthritis  Paternal Grandmother     No Known Problems Paternal Grandfather     No Known Problems Maternal Aunt     No Known Problems Maternal Aunt     No Known Problems Maternal Aunt     Arthritis Cousin        SOCIAL HISTORY:  Social History     Tobacco Use    Smoking status: Never    Smokeless tobacco: Never   Vaping Use    Vaping status: Never Used   Substance Use Topics    Alcohol use: Yes     Alcohol/week: 10.0 standard drinks of alcohol     Types: 3 Glasses of wine, 7 Shots of liquor per week     Comment: social     Drug use: No       MEDICATIONS:    Current Outpatient Medications:     albuterol (Ventolin HFA) 90 mcg/act inhaler, Inhale 2 puffs every 6 (six) hours as needed for wheezing, Disp: 18 g, Rfl: 0    busPIRone (BUSPAR) 7.5 mg tablet, TAKE ONE TABLET BY MOUTH TWICE DAILY, Disp: 180 tablet, Rfl: 3    escitalopram (LEXAPRO) 20 mg tablet, TAKE 1 TABLET BY MOUTH DAILY, Disp: 30 tablet, Rfl: 5    levothyroxine 50 mcg tablet, TAKE 1 TABLET BY MOUTH DAILY, Disp: 30 tablet, Rfl: 5    LORazepam (ATIVAN) 1 mg tablet, Take 1 tablet (1 mg total) by mouth 2 (two) times a day as needed for anxiety, Disp: 60 tablet, Rfl: 3    olmesartan (BENICAR) 20 mg tablet, TAKE 1 TABLET BY MOUTH DAILY, Disp: 30 tablet, Rfl: 5    pantoprazole (PROTONIX) 40 mg tablet, TAKE 1 TABLET BY MOUTH DAILY, Disp: 30 tablet, Rfl: 0    phentermine (ADIPEX-P) 37.5 MG tablet, TAKE 1 TABLET BY MOUTH DAILY, Disp: 30 tablet, Rfl: 3    temazepam (RESTORIL) 15 mg capsule, TAKE ONE OR TWO CAPSULES BY MOUTH DAILY AT BEDTIME AS NEEDED FOR INSOMNIA, Disp: 60 capsule, Rfl: 3    Diclofenac Sodium (VOLTAREN) 1 %, Apply 2 g topically 4 (four) times a day (Patient not taking: Reported on 4/8/2024), Disp: 50 g, Rfl: 1    Finacea 15 % FOAM, , Disp: , Rfl:     spironolactone (ALDACTONE) 50 mg tablet, TAKE 1 TABLET BY MOUTH DAILY (Patient not taking: Reported on 4/8/2024), Disp: 30 tablet, Rfl: 5    ALLERGIES:  Allergies   Allergen Reactions    Amoxicillin Confusion,  "Delirium, Dizziness, Hives, Itching, Lightheadedness, Other (See Comments), Photosensitivity, Syncope and Visual Disturbance    Iodine - Food Allergy      IV dye  Uncontrolled sneezing      Medical Tape Hives     Steri strips     Meperidine     Sulfa Antibiotics Hives    Sulfate     Amlodipine Edema    Lisinopril Cough           REVIEW OF SYSTEMS:  Review of Systems   Constitutional:  Negative for chills and fever.   HENT:  Negative for ear pain and sore throat.    Eyes:  Negative for pain and visual disturbance.   Respiratory:  Negative for cough and shortness of breath.    Cardiovascular:  Negative for chest pain and palpitations.   Gastrointestinal:  Negative for abdominal pain and vomiting.   Genitourinary:  Negative for dysuria and hematuria.   Musculoskeletal:  Positive for arthralgias. Negative for back pain.   Skin:  Negative for color change and rash.   Neurological:  Negative for seizures and syncope.   All other systems reviewed and are negative.      VITALS:  There were no vitals filed for this visit.    LABS:  HgA1c: No results found for: \"HGBA1C\"  BMP:   Lab Results   Component Value Date    CALCIUM 9.1 04/19/2021    CALCIUM 9.1 04/19/2021    K 4.6 04/19/2021    CO2 26 04/19/2021     04/19/2021    BUN 15 04/19/2021    CREATININE 0.79 04/19/2021       _____________________________________________________  PHYSICAL EXAMINATION:  General: well developed and well nourished, alert, oriented times 3, and appears comfortable  Psychiatric: Normal  HEENT: Normocephalic, Atraumatic Trachea Midline, No torticollis  Pulmonary: No audible wheezing or respiratory distress   Abdomen/GI: Non tender, non distended   Cardiovascular: No pitting edema, 2+ radial pulse   Skin: No masses, erythema, lacerations, fluctation, ulcerations  Neurovascular: Sensation Intact to the Median, Ulnar, Radial Nerve, Motor Intact to the Median, Ulnar, Radial Nerve, and Pulses Intact  Musculoskeletal: Normal, except as noted in " detailed exam and in HPI.      MUSCULOSKELETAL EXAMINATION:  Right hand:  SILT  Composite fist    Tender at [facility], FCR    Left hand:  SILT  Composite fist    Tender at [facility]. FCR  ___________________________________________________  STUDIES REVIEWED:  I have personally reviewed AP lateral and oblique radiographs of Right hand   which demonstrate FINDINGS:     There is no acute fracture or dislocation.     Severe first carpometacarpal osteoarthritis. Mild STT osteoarthritis.     No lytic or blastic osseous lesion.     Soft tissues are unremarkable.     IMPRESSION:     Severe first carpometacarpal osteoarthritis. Mild STT osteoarthritis.         PROCEDURES PERFORMED:  Procedures  No Procedures performed today    _____________________________________________________      Scribe Attestation      I,:  Kaleb Velasco am acting as a scribe while in the presence of the attending physician.:       I,:  Juice Limon MD personally performed the services described in this documentation    as scribed in my presence.:

## 2024-04-09 RX ORDER — TRIAMCINOLONE ACETONIDE 40 MG/ML
40 INJECTION, SUSPENSION INTRA-ARTICULAR; INTRAMUSCULAR
Status: COMPLETED | OUTPATIENT
Start: 2024-04-08 | End: 2024-04-08

## 2024-04-09 RX ORDER — BUPIVACAINE HYDROCHLORIDE 2.5 MG/ML
4 INJECTION, SOLUTION INFILTRATION; PERINEURAL
Status: COMPLETED | OUTPATIENT
Start: 2024-04-08 | End: 2024-04-08

## 2024-04-19 ENCOUNTER — APPOINTMENT (OUTPATIENT)
Dept: RADIOLOGY | Facility: MEDICAL CENTER | Age: 59
End: 2024-04-19
Payer: COMMERCIAL

## 2024-04-19 ENCOUNTER — OFFICE VISIT (OUTPATIENT)
Dept: OBGYN CLINIC | Facility: MEDICAL CENTER | Age: 59
End: 2024-04-19
Payer: COMMERCIAL

## 2024-04-19 VITALS
BODY MASS INDEX: 38.16 KG/M2 | SYSTOLIC BLOOD PRESSURE: 165 MMHG | HEIGHT: 67 IN | DIASTOLIC BLOOD PRESSURE: 90 MMHG | HEART RATE: 89 BPM

## 2024-04-19 DIAGNOSIS — M19.012 ARTHRITIS OF LEFT ACROMIOCLAVICULAR JOINT: Primary | ICD-10-CM

## 2024-04-19 DIAGNOSIS — M25.512 LEFT SHOULDER PAIN, UNSPECIFIED CHRONICITY: ICD-10-CM

## 2024-04-19 DIAGNOSIS — M75.102 TEAR OF LEFT ROTATOR CUFF, UNSPECIFIED TEAR EXTENT, UNSPECIFIED WHETHER TRAUMATIC: ICD-10-CM

## 2024-04-19 PROCEDURE — 99213 OFFICE O/P EST LOW 20 MIN: CPT | Performed by: ORTHOPAEDIC SURGERY

## 2024-04-19 PROCEDURE — 73030 X-RAY EXAM OF SHOULDER: CPT

## 2024-04-19 NOTE — PROGRESS NOTES
Ortho Sports Medicine Shoulder Visit     Assesment:   left shoulder rotator cuff tear and AC joint arthritis    Plan:    Gris presents today with clinical exam, subjective history, and radiographs most consistent with AC joint osteoarthritis, glenohumeral arthritis, and possible rotator cuff tear. She does have the surgical history of distal clavicle excision in 2016, however on x-ray today it appears her clavicle was possibly not resected fully, which could be the cause of her persistent mechanical symptoms. There is evidence of osteophyte formation in the AC joint as well as glenohumeral joints suggestive of osteoarthritis. We will obtain an MRI of the left shoulder to further evaluate the AC joint as well as the rotator cuff due to mild weakness present on exam today. We will plan to see her back after her MRI to further delineate appropriate treatment, based on results.     Conservative treatment:    Ice to shoulder 1-2 times daily, for 20 minutes at a time.  Home exercise program for shoulder, including ROM and strenthening.  Instructions given to patient of what exercises to perform.      Imaging:    All imaging from today was reviewed by myself and explained to the patient.   We will obtain an MRI of the shoulder to evaluate the AC joint and rotator cuff.      Injection:    No Injection planned at this time.  May consider future corticosteroid injection depending on clinical exam/imaging.      Surgery:     No surgery is recommended at this point, continue with conservative treatment plan as noted.        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      The patient denies a history of diabetes.  The patient has a history of thyroid disorder (Hashimoto's thyroiditis).     Pain began 4-5 months ago with no  obvious mechanism or inciting event. She notes a significant amount of mechanical symptoms that are also painful. She feels like she is losing range of motion and has begun compensating for certain motions she has lost. She has as history of left shoulder distal clavicle excision, anterior acromioplasty and debridement of the subacromial bursa in 2016 with good outcome. She does note a fall onto her outstretched arms 4 weeks ago that resulted in increased cruchiness in her shoulder.     Pain is located posterior, deep, superior.  The patient rates the pain as a 2/10 on average and 8/10 at its worse.  Pain is aggravated with increased activity. Pain is minimally improved with Ibuprofen, Voltaren gel. She has not initiated physical therapy or home exercises at this time.     The patient has weakness.  The patient denies numbness and tingling.      Shoulder Surgical History:  Left shoulder distal clavicle excision, anterior acromioplasty and debridement of the subacromial bursa    Past Medical, Social and Family History:  Past Medical History:   Diagnosis Date    Anemia     Anxiety     Arthritis     Asthma     Bariatric surgery status     Depression     Disease of thyroid gland     hypothyroid    Edema     GERD (gastroesophageal reflux disease)     History of anemia     History of edema     Hypertension     Insomnia     Morbid obesity (HCC)     Obesity 1968    Pneumonia Once in 2015    Postgastrectomy malabsorption     Spinal stenosis      Past Surgical History:   Procedure Laterality Date    ANKLE SURGERY Bilateral     tendon repair    CARPAL TUNNEL RELEASE Bilateral     CHOLECYSTECTOMY      COLONOSCOPY      GASTRIC BYPASS      GASTRIC BYPASS LAPAROSCOPIC N/A 11/20/2018    Procedure: LAPAROSCOPIC REVISION OF MARBELLA-EN-Y BYPASS W/ROBOTICS & INTRAOP EGD;  Surgeon: Jackson Villalba MD;  Location: [facility];  Service: Bariatrics    JOINT REPLACEMENT  2002, 2011    Left hip, right knee    NEUROMA EXCISION      single  Dewitt's neuroma    [place] EGD TRANSORAL BIOPSY SINGLE/MULTIPLE N/A 08/22/2018    Procedure: ESOPHAGOGASTRODUODENOSCOPY (EGD);  Surgeon: Jackson Villalba MD;  Location: [facility];  Service: Bariatrics    ROTATOR CUFF REPAIR      left    SHOULDER SURGERY      TONSILLECTOMY      TOTAL HIP ARTHROPLASTY Left     TOTAL KNEE ARTHROPLASTY Right      Allergies   Allergen Reactions    Amoxicillin Confusion, Delirium, Dizziness, Hives, Itching, Lightheadedness, Other (See Comments), Photosensitivity, Syncope and Visual Disturbance    Iodine - Food Allergy      IV dye  Uncontrolled sneezing      Medical Tape Hives     Steri strips     Meperidine     Sulfa Antibiotics Hives    Sulfate     Amlodipine Edema    Lisinopril Cough     Current Outpatient Medications on File Prior to Visit   Medication Sig Dispense Refill    albuterol (Ventolin HFA) 90 mcg/act inhaler Inhale 2 puffs every 6 (six) hours as needed for wheezing 18 g 0    busPIRone (BUSPAR) 7.5 mg tablet TAKE ONE TABLET BY MOUTH TWICE DAILY 180 tablet 3    escitalopram (LEXAPRO) 20 mg tablet TAKE 1 TABLET BY MOUTH DAILY 30 tablet 5    levothyroxine 50 mcg tablet TAKE 1 TABLET BY MOUTH DAILY 30 tablet 5    LORazepam (ATIVAN) 1 mg tablet Take 1 tablet (1 mg total) by mouth 2 (two) times a day as needed for anxiety 60 tablet 3    olmesartan (BENICAR) 20 mg tablet TAKE 1 TABLET BY MOUTH DAILY 30 tablet 5    pantoprazole (PROTONIX) 40 mg tablet TAKE 1 TABLET BY MOUTH DAILY 30 tablet 0    phentermine (ADIPEX-P) 37.5 MG tablet TAKE 1 TABLET BY MOUTH DAILY 30 tablet 3    temazepam (RESTORIL) 15 mg capsule TAKE ONE OR TWO CAPSULES BY MOUTH DAILY AT BEDTIME AS NEEDED FOR INSOMNIA 60 capsule 3    Diclofenac Sodium (VOLTAREN) 1 % Apply 2 g topically 4 (four) times a day (Patient not taking: Reported on 4/8/2024) 50 g 1    Finacea 15 % FOAM       spironolactone (ALDACTONE) 50 mg tablet TAKE 1 TABLET BY MOUTH DAILY (Patient not taking: Reported on 4/8/2024) 30 tablet 5     No current  "facility-administered medications on file prior to visit.     Social History     Socioeconomic History    Marital status: /Civil Union     Spouse name: Not on file    Number of children: Not on file    Years of education: Not on file    Highest education level: Not on file   Occupational History    Not on file   Tobacco Use    Smoking status: Never    Smokeless tobacco: Never   Vaping Use    Vaping status: Never Used   Substance and Sexual Activity    Alcohol use: Yes     Alcohol/week: 10.0 standard drinks of alcohol     Types: 3 Glasses of wine, 7 Shots of liquor per week     Comment: social     Drug use: No    Sexual activity: Not Currently     Partners: Male     Birth control/protection: Post-menopausal   Other Topics Concern    Not on file   Social History Narrative    Not on file     Social Determinants of Health     Financial Resource Strain: Not on file   Food Insecurity: Not on file   Transportation Needs: Not on file   Physical Activity: Not on file   Stress: Not on file   Social Connections: Not on file   Intimate Partner Violence: Not on file   Housing Stability: Not on file         I have reviewed the past medical, surgical, social and family history, medications and allergies as documented in the EMR.    Review of systems: ROS is negative other than that noted in the HPI.  Constitutional: Negative for fatigue and fever.   HENT: Negative for sore throat.    Respiratory: Negative for shortness of breath.    Cardiovascular: Negative for chest pain.   Gastrointestinal: Negative for abdominal pain.   Endocrine: Negative for cold intolerance and heat intolerance.   Genitourinary: Negative for flank pain.   Musculoskeletal: Negative for back pain.   Skin: Negative for rash.   Allergic/Immunologic: Negative for immunocompromised state.   Neurological: Negative for dizziness.   Psychiatric/Behavioral: Negative for agitation.      Physical Exam:    Blood pressure 165/90, pulse 89, height 5' 7.32\" (1.71 " m).    General/Constitutional: NAD, well developed, well nourished  HENT: Normocephalic, atraumatic  CV: Intact distal pulses, regular rate  Resp: No respiratory distress or labored breathing  Lymphatic: No lymphadenopathy palpated  Neuro: Alert and Oriented x 3, no focal deficits  Psych: Normal mood, normal affect, normal judgement, normal behavior  Skin: Warm, dry, no rashes, no erythema     Shoulder Exam (focused):    Shoulder focused exam:       RIGHT LEFT    Scapula Atrophy Negative Negative     Winging Negative Negative     Protraction Negative Negative    Rotator cuff SS 5/5 4+/5     IS 5/5 5/5     SubS 5/5 5/5    ROM  170     ER0 60 60     ER90 90 90     IR90 40 40     IRb T6 T6    TTP: AC Negative Negative     Biceps Negative Negative     Coracoid Negative Negative    Special Tests: O'Briens Negative Negative     Teixeira-shear Negative Negative     Cross body Adduction Negative Negative     Speeds  Negative Negative     Geovani's Negative Negative     Whipple Negative Negative       Neer Negative Negative     Ulloa Negative Negative    Instability: Apprehension & relocation not tested not tested     Load & shift not tested not tested    Other: Crank Negative Negative               UE NV Exam: +2 Radial pulses bilaterally  Sensation intact to light touch C5-T1 bilaterally, Radial/median/ulnar nerve motor intact      Bilateral elbow, wrist, and and forearm ROM full, painless with passive ROM, no ttp or crepitance throughout extremities below shoulder joint    Cervical ROM is full without pain, numbness or tingling      Shoulder Imaging    X-rays of the left shoulder were reviewed, which demonstrate history of distal clavicle excision, mild AC joint osteoarthritis, mild glenohumeral joint osteoarthritis with osteophyte formation.  I have reviewed the radiology report and do not currently have a radiology reading from [facility], but will check the result once the reading is performed.        Scribe  Attestation      I,:  Loni Jenkins am acting as a scribe while in the presence of the attending physician.:       I,:  Petey Laurent, DO personally performed the services described in this documentation    as scribed in my presence.:

## 2024-04-20 LAB
ALBUMIN SERPL-MCNC: 4.3 G/DL (ref 3.5–5.7)
ALP SERPL-CCNC: 91 U/L (ref 35–120)
ALT SERPL-CCNC: 40 U/L
ANION GAP SERPL CALCULATED.3IONS-SCNC: 8 MMOL/L (ref 3–11)
AST SERPL-CCNC: 29 U/L
BASOPHILS # BLD AUTO: 0 THOU/CMM (ref 0–0.1)
BASOPHILS NFR BLD AUTO: 1 %
BILIRUB SERPL-MCNC: 0.6 MG/DL (ref 0.2–1)
BUN SERPL-MCNC: 22 MG/DL (ref 7–25)
CALCIUM SERPL-MCNC: 9.2 MG/DL (ref 8.5–10.1)
CHLORIDE SERPL-SCNC: 103 MMOL/L (ref 100–109)
CHOLEST SERPL-MCNC: 173 MG/DL
CHOLEST/HDLC SERPL: 1.9 {RATIO}
CO2 SERPL-SCNC: 29 MMOL/L (ref 21–31)
CREAT SERPL-MCNC: 1.06 MG/DL (ref 0.4–1.1)
CYTOLOGY CMNT CVX/VAG CYTO-IMP: ABNORMAL
DIFFERENTIAL METHOD BLD: NORMAL
EOSINOPHIL # BLD AUTO: 0.1 THOU/CMM (ref 0–0.5)
EOSINOPHIL NFR BLD AUTO: 2 %
ERYTHROCYTE [DISTWIDTH] IN BLOOD BY AUTOMATED COUNT: 13 % (ref 12–16)
GFR/BSA.PRED SERPLBLD CYS-BASED-ARV: 61 ML/MIN/{1.73_M2}
GLUCOSE SERPL-MCNC: 102 MG/DL (ref 65–99)
HCT VFR BLD AUTO: 38.6 % (ref 35–43)
HDLC SERPL-MCNC: 89 MG/DL (ref 23–92)
HGB BLD-MCNC: 12.9 G/DL (ref 11.5–14.5)
LDLC SERPL CALC-MCNC: 62 MG/DL
LYMPHOCYTES # BLD AUTO: 1.7 THOU/CMM (ref 1–3)
LYMPHOCYTES NFR BLD AUTO: 28 %
MCH RBC QN AUTO: 33.4 PG (ref 26–34)
MCHC RBC AUTO-ENTMCNC: 33.5 G/DL (ref 32–37)
MCV RBC AUTO: 100 FL (ref 80–100)
MONOCYTES # BLD AUTO: 0.4 THOU/CMM (ref 0.3–1)
MONOCYTES NFR BLD AUTO: 7 %
NEUTROPHILS # BLD AUTO: 3.8 THOU/CMM (ref 1.8–7.8)
NEUTROPHILS NFR BLD AUTO: 62 %
NONHDLC SERPL-MCNC: 84 MG/DL
PLATELET # BLD AUTO: 258 THOU/CMM (ref 140–350)
PMV BLD REES-ECKER: 7.5 FL (ref 7.5–11.3)
POTASSIUM SERPL-SCNC: 5 MMOL/L (ref 3.5–5.2)
PROT SERPL-MCNC: 7 G/DL (ref 6.3–8.3)
RBC # BLD AUTO: 3.87 MILL/CMM (ref 3.7–4.7)
SODIUM SERPL-SCNC: 140 MMOL/L (ref 135–145)
T4 FREE SERPL-MCNC: 0.83 NG/DL (ref 0.61–1.12)
TRIGL SERPL-MCNC: 108 MG/DL
TSH SERPL-ACNC: 5.77 UIU/ML (ref 0.45–5.33)
WBC # BLD AUTO: 6.2 THOU/CMM (ref 4–10)

## 2024-04-22 DIAGNOSIS — E03.8 HYPOTHYROIDISM DUE TO HASHIMOTO'S THYROIDITIS: Primary | ICD-10-CM

## 2024-04-22 DIAGNOSIS — E06.3 HYPOTHYROIDISM DUE TO HASHIMOTO'S THYROIDITIS: Primary | ICD-10-CM

## 2024-04-22 RX ORDER — LEVOTHYROXINE SODIUM 0.1 MG/1
100 TABLET ORAL DAILY
Qty: 90 TABLET | Refills: 1 | Status: SHIPPED | OUTPATIENT
Start: 2024-04-22

## 2024-04-28 ENCOUNTER — HOSPITAL ENCOUNTER (OUTPATIENT)
Dept: MRI IMAGING | Facility: HOSPITAL | Age: 59
Discharge: HOME | End: 2024-04-28
Attending: ORTHOPAEDIC SURGERY
Payer: COMMERCIAL

## 2024-04-28 DIAGNOSIS — M75.102 TEAR OF LEFT ROTATOR CUFF, UNSPECIFIED TEAR EXTENT, UNSPECIFIED WHETHER TRAUMATIC: ICD-10-CM

## 2024-04-28 DIAGNOSIS — M25.512 LEFT SHOULDER PAIN, UNSPECIFIED CHRONICITY: ICD-10-CM

## 2024-04-28 DIAGNOSIS — M19.012 ARTHRITIS OF LEFT ACROMIOCLAVICULAR JOINT: ICD-10-CM

## 2024-04-28 PROCEDURE — 73221 MRI JOINT UPR EXTREM W/O DYE: CPT

## 2024-05-03 ENCOUNTER — OFFICE VISIT (OUTPATIENT)
Dept: OBGYN CLINIC | Facility: MEDICAL CENTER | Age: 59
End: 2024-05-03
Payer: COMMERCIAL

## 2024-05-03 VITALS
HEIGHT: 68 IN | SYSTOLIC BLOOD PRESSURE: 182 MMHG | BODY MASS INDEX: 37.96 KG/M2 | HEART RATE: 72 BPM | DIASTOLIC BLOOD PRESSURE: 101 MMHG

## 2024-05-03 DIAGNOSIS — M19.012 OSTEOARTHRITIS OF LEFT ACROMIOCLAVICULAR JOINT: Primary | ICD-10-CM

## 2024-05-03 PROCEDURE — 99214 OFFICE O/P EST MOD 30 MIN: CPT | Performed by: ORTHOPAEDIC SURGERY

## 2024-05-03 RX ORDER — CHLORHEXIDINE GLUCONATE ORAL RINSE 1.2 MG/ML
15 SOLUTION DENTAL ONCE
OUTPATIENT
Start: 2024-05-03 | End: 2024-05-03

## 2024-05-03 RX ORDER — CEFAZOLIN SODIUM 2 G/50ML
2000 SOLUTION INTRAVENOUS ONCE
OUTPATIENT
Start: 2024-05-03 | End: 2024-05-03

## 2024-05-03 NOTE — PROGRESS NOTES
Ortho Sports Medicine Shoulder New Patient Visit     Assesment:   59 y.o. female left shoulder AC joint osteoarthritis, rotator cuff tear    Plan:    Discussed surgical intervention, consent obtained at today's visit  MRI reviewed with patient at today's visit    Conservative treatment:    Let pain guide return to activities.  Over-the-counter medication as needed      Imaging:    All imaging from today was reviewed by myself and explained to the patient.       Injection:    No Injection planned at this time.      Surgery:     All of the risks and benefits of operative treatment were explained to the patient, as well as the risks and benefits of any alternative treatment options, including nonoperative care. This risks of surgery include, but are not limited to, infection, bleeding, blood clot, damage to nerves/arteries, need for further surgyer, cardiovascular risk, anesthesia risk, and continued pain.  The patient understood this and elects to proceed forward with surgical intervention.  We will proceed forward with revision distal clavicle excision.      Follow up:    No follow-ups on file.        Chief Complaint   Patient presents with    Left Shoulder - Follow-up       History of Present Illness:    The patient is a 59 y.o., right hand dominant female who presents for follow up for left shoulder pain. Since her last visit, she reports minimal improvement. She continues to have pain over the AC joint       Shoulder Surgical History:  Left shoulder distal clavicle excision, anterior acromioplasty and debridement of the subacromial bursa    Past Medical, Social and Family History:  Past Medical History:   Diagnosis Date    Anemia     Anxiety     Arthritis     Asthma     Bariatric surgery status     Depression     Disease of thyroid gland     hypothyroid    Edema     GERD (gastroesophageal reflux disease)     History of anemia     History of edema     Hypertension     Insomnia     Morbid obesity (HCC)     Obesity  1968    Pneumonia Once in 2015    Postgastrectomy malabsorption     Spinal stenosis      Past Surgical History:   Procedure Laterality Date    ANKLE SURGERY Bilateral     tendon repair    CARPAL TUNNEL RELEASE Bilateral     CHOLECYSTECTOMY      COLONOSCOPY      GASTRIC BYPASS      GASTRIC BYPASS LAPAROSCOPIC N/A 11/20/2018    Procedure: LAPAROSCOPIC REVISION OF MARBELLA-EN-Y BYPASS W/ROBOTICS & INTRAOP EGD;  Surgeon: Jackson Villalba MD;  Location: [facility];  Service: Bariatrics    JOINT REPLACEMENT  2002, 2011    Left hip, right knee    NEUROMA EXCISION      single Dewitt's neuroma    [place] EGD TRANSORAL BIOPSY SINGLE/MULTIPLE N/A 08/22/2018    Procedure: ESOPHAGOGASTRODUODENOSCOPY (EGD);  Surgeon: Jackson Villalba MD;  Location: [facility];  Service: Bariatrics    ROTATOR CUFF REPAIR      left    SHOULDER SURGERY      TONSILLECTOMY      TOTAL HIP ARTHROPLASTY Left     TOTAL KNEE ARTHROPLASTY Right      Allergies   Allergen Reactions    Amoxicillin Confusion, Delirium, Dizziness, Hives, Itching, Lightheadedness, Other (See Comments), Photosensitivity, Syncope and Visual Disturbance    Iodine - Food Allergy      IV dye  Uncontrolled sneezing      Medical Tape Hives     Steri strips     Meperidine     Sulfa Antibiotics Hives    Sulfate     Amlodipine Edema    Lisinopril Cough     Current Outpatient Medications on File Prior to Visit   Medication Sig Dispense Refill    albuterol (Ventolin HFA) 90 mcg/act inhaler Inhale 2 puffs every 6 (six) hours as needed for wheezing 18 g 0    busPIRone (BUSPAR) 7.5 mg tablet TAKE ONE TABLET BY MOUTH TWICE DAILY 180 tablet 3    escitalopram (LEXAPRO) 20 mg tablet TAKE 1 TABLET BY MOUTH DAILY 30 tablet 5    levothyroxine (Synthroid) 100 mcg tablet Take 1 tablet (100 mcg total) by mouth daily 90 tablet 1    LORazepam (ATIVAN) 1 mg tablet Take 1 tablet (1 mg total) by mouth 2 (two) times a day as needed for anxiety 60 tablet 3    olmesartan (BENICAR) 20 mg tablet TAKE 1 TABLET BY MOUTH DAILY  30 tablet 5    pantoprazole (PROTONIX) 40 mg tablet TAKE 1 TABLET BY MOUTH DAILY 30 tablet 0    phentermine (ADIPEX-P) 37.5 MG tablet TAKE 1 TABLET BY MOUTH DAILY 30 tablet 3    temazepam (RESTORIL) 15 mg capsule TAKE ONE OR TWO CAPSULES BY MOUTH DAILY AT BEDTIME AS NEEDED FOR INSOMNIA 60 capsule 3    Diclofenac Sodium (VOLTAREN) 1 % Apply 2 g topically 4 (four) times a day (Patient not taking: Reported on 4/8/2024) 50 g 1    Finacea 15 % FOAM       spironolactone (ALDACTONE) 50 mg tablet TAKE 1 TABLET BY MOUTH DAILY (Patient not taking: Reported on 4/8/2024) 30 tablet 5     No current facility-administered medications on file prior to visit.     Social History     Socioeconomic History    Marital status: /Civil Union     Spouse name: Not on file    Number of children: Not on file    Years of education: Not on file    Highest education level: Not on file   Occupational History    Not on file   Tobacco Use    Smoking status: Never    Smokeless tobacco: Never   Vaping Use    Vaping status: Never Used   Substance and Sexual Activity    Alcohol use: Yes     Alcohol/week: 10.0 standard drinks of alcohol     Types: 3 Glasses of wine, 7 Shots of liquor per week     Comment: social     Drug use: No    Sexual activity: Not Currently     Partners: Male     Birth control/protection: Post-menopausal   Other Topics Concern    Not on file   Social History Narrative    Not on file     Social Determinants of Health     Financial Resource Strain: Not on file   Food Insecurity: Not on file   Transportation Needs: Not on file   Physical Activity: Not on file   Stress: Not on file   Social Connections: Not on file   Intimate Partner Violence: Not on file   Housing Stability: Not on file         I have reviewed the past medical, surgical, social and family history, medications and allergies as documented in the EMR.    Review of systems: ROS is negative other than that noted in the HPI.  Constitutional: Negative for fatigue and  "fever.   HENT: Negative for sore throat.    Respiratory: Negative for shortness of breath.    Cardiovascular: Negative for chest pain.   Gastrointestinal: Negative for abdominal pain.   Endocrine: Negative for cold intolerance and heat intolerance.   Genitourinary: Negative for flank pain.   Musculoskeletal: Negative for back pain.   Skin: Negative for rash.   Allergic/Immunologic: Negative for immunocompromised state.   Neurological: Negative for dizziness.   Psychiatric/Behavioral: Negative for agitation.      Physical Exam:    Blood pressure (!) 182/101, pulse 72, height 5' 7.5\" (1.715 m).    General/Constitutional: NAD, well developed, well nourished  HENT: Normocephalic, atraumatic  CV: Intact distal pulses, regular rate  Resp: No respiratory distress or labored breathing  GI: Soft and non-tender   Lymphatic: No lymphadenopathy palpated  Neuro: Alert and Oriented x 3, no focal deficits  Psych: Normal mood, normal affect, normal judgement, normal behavior  Skin: Warm, dry, no rashes, no erythema      Shoulder focused exam:       RIGHT LEFT    Scapula Atrophy Negative Negative     Winging Negative Negative     Protraction Negative Negative    Rotator cuff SS 5/5 5/5     IS 5/5 5/5     SubS 5/5 5/5    ROM     170     ER0 60 60     ER90 90    90     IR90 T6    T6     IRb T6    T6    TTP: AC Negative Positive     Biceps Negative Negative     Coracoid Negative Negative    Special Tests: O'Briens Negative Negative     Teixeira-shear Negative Negative     Cross body Adduction Negative Negative     Speeds  Negative Negative     Geovani's Negative Negative     Whipple Negative Negative       Neer Negative Negative     Ulloa Negative Negative    Instability: Apprehension & relocation not tested not tested     Load & shift not tested not tested    Other: Crank Negative Negative                 UE NV Exam: +2 Radial pulses bilaterally  Sensation intact to light touch C5-T1 bilaterally, Radial/median/ulnar nerve motor " intact      Bilateral elbow, wrist, and and forearm ROM full, painless with passive ROM, no ttp or crepitance throughout extremities below shoulder joint    Cervical ROM is full without pain, numbness or tingling      Shoulder Imaging    MRI of the left shoulder were reviewed, which demonstrate prior rotator cuff repair with no full-thickness re-tear, clavicle spurring, glenohumeral osteoarthritis.  I have reviewed the radiology report and agree with their impression.      Scribe Attestation      I,:  Caleb Marcos am acting as a scribe while in the presence of the attending physician.:       I,:  Petey Laurent DO personally performed the services described in this documentation    as scribed in my presence.:

## 2024-05-06 ENCOUNTER — TELEPHONE (OUTPATIENT)
Dept: OBGYN CLINIC | Facility: MEDICAL CENTER | Age: 59
End: 2024-05-06

## 2024-05-06 NOTE — TELEPHONE ENCOUNTER
I tried calling the patient to see if she wanted to schedule her surgery. Patient was seen and was unable to stay to schedule surgery and I told her I would give her a call. No answer, I did leave a VM to give me a call back to place her on Dr. Laurent's schedule.

## 2024-05-07 DIAGNOSIS — E66.01 CLASS 2 SEVERE OBESITY DUE TO EXCESS CALORIES WITH SERIOUS COMORBIDITY AND BODY MASS INDEX (BMI) OF 35.0 TO 35.9 IN ADULT (HCC): ICD-10-CM

## 2024-05-08 ENCOUNTER — TELEPHONE (OUTPATIENT)
Dept: OBGYN CLINIC | Facility: MEDICAL CENTER | Age: 59
End: 2024-05-08

## 2024-05-08 NOTE — TELEPHONE ENCOUNTER
I tried calling the patient to see if she is still interested in scheduling surgery with Dr. Laurent. She had came in for an office visit and signed consent form but was unable to stay to schedule surgery. I did leave a VM to give me a call back to schedule her for surgery.

## 2024-05-08 NOTE — TELEPHONE ENCOUNTER
Medication:  PDMP     03/25/2024 09/27/2023 Phentermine Hcl (Tablet) 30.0 30 37.5 MG NA KINJAL SAMANIEGO     Active agreement on file -No

## 2024-05-10 RX ORDER — PHENTERMINE HYDROCHLORIDE 37.5 MG/1
TABLET ORAL
Qty: 30 TABLET | Refills: 0 | Status: SHIPPED | OUTPATIENT
Start: 2024-05-10

## 2024-05-16 ENCOUNTER — TELEPHONE (OUTPATIENT)
Age: 59
End: 2024-05-16

## 2024-05-16 DIAGNOSIS — E03.8 HYPOTHYROIDISM DUE TO HASHIMOTO'S THYROIDITIS: Primary | ICD-10-CM

## 2024-05-16 DIAGNOSIS — E06.3 HYPOTHYROIDISM DUE TO HASHIMOTO'S THYROIDITIS: Primary | ICD-10-CM

## 2024-05-16 RX ORDER — LEVOTHYROXINE SODIUM 0.12 MG/1
125 TABLET ORAL DAILY
Qty: 90 TABLET | Refills: 1 | Status: SHIPPED | OUTPATIENT
Start: 2024-05-16

## 2024-05-16 NOTE — TELEPHONE ENCOUNTER
PA for Phentermine (ADIPEX-P) 37.5 mg tablet    Submitted via    []CMM-KEY   [x][employer] A-Case ID # [id number]  []Faxed to plan   []Other website   []Phone call Case ID #     Office notes sent, clinical questions answered. Awaiting determination    Turnaround time for your insurance to make a decision on your Prior Authorization can take 7-21 business days.

## 2024-05-20 NOTE — TELEPHONE ENCOUNTER
PA for phentermine Denied    Reason:        Message sent to office clinical pool Yes    Denial letter scanned into Media Yes    Appeal started No ( Provider will need to decide if appeal is warranted and send clinical documentation to PA team for initiation.)    **Please follow up with your patient regarding denial and next steps**

## 2024-05-25 DIAGNOSIS — K21.9 GASTROESOPHAGEAL REFLUX DISEASE WITHOUT ESOPHAGITIS: ICD-10-CM

## 2024-05-25 RX ORDER — PANTOPRAZOLE SODIUM 40 MG/1
TABLET, DELAYED RELEASE ORAL
Qty: 30 TABLET | Refills: 0 | Status: SHIPPED | OUTPATIENT
Start: 2024-05-25

## 2024-06-23 DIAGNOSIS — K21.9 GASTROESOPHAGEAL REFLUX DISEASE WITHOUT ESOPHAGITIS: ICD-10-CM

## 2024-06-24 RX ORDER — PANTOPRAZOLE SODIUM 40 MG/1
TABLET, DELAYED RELEASE ORAL
Qty: 30 TABLET | Refills: 5 | Status: SHIPPED | OUTPATIENT
Start: 2024-06-24

## 2024-06-30 DIAGNOSIS — E66.01 CLASS 2 SEVERE OBESITY DUE TO EXCESS CALORIES WITH SERIOUS COMORBIDITY AND BODY MASS INDEX (BMI) OF 35.0 TO 35.9 IN ADULT (HCC): ICD-10-CM

## 2024-07-01 RX ORDER — PHENTERMINE HYDROCHLORIDE 37.5 MG/1
TABLET ORAL
Qty: 30 TABLET | Refills: 0 | Status: SHIPPED | OUTPATIENT
Start: 2024-07-01

## 2024-07-01 NOTE — TELEPHONE ENCOUNTER
Medication:  PDMP   05/10/2024 05/10/2024 Phentermine Hcl (Tablet) 30.0 30 37.5 MG NA KINJAL SAMANIEGO     Active agreement on file -No

## 2024-07-17 ENCOUNTER — VBI (OUTPATIENT)
Dept: ADMINISTRATIVE | Facility: OTHER | Age: 59
End: 2024-07-17

## 2024-07-17 NOTE — TELEPHONE ENCOUNTER
07/17/24 10:45 AM     Chart reviewed for CRC: Colonoscopy ; nothing is submitted to the patient's insurance at this time.     LAUREN MAHAJAN [facility]

## 2024-08-04 DIAGNOSIS — E66.01 CLASS 2 SEVERE OBESITY DUE TO EXCESS CALORIES WITH SERIOUS COMORBIDITY AND BODY MASS INDEX (BMI) OF 35.0 TO 35.9 IN ADULT (HCC): ICD-10-CM

## 2024-08-04 DIAGNOSIS — I10 ESSENTIAL HYPERTENSION: ICD-10-CM

## 2024-08-05 NOTE — TELEPHONE ENCOUNTER
Medication:  PDMP   07/02/2024 07/01/2024 Phentermine Hcl (Tablet) 30.0 30 37.5 MG NA KINJAL SAMANIEGO     Active agreement on file -No

## 2024-08-06 RX ORDER — OLMESARTAN MEDOXOMIL 20 MG/1
20 TABLET ORAL DAILY
Qty: 30 TABLET | Refills: 0 | Status: SHIPPED | OUTPATIENT
Start: 2024-08-06

## 2024-08-06 RX ORDER — PHENTERMINE HYDROCHLORIDE 37.5 MG/1
TABLET ORAL
Qty: 30 TABLET | Refills: 0 | Status: SHIPPED | OUTPATIENT
Start: 2024-08-06

## 2024-08-22 DIAGNOSIS — F41.9 ANXIETY: ICD-10-CM

## 2024-08-23 RX ORDER — LORAZEPAM 1 MG/1
1 TABLET ORAL 2 TIMES DAILY PRN
Qty: 60 TABLET | Refills: 3 | Status: SHIPPED | OUTPATIENT
Start: 2024-08-23

## 2024-08-23 NOTE — TELEPHONE ENCOUNTER
Medication:  PDMP   01/25/2024 11/02/2023 LORazepam (Tablet) 60.0 30 1 MG NA KINJAL SAMANIEGO     Active agreement on file -No

## 2024-09-10 DIAGNOSIS — I10 ESSENTIAL HYPERTENSION: ICD-10-CM

## 2024-09-10 DIAGNOSIS — F41.9 ANXIETY AND DEPRESSION: ICD-10-CM

## 2024-09-10 DIAGNOSIS — F32.A ANXIETY AND DEPRESSION: ICD-10-CM

## 2024-09-11 DIAGNOSIS — L70.0 CYSTIC ACNE: ICD-10-CM

## 2024-09-11 RX ORDER — OLMESARTAN MEDOXOMIL 20 MG/1
20 TABLET ORAL DAILY
Qty: 90 TABLET | Refills: 0 | Status: SHIPPED | OUTPATIENT
Start: 2024-09-11

## 2024-09-11 RX ORDER — BUSPIRONE HYDROCHLORIDE 7.5 MG/1
TABLET ORAL
Qty: 180 TABLET | Refills: 0 | Status: SHIPPED | OUTPATIENT
Start: 2024-09-11

## 2024-09-12 NOTE — TELEPHONE ENCOUNTER
Patient was called unable to reach a message was left to call the office in regards to a medication refill request for Spironolactone and need verification if patient is still taking medication.

## 2024-09-17 RX ORDER — SPIRONOLACTONE 50 MG/1
TABLET, FILM COATED ORAL
Qty: 30 TABLET | Refills: 5 | Status: SHIPPED | OUTPATIENT
Start: 2024-09-17

## 2024-10-03 DIAGNOSIS — F41.9 ANXIETY: ICD-10-CM

## 2024-10-03 DIAGNOSIS — E66.01 CLASS 2 SEVERE OBESITY DUE TO EXCESS CALORIES WITH SERIOUS COMORBIDITY AND BODY MASS INDEX (BMI) OF 35.0 TO 35.9 IN ADULT (HCC): ICD-10-CM

## 2024-10-03 DIAGNOSIS — E66.812 CLASS 2 SEVERE OBESITY DUE TO EXCESS CALORIES WITH SERIOUS COMORBIDITY AND BODY MASS INDEX (BMI) OF 35.0 TO 35.9 IN ADULT (HCC): ICD-10-CM

## 2024-10-04 RX ORDER — PHENTERMINE HYDROCHLORIDE 37.5 MG/1
TABLET ORAL
Qty: 30 TABLET | Refills: 0 | Status: SHIPPED | OUTPATIENT
Start: 2024-10-04

## 2024-10-04 RX ORDER — ESCITALOPRAM OXALATE 20 MG/1
20 TABLET ORAL DAILY
Qty: 30 TABLET | Refills: 3 | Status: SHIPPED | OUTPATIENT
Start: 2024-10-04

## 2024-10-04 NOTE — TELEPHONE ENCOUNTER
Medication:  PDMP     08/06/2024 08/06/2024 Phentermine Hcl (Tablet) 30.0 30 37.5 MG NA KINJAL SAMANIEGO     Active agreement on file -No

## 2024-10-21 LAB — TSH SERPL-ACNC: 1.76 UIU/ML (ref 0.45–5.33)

## 2024-11-01 ENCOUNTER — RA CDI HCC (OUTPATIENT)
Dept: OTHER | Facility: HOSPITAL | Age: 59
End: 2024-11-01

## 2024-11-05 ENCOUNTER — OFFICE VISIT (OUTPATIENT)
Dept: FAMILY MEDICINE CLINIC | Facility: CLINIC | Age: 59
End: 2024-11-05
Payer: COMMERCIAL

## 2024-11-05 VITALS
BODY MASS INDEX: 41.07 KG/M2 | HEIGHT: 68 IN | RESPIRATION RATE: 17 BRPM | DIASTOLIC BLOOD PRESSURE: 80 MMHG | HEART RATE: 96 BPM | SYSTOLIC BLOOD PRESSURE: 150 MMHG | WEIGHT: 271 LBS | TEMPERATURE: 98.6 F | OXYGEN SATURATION: 98 %

## 2024-11-05 DIAGNOSIS — J45.20 MILD INTERMITTENT EXTRINSIC ASTHMA WITHOUT COMPLICATION: ICD-10-CM

## 2024-11-05 DIAGNOSIS — R06.02 SOB (SHORTNESS OF BREATH): ICD-10-CM

## 2024-11-05 DIAGNOSIS — D50.8 IRON DEFICIENCY ANEMIA SECONDARY TO INADEQUATE DIETARY IRON INTAKE: ICD-10-CM

## 2024-11-05 DIAGNOSIS — R60.0 BILATERAL LEG EDEMA: Primary | ICD-10-CM

## 2024-11-05 DIAGNOSIS — M18.0 PRIMARY OSTEOARTHRITIS OF BOTH FIRST CARPOMETACARPAL JOINTS: ICD-10-CM

## 2024-11-05 DIAGNOSIS — K21.9 GASTROESOPHAGEAL REFLUX DISEASE WITHOUT ESOPHAGITIS: ICD-10-CM

## 2024-11-05 DIAGNOSIS — R73.09 ELEVATED GLUCOSE: ICD-10-CM

## 2024-11-05 DIAGNOSIS — E06.3 HYPOTHYROIDISM DUE TO HASHIMOTO'S THYROIDITIS: ICD-10-CM

## 2024-11-05 DIAGNOSIS — K91.2 POSTGASTRECTOMY MALABSORPTION: ICD-10-CM

## 2024-11-05 DIAGNOSIS — F32.A ANXIETY AND DEPRESSION: ICD-10-CM

## 2024-11-05 DIAGNOSIS — I10 ESSENTIAL HYPERTENSION: ICD-10-CM

## 2024-11-05 DIAGNOSIS — R05.1 ACUTE COUGH: ICD-10-CM

## 2024-11-05 DIAGNOSIS — F41.9 ANXIETY AND DEPRESSION: ICD-10-CM

## 2024-11-05 DIAGNOSIS — Z90.3 POSTGASTRECTOMY MALABSORPTION: ICD-10-CM

## 2024-11-05 PROBLEM — E66.812 CLASS 2 SEVERE OBESITY DUE TO EXCESS CALORIES WITH SERIOUS COMORBIDITY AND BODY MASS INDEX (BMI) OF 35.0 TO 35.9 IN ADULT (HCC): Status: RESOLVED | Noted: 2021-04-25 | Resolved: 2024-11-05

## 2024-11-05 PROBLEM — E66.01 CLASS 2 SEVERE OBESITY DUE TO EXCESS CALORIES WITH SERIOUS COMORBIDITY AND BODY MASS INDEX (BMI) OF 35.0 TO 35.9 IN ADULT (HCC): Status: RESOLVED | Noted: 2021-04-25 | Resolved: 2024-11-05

## 2024-11-05 PROBLEM — N94.10 DYSPAREUNIA IN FEMALE: Status: RESOLVED | Noted: 2019-09-16 | Resolved: 2024-11-05

## 2024-11-05 PROBLEM — D64.9 ANEMIA: Status: RESOLVED | Noted: 2018-10-17 | Resolved: 2024-11-05

## 2024-11-05 LAB — SL AMB POCT HEMOGLOBIN AIC: 5.6 (ref ?–6.5)

## 2024-11-05 PROCEDURE — 99215 OFFICE O/P EST HI 40 MIN: CPT | Performed by: NURSE PRACTITIONER

## 2024-11-05 PROCEDURE — 83036 HEMOGLOBIN GLYCOSYLATED A1C: CPT | Performed by: NURSE PRACTITIONER

## 2024-11-05 RX ORDER — MOMETASONE FUROATE 110 UG/1
2 INHALANT RESPIRATORY (INHALATION) 2 TIMES DAILY
Qty: 1 EACH | Refills: 5 | Status: SHIPPED | OUTPATIENT
Start: 2024-11-05

## 2024-11-05 RX ORDER — MOMETASONE FUROATE 110 UG/1
2 INHALANT RESPIRATORY (INHALATION) EVERY EVENING
Qty: 1 EACH | Refills: 5 | Status: SHIPPED | OUTPATIENT
Start: 2024-11-05

## 2024-11-05 RX ORDER — HYDROCHLOROTHIAZIDE 25 MG/1
25 TABLET ORAL DAILY
Qty: 30 TABLET | Refills: 3 | Status: SHIPPED | OUTPATIENT
Start: 2024-11-05

## 2024-11-05 NOTE — PROGRESS NOTES
FAMILY PRACTICE OFFICE VISIT       NAME: Gris Salmeron  AGE: 59 y.o. SEX: female       : 1965        MRN: [id number]    DATE: 2024  TIME: 1:05 PM    Assessment and Plan   1. Bilateral leg edema  -     POCT ECG  -     Comprehensive metabolic panel; Future  -     XR chest pa and lateral; Future; Expected date: 2024  -     CBC and differential; Future  -     TSH, 3rd generation with Free T4 reflex; Future  -     Vitamin B12; Future  -     Vitamin B1, whole blood; Future  -     Iron Panel (Includes Ferritin, Iron Sat%, Iron, and TIBC); Future  -     Zinc; Future  2. SOB (shortness of breath)  -     POCT ECG  -     Comprehensive metabolic panel; Future  -     XR chest pa and lateral; Future; Expected date: 2024  -     CBC and differential; Future  -     TSH, 3rd generation with Free T4 reflex; Future  -     Vitamin B12; Future  -     Vitamin B1, whole blood; Future  -     Iron Panel (Includes Ferritin, Iron Sat%, Iron, and TIBC); Future  -     Zinc; Future  -     hydroCHLOROthiazide 25 mg tablet; Take 1 tablet (25 mg total) by mouth daily  3. Acute cough  -     POCT ECG  -     Comprehensive metabolic panel; Future  -     CBC and differential; Future  -     TSH, 3rd generation with Free T4 reflex; Future  -     Vitamin B12; Future  -     Vitamin B1, whole blood; Future  -     Iron Panel (Includes Ferritin, Iron Sat%, Iron, and TIBC); Future  -     Zinc; Future  4. Anxiety and depression  Assessment & Plan:  Cont meds    Orders:  -     Comprehensive metabolic panel; Future  -     CBC and differential; Future  -     TSH, 3rd generation with Free T4 reflex; Future  -     Vitamin B12; Future  -     Vitamin B1, whole blood; Future  -     Iron Panel (Includes Ferritin, Iron Sat%, Iron, and TIBC); Future  -     Zinc; Future  5. Essential hypertension  Assessment & Plan:  Low sodium diet    Orders:  -     Comprehensive metabolic panel; Future  -     XR chest pa and lateral; Future; Expected date:  11/05/2024  -     CBC and differential; Future  -     TSH, 3rd generation with Free T4 reflex; Future  -     Vitamin B12; Future  -     Vitamin B1, whole blood; Future  -     Iron Panel (Includes Ferritin, Iron Sat%, Iron, and TIBC); Future  -     Zinc; Future  -     hydroCHLOROthiazide 25 mg tablet; Take 1 tablet (25 mg total) by mouth daily  6. Mild intermittent extrinsic asthma without complication  Assessment & Plan:  Refill meds    Orders:  -     Comprehensive metabolic panel; Future  -     CBC and differential; Future  -     TSH, 3rd generation with Free T4 reflex; Future  -     Vitamin B12; Future  -     Vitamin B1, whole blood; Future  -     Iron Panel (Includes Ferritin, Iron Sat%, Iron, and TIBC); Future  -     Zinc; Future  -     mometasone (Asmanex, 30 Metered Doses,) 110 mcg/actuation AEPB inhaler; Inhale 2 puffs 2 (two) times a day Rinse mouth after use.  7. Gastroesophageal reflux disease without esophagitis  Assessment & Plan:  stable  Orders:  -     Comprehensive metabolic panel; Future  -     CBC and differential; Future  -     TSH, 3rd generation with Free T4 reflex; Future  -     Vitamin B12; Future  -     Vitamin B1, whole blood; Future  -     Iron Panel (Includes Ferritin, Iron Sat%, Iron, and TIBC); Future  -     Zinc; Future  8. Primary osteoarthritis of both first carpometacarpal joints  -     Comprehensive metabolic panel; Future  -     CBC and differential; Future  -     TSH, 3rd generation with Free T4 reflex; Future  -     Vitamin B12; Future  -     Vitamin B1, whole blood; Future  -     Iron Panel (Includes Ferritin, Iron Sat%, Iron, and TIBC); Future  -     Zinc; Future  9. Elevated glucose  -     POCT hemoglobin A1c  -     Comprehensive metabolic panel; Future  -     CBC and differential; Future  -     TSH, 3rd generation with Free T4 reflex; Future  -     Vitamin B12; Future  -     Vitamin B1, whole blood; Future  -     Iron Panel (Includes Ferritin, Iron Sat%, Iron, and TIBC);  Future  -     Zinc; Future  10. Postgastrectomy malabsorption  Assessment & Plan:  Labs    Orders:  -     Comprehensive metabolic panel; Future  -     CBC and differential; Future  -     TSH, 3rd generation with Free T4 reflex; Future  -     Vitamin B12; Future  -     Vitamin B1, whole blood; Future  -     Iron Panel (Includes Ferritin, Iron Sat%, Iron, and TIBC); Future  -     Zinc; Future  11. Hypothyroidism due to Hashimoto's thyroiditis  Assessment & Plan:  Labs  Cont meds    Orders:  -     Comprehensive metabolic panel; Future  -     CBC and differential; Future  -     TSH, 3rd generation with Free T4 reflex; Future  -     Vitamin B12; Future  -     Vitamin B1, whole blood; Future  -     Iron Panel (Includes Ferritin, Iron Sat%, Iron, and TIBC); Future  -     Zinc; Future  12. Iron deficiency anemia secondary to inadequate dietary iron intake  -     Comprehensive metabolic panel; Future  -     CBC and differential; Future  -     TSH, 3rd generation with Free T4 reflex; Future  -     Vitamin B12; Future  -     Vitamin B1, whole blood; Future  -     Iron Panel (Includes Ferritin, Iron Sat%, Iron, and TIBC); Future  -     Zinc; Future       There are no Patient Instructions on file for this visit.        Chief Complaint     Chief Complaint   Patient presents with    Cough       History of Present Illness   Gris Salmeron is a 59 y.o.-year-old female who is here for cough  Increased heart rate  Elevated bp  Pitting edema  Thinks she may have chf\  Stress is very elevated  Drinking to much alcohol  Suppose to have left knee replacement    working for [employer], when not needed they make him take vacation time  Chiropractor will not work with her neck anymore  Neck pain is worse  Breasts heavy, causing neck pain        Answers submitted by the patient for this visit:  Cough Questionnaire (Submitted on 10/29/2024)  Chief Complaint: Cough  Chronicity: recurrent  Onset: more than 1 month  ago  Progression since onset: gradually improving  Frequency: hourly  Cough characteristics: productive of sputum  Aggravated by: exercise, stress    Review of Systems   Review of Systems   Constitutional:  Positive for activity change, appetite change, chills, diaphoresis and fatigue.   HENT:  Positive for congestion. Negative for postnasal drip, rhinorrhea, sinus pressure and sinus pain.    Eyes:  Negative for photophobia and visual disturbance.   Respiratory:  Positive for cough and shortness of breath.    Cardiovascular:  Positive for chest pain and palpitations.   Gastrointestinal:  Negative for abdominal pain, constipation, diarrhea, nausea and vomiting.   Genitourinary:  Negative for dysuria and frequency.   Musculoskeletal:  Positive for arthralgias, joint swelling and myalgias.   Skin:  Negative for rash.   Neurological:  Negative for dizziness, light-headedness and headaches.   Psychiatric/Behavioral:  Positive for decreased concentration, dysphoric mood and sleep disturbance. Negative for suicidal ideas. The patient is nervous/anxious.        Active Problem List     Patient Active Problem List   Diagnosis    Anxiety and depression    Asthma, well controlled    Cystic acne    Insomnia    Spinal stenosis    S/P bariatric surgery    Gastroesophageal reflux disease    Extrinsic asthma    Postgastrectomy malabsorption    Anxiety    Hypothyroidism due to Hashimoto's thyroiditis    Essential hypertension    Acute pain of left shoulder    Primary osteoarthritis of both first carpometacarpal joints    Bilateral leg edema    SOB (shortness of breath)    Acute cough    Elevated glucose         Past Medical History:  Past Medical History:   Diagnosis Date    Anemia     Anxiety     Arthritis     Asthma     Bariatric surgery status     Depression     Disease of thyroid gland     hypothyroid    Edema     GERD (gastroesophageal reflux disease)     History of anemia     History of edema     Hypertension     Insomnia      Morbid obesity (HCC)     Obesity 1968    Pneumonia Once in 2015    Postgastrectomy malabsorption     Spinal stenosis        Past Surgical History:  Past Surgical History:   Procedure Laterality Date    ANKLE SURGERY Bilateral     tendon repair    CARPAL TUNNEL RELEASE Bilateral     CHOLECYSTECTOMY      COLONOSCOPY      GASTRIC BYPASS      GASTRIC BYPASS LAPAROSCOPIC N/A 11/20/2018    Procedure: LAPAROSCOPIC REVISION OF MARBELLA-EN-Y BYPASS W/ROBOTICS & INTRAOP EGD;  Surgeon: Jackson Villalba MD;  Location: [facility];  Service: Bariatrics    JOINT REPLACEMENT  2002, 2011    Left hip, right knee    NEUROMA EXCISION      single Dewitt's neuroma    CT EGD TRANSORAL BIOPSY SINGLE/MULTIPLE N/A 08/22/2018    Procedure: ESOPHAGOGASTRODUODENOSCOPY (EGD);  Surgeon: Jackson Villalba MD;  Location: [facility];  Service: Bariatrics    ROTATOR CUFF REPAIR      left    SHOULDER SURGERY      TONSILLECTOMY      TOTAL HIP ARTHROPLASTY Left     TOTAL KNEE ARTHROPLASTY Right        Family History:  Family History   Problem Relation Age of Onset    Hypertension Mother     Other Mother         cardiac disorder    Diabetes type II Mother         without complication, with long term use of insulin    Depression Mother         Treated with Zoloft    Heart disease Mother     Diabetes Mother     Thyroid disease Mother     COPD Mother     Arthritis Mother     Anxiety disorder Mother     Depression Father         Suicided in 1990    Anxiety disorder Father     Completed Suicide  Father     No Known Problems Maternal Grandmother     No Known Problems Maternal Grandfather     Arthritis Paternal Grandmother     No Known Problems Paternal Grandfather     No Known Problems Maternal Aunt     No Known Problems Maternal Aunt     No Known Problems Maternal Aunt     Arthritis Cousin        Social History:  Social History     Socioeconomic History    Marital status: /Civil Union     Spouse name: Not on file    Number of children: Not on file    Years of  education: Not on file    Highest education level: Not on file   Occupational History    Not on file   Tobacco Use    Smoking status: Never    Smokeless tobacco: Never   Vaping Use    Vaping status: Never Used   Substance and Sexual Activity    Alcohol use: Yes     Alcohol/week: 10.0 standard drinks of alcohol     Types: 3 Glasses of wine, 7 Shots of liquor per week     Comment: social     Drug use: No    Sexual activity: Not Currently     Partners: Male     Birth control/protection: Post-menopausal   Other Topics Concern    Not on file   Social History Narrative    Not on file     Social Drivers of Health     Financial Resource Strain: Not on file   Food Insecurity: Not on file   Transportation Needs: Not on file   Physical Activity: Not on file   Stress: Not on file   Social Connections: Not on file   Intimate Partner Violence: Not on file   Housing Stability: Not on file       Objective     Vitals:    11/05/24 1115   BP: 150/80   Pulse: 96   Resp: 17   Temp: 98.6 °F (37 °C)   SpO2: 98%     Wt Readings from Last 3 Encounters:   11/05/24 123 kg (271 lb)   04/08/24 112 kg (246 lb)   01/20/23 112 kg (246 lb 3.2 oz)       Physical Exam  Vitals and nursing note reviewed.   Constitutional:       Appearance: Normal appearance. She is obese.   HENT:      Head: Normocephalic and atraumatic.      Right Ear: Tympanic membrane, ear canal and external ear normal.      Left Ear: Tympanic membrane, ear canal and external ear normal.      Nose: Nose normal.      Mouth/Throat:      Mouth: Mucous membranes are moist.   Eyes:      Conjunctiva/sclera: Conjunctivae normal.   Cardiovascular:      Rate and Rhythm: Normal rate and regular rhythm.      Pulses: Normal pulses.      Heart sounds: Normal heart sounds.   Pulmonary:      Breath sounds: Normal breath sounds.   Abdominal:      General: Bowel sounds are normal.   Musculoskeletal:      Cervical back: Normal range of motion and neck supple.      Right lower leg: Edema present.       "Left lower leg: Edema present.   Skin:     General: Skin is warm and dry.      Capillary Refill: Capillary refill takes less than 2 seconds.   Neurological:      General: No focal deficit present.      Mental Status: She is alert and oriented to person, place, and time.   Psychiatric:         Mood and Affect: Mood normal.         Behavior: Behavior normal.         Thought Content: Thought content normal.         Judgment: Judgment normal.         Pertinent Laboratory/Diagnostic Studies:  Lab Results   Component Value Date    BUN 22 04/20/2024    CREATININE 1.06 04/20/2024    CALCIUM 9.2 04/20/2024    K 5.0 04/20/2024    CO2 29 04/20/2024     04/20/2024     Lab Results   Component Value Date    ALT 40 04/20/2024    AST 29 04/20/2024    ALKPHOS 91 04/20/2024       Lab Results   Component Value Date    WBC 6.2 04/20/2024    HGB 12.9 04/20/2024    HCT 38.6 04/20/2024     04/20/2024     04/20/2024       Lab Results   Component Value Date    TSH 1.76 10/21/2024       No results found for: \"CHOL\"  Lab Results   Component Value Date    TRIG 108 04/20/2024     Lab Results   Component Value Date    HDL 89 04/20/2024     Lab Results   Component Value Date    LDLCALC 62 04/20/2024     Lab Results   Component Value Date    HGBA1C 5.6 11/05/2024       Results for orders placed or performed in visit on 11/05/24   POCT hemoglobin A1c    Collection Time: 11/05/24 12:27 PM   Result Value Ref Range    Hemoglobin A1C 5.6 <=6.5       Orders Placed This Encounter   Procedures    XR chest pa and lateral    Comprehensive metabolic panel    CBC and differential    TSH, 3rd generation with Free T4 reflex    Vitamin B12    Vitamin B1, whole blood    Zinc    POCT hemoglobin A1c    POCT ECG       ALLERGIES:  Allergies   Allergen Reactions    Amoxicillin Confusion, Delirium, Dizziness, Hives, Itching, Lightheadedness, Other (See Comments), Photosensitivity, Syncope and Visual Disturbance    Iodine - Food Allergy      IV dye  " Uncontrolled sneezing      Medical Tape Hives     Steri strips     Meperidine     Sulfa Antibiotics Hives    Sulfate     Amlodipine Edema    Lisinopril Cough       Current Medications     Current Outpatient Medications   Medication Sig Dispense Refill    albuterol (Ventolin HFA) 90 mcg/act inhaler Inhale 2 puffs every 6 (six) hours as needed for wheezing 18 g 0    busPIRone (BUSPAR) 7.5 mg tablet TAKE ONE TABLET BY MOUTH TWICE DAILY 180 tablet 0    escitalopram (LEXAPRO) 20 mg tablet TAKE 1 TABLET BY MOUTH DAILY 30 tablet 3    hydroCHLOROthiazide 25 mg tablet Take 1 tablet (25 mg total) by mouth daily 30 tablet 3    levothyroxine (Synthroid) 125 mcg tablet Take 1 tablet (125 mcg total) by mouth daily 90 tablet 1    LORazepam (ATIVAN) 1 mg tablet TAKE ONE TABLET BY MOUTH TWICE DAILY AS NEEDED FOR ANXIETY 60 tablet 3    mometasone (Asmanex, 30 Metered Doses,) 110 mcg/actuation AEPB inhaler Inhale 2 puffs 2 (two) times a day Rinse mouth after use. 1 each 5    olmesartan (BENICAR) 20 mg tablet TAKE 1 TABLET BY MOUTH DAILY 90 tablet 0    pantoprazole (PROTONIX) 40 mg tablet TAKE 1 TABLET BY MOUTH DAILY 30 tablet 5    spironolactone (ALDACTONE) 50 mg tablet TAKE 1 TABLET BY MOUTH DAILY (Patient taking differently: 50 mg 2 (two) times a day) 30 tablet 5    Diclofenac Sodium (VOLTAREN) 1 % Apply 2 g topically 4 (four) times a day (Patient not taking: Reported on 4/8/2024) 50 g 1    mometasone (Asmanex, 30 Metered Doses,) 110 mcg/actuation AEPB inhaler Inhale 2 puffs every evening Rinse mouth after use. 1 each 5    phentermine (ADIPEX-P) 37.5 MG tablet TAKE 1 TABLET BY MOUTH DAILY 30 tablet 0    phentermine (ADIPEX-P) 37.5 MG tablet TAKE 1 TABLET BY MOUTH DAILY 30 tablet 0    temazepam (RESTORIL) 15 mg capsule TAKE 1 OR 2 CAPSULES BY MOUTH DAILY AT BEDTIME AS NEEDED FOR INSOMNIA 60 capsule 0     No current facility-administered medications for this visit.         Health Maintenance     Health Maintenance   Topic Date Due     HIV Screening  Never done    Zoster Vaccine (1 of 2) Never done    Breast Cancer Screening: Mammogram  07/19/2020    Influenza Vaccine (1) Never done    COVID-19 Vaccine (4 - 2024-25 season) 09/01/2024    Cervical Cancer Screening  09/13/2024    Annual Physical  01/26/2025    Hepatitis C Screening  01/26/2025 (Originally 1965)    Depression Screening  11/05/2025    Colorectal Cancer Screening  10/17/2026    DTaP,Tdap,and Td Vaccines (2 - Td or Tdap) 04/23/2031    RSV Vaccine Age 60+ Years (1 - 1-dose 75+ series) 04/28/2040    RSV Vaccine age 0-20 Months  Aged Out    HIB Vaccine  Aged Out    IPV Vaccine  Aged Out    Hepatitis A Vaccine  Aged Out    Meningococcal ACWY Vaccine  Aged Out    HPV Vaccine  Aged Out    Pneumococcal Vaccine: Pediatrics (0 to 5 Years) and At-Risk Patients (6 to 64 Years)  Discontinued     Immunization History   Administered Date(s) Administered    COVID-19 PFIZER VACCINE 0.3 ML IM 03/10/2021, 04/01/2021, 12/06/2021    Tdap 04/23/2021       Depression Screening and Follow-up Plan: Patient was screened for depression during today's encounter. They screened negative with a PHQ-9 score of 0.    I have spent a total time of 40 minutes in caring for this patient on the day of the visit/encounter including Diagnostic results, Prognosis, Risks and benefits of tx options, Instructions for management, Patient and family education, Importance of tx compliance, Risk factor reductions, Impressions, Counseling / Coordination of care, Documenting in the medical record, Reviewing / ordering tests, medicine, procedures  , and Obtaining or reviewing history  .     FRANCISCO J Chandler

## 2024-11-10 DIAGNOSIS — E66.01 CLASS 2 SEVERE OBESITY DUE TO EXCESS CALORIES WITH SERIOUS COMORBIDITY AND BODY MASS INDEX (BMI) OF 35.0 TO 35.9 IN ADULT (HCC): ICD-10-CM

## 2024-11-10 DIAGNOSIS — F51.01 PRIMARY INSOMNIA: ICD-10-CM

## 2024-11-10 DIAGNOSIS — E66.812 CLASS 2 SEVERE OBESITY DUE TO EXCESS CALORIES WITH SERIOUS COMORBIDITY AND BODY MASS INDEX (BMI) OF 35.0 TO 35.9 IN ADULT (HCC): ICD-10-CM

## 2024-11-11 DIAGNOSIS — E66.812 CLASS 2 SEVERE OBESITY DUE TO EXCESS CALORIES WITH SERIOUS COMORBIDITY AND BODY MASS INDEX (BMI) OF 35.0 TO 35.9 IN ADULT (HCC): ICD-10-CM

## 2024-11-11 DIAGNOSIS — E66.01 CLASS 2 SEVERE OBESITY DUE TO EXCESS CALORIES WITH SERIOUS COMORBIDITY AND BODY MASS INDEX (BMI) OF 35.0 TO 35.9 IN ADULT (HCC): ICD-10-CM

## 2024-11-12 NOTE — TELEPHONE ENCOUNTER
Medication:  PDMP   10/04/2024 10/04/2024 Phentermine Hcl (Tablet) 30.0 30 37.5 MG SUNI SAMANIEGO    09/06/2024 05/10/2024 Temazepam (Capsule) 60.0 30 15 MG SUNI SAMANIEGO      Active agreement on file -No

## 2024-11-13 RX ORDER — PHENTERMINE HYDROCHLORIDE 37.5 MG/1
TABLET ORAL
Qty: 30 TABLET | Refills: 0 | Status: SHIPPED | OUTPATIENT
Start: 2024-11-13

## 2024-11-13 RX ORDER — TEMAZEPAM 15 MG/1
CAPSULE ORAL
Qty: 60 CAPSULE | Refills: 0 | Status: SHIPPED | OUTPATIENT
Start: 2024-11-13

## 2024-11-13 NOTE — TELEPHONE ENCOUNTER
Requested medication(s) are due for refill today: no   Patient has already received a courtesy refill: No  Other reason request has been forwarded to provider: PLEASE REFUSE DUPLICATE

## 2024-11-14 NOTE — TELEPHONE ENCOUNTER
Rozina for the pharmacy called refill line stating they received 2 scripts for Phentermine and they wanted to confirm was it a duplicate script sent by accident or is the pt supposed to be on both I don't see one of them having a future date so I am unsure. I told her I believe pt should only be on one script but I would have someone from the office call them to confirm. Please call the pharmacy at [phone] and speak to a pharmacist.

## 2024-11-17 PROCEDURE — 93000 ELECTROCARDIOGRAM COMPLETE: CPT | Performed by: NURSE PRACTITIONER

## 2024-11-18 ENCOUNTER — TELEPHONE (OUTPATIENT)
Dept: FAMILY MEDICINE CLINIC | Facility: CLINIC | Age: 59
End: 2024-11-18

## 2024-11-18 NOTE — TELEPHONE ENCOUNTER
Pharmacy called the RX Refill Line. Message is being forwarded to the office.     Pharmacist is requesting verification on 3 medications they were sent on 11/13/24. They stated they were sent over at 9:30 pm and controlled medications should be sent during office hours. Advised the provider was the one who signed off on these but they want further confirmation.     Please contact pharmacy.

## 2024-11-20 NOTE — TELEPHONE ENCOUNTER
[facility] pharmacy call the Rxline again and needs a call back from the office about the 2 prescriptions that were sent on 11/13/24.

## 2024-12-05 PROBLEM — R05.1 ACUTE COUGH: Status: RESOLVED | Noted: 2024-11-05 | Resolved: 2024-12-05

## 2024-12-13 DIAGNOSIS — I10 ESSENTIAL HYPERTENSION: ICD-10-CM

## 2024-12-14 RX ORDER — OLMESARTAN MEDOXOMIL 20 MG/1
20 TABLET ORAL DAILY
Qty: 90 TABLET | Refills: 0 | Status: SHIPPED | OUTPATIENT
Start: 2024-12-14

## 2024-12-18 DIAGNOSIS — I10 ESSENTIAL HYPERTENSION: Primary | ICD-10-CM

## 2024-12-18 DIAGNOSIS — R60.0 BILATERAL LEG EDEMA: ICD-10-CM

## 2024-12-18 RX ORDER — FUROSEMIDE 20 MG/1
20 TABLET ORAL DAILY
Qty: 30 TABLET | Refills: 1 | Status: SHIPPED | OUTPATIENT
Start: 2024-12-18

## 2025-01-03 DIAGNOSIS — F41.9 ANXIETY AND DEPRESSION: ICD-10-CM

## 2025-01-03 DIAGNOSIS — F32.A ANXIETY AND DEPRESSION: ICD-10-CM

## 2025-01-04 RX ORDER — BUSPIRONE HYDROCHLORIDE 7.5 MG/1
7.5 TABLET ORAL 2 TIMES DAILY
Qty: 180 TABLET | Refills: 0 | Status: SHIPPED | OUTPATIENT
Start: 2025-01-04

## 2025-01-22 ENCOUNTER — TELEPHONE (OUTPATIENT)
Age: 60
End: 2025-01-22

## 2025-01-22 NOTE — TELEPHONE ENCOUNTER
Patient called stating she believes she caught a stomach bug due to symptoms of nausea, vomiting, and diarrhea. Patient stated she is unable to go in for an office visit due to how bad she feels and wanted to know if Karol could write her a work note excusing her for tomorrow and Friday? Patient stated her boss is requiring a note to return. Please call patient back.

## 2025-02-04 DIAGNOSIS — I10 ESSENTIAL HYPERTENSION: ICD-10-CM

## 2025-02-04 DIAGNOSIS — F41.9 ANXIETY AND DEPRESSION: ICD-10-CM

## 2025-02-04 DIAGNOSIS — L70.0 CYSTIC ACNE: ICD-10-CM

## 2025-02-04 DIAGNOSIS — E66.812 CLASS 2 SEVERE OBESITY DUE TO EXCESS CALORIES WITH SERIOUS COMORBIDITY AND BODY MASS INDEX (BMI) OF 35.0 TO 35.9 IN ADULT (HCC): ICD-10-CM

## 2025-02-04 DIAGNOSIS — E66.01 CLASS 2 SEVERE OBESITY DUE TO EXCESS CALORIES WITH SERIOUS COMORBIDITY AND BODY MASS INDEX (BMI) OF 35.0 TO 35.9 IN ADULT (HCC): ICD-10-CM

## 2025-02-04 DIAGNOSIS — F41.9 ANXIETY: ICD-10-CM

## 2025-02-04 DIAGNOSIS — F32.A ANXIETY AND DEPRESSION: ICD-10-CM

## 2025-02-04 DIAGNOSIS — J45.20 MILD INTERMITTENT EXTRINSIC ASTHMA WITHOUT COMPLICATION: ICD-10-CM

## 2025-02-04 DIAGNOSIS — K21.9 GASTROESOPHAGEAL REFLUX DISEASE WITHOUT ESOPHAGITIS: ICD-10-CM

## 2025-02-04 DIAGNOSIS — E06.3 HYPOTHYROIDISM DUE TO HASHIMOTO'S THYROIDITIS: ICD-10-CM

## 2025-02-04 DIAGNOSIS — R60.0 BILATERAL LEG EDEMA: ICD-10-CM

## 2025-02-04 DIAGNOSIS — J45.20 ASTHMA, MILD INTERMITTENT, WELL-CONTROLLED: ICD-10-CM

## 2025-02-04 RX ORDER — BUSPIRONE HYDROCHLORIDE 7.5 MG/1
7.5 TABLET ORAL 2 TIMES DAILY
Qty: 180 TABLET | Refills: 1 | Status: SHIPPED | OUTPATIENT
Start: 2025-02-04

## 2025-02-04 RX ORDER — ESCITALOPRAM OXALATE 20 MG/1
20 TABLET ORAL DAILY
Qty: 30 TABLET | Refills: 3 | Status: SHIPPED | OUTPATIENT
Start: 2025-02-04

## 2025-02-04 RX ORDER — SPIRONOLACTONE 50 MG/1
50 TABLET, FILM COATED ORAL DAILY
Qty: 30 TABLET | Refills: 5 | Status: SHIPPED | OUTPATIENT
Start: 2025-02-04

## 2025-02-04 RX ORDER — LEVOTHYROXINE SODIUM 125 UG/1
125 TABLET ORAL DAILY
Qty: 90 TABLET | Refills: 1 | Status: SHIPPED | OUTPATIENT
Start: 2025-02-04

## 2025-02-04 RX ORDER — MOMETASONE FUROATE 110 UG/1
2 INHALANT RESPIRATORY (INHALATION) 2 TIMES DAILY
Qty: 1 EACH | Refills: 5 | Status: SHIPPED | OUTPATIENT
Start: 2025-02-04

## 2025-02-04 RX ORDER — FUROSEMIDE 20 MG/1
20 TABLET ORAL DAILY
Qty: 30 TABLET | Refills: 1 | Status: SHIPPED | OUTPATIENT
Start: 2025-02-04

## 2025-02-04 RX ORDER — OLMESARTAN MEDOXOMIL 20 MG/1
20 TABLET ORAL DAILY
Qty: 90 TABLET | Refills: 1 | Status: SHIPPED | OUTPATIENT
Start: 2025-02-04

## 2025-02-04 RX ORDER — PANTOPRAZOLE SODIUM 40 MG/1
40 TABLET, DELAYED RELEASE ORAL DAILY
Qty: 30 TABLET | Refills: 5 | Status: SHIPPED | OUTPATIENT
Start: 2025-02-04

## 2025-02-04 RX ORDER — PHENTERMINE HYDROCHLORIDE 37.5 MG/1
37.5 TABLET ORAL DAILY
Qty: 30 TABLET | Refills: 2 | Status: SHIPPED | OUTPATIENT
Start: 2025-02-04 | End: 2025-02-07

## 2025-02-04 RX ORDER — ALBUTEROL SULFATE 90 UG/1
2 INHALANT RESPIRATORY (INHALATION) EVERY 6 HOURS PRN
Qty: 18 G | Refills: 1 | Status: SHIPPED | OUTPATIENT
Start: 2025-02-04

## 2025-02-04 NOTE — TELEPHONE ENCOUNTER
Message sent via [employer].   Hello. I would like to transfer ALL non-controlled prescriptions to:     [facility]  [address] [address]     I tried to do this online, but [employer] blocked the transfer. [employer] informed me I had to reach out to you to have scripts sent directly.      The two controlleds - Lorazepam and Temazepam - should be sent to the [facility] on [address] that is already in my file.      Please let me know if you have any questions. Thanks so much for your help.      Gris

## 2025-02-04 NOTE — TELEPHONE ENCOUNTER
Medication:  PDMP   11/20/2024 11/13/2024 Phentermine Hcl (Tablet) 30.0 30 37.5 MG NA KINJAL SAMANIEGO     Active agreement on file -No

## 2025-02-07 ENCOUNTER — TELEPHONE (OUTPATIENT)
Age: 60
End: 2025-02-07

## 2025-02-07 RX ORDER — PHENTERMINE HYDROCHLORIDE 37.5 MG/1
37.5 TABLET ORAL DAILY
Qty: 30 TABLET | Refills: 2 | Status: SHIPPED | OUTPATIENT
Start: 2025-02-07

## 2025-02-07 NOTE — TELEPHONE ENCOUNTER
PA for ADIPEX 37.5MG SUBMITTED to [employer]    via    [x][employer]-Case ID #     [x]PA sent as URGENT    All office notes, labs and other pertaining documents and studies sent. Clinical questions answered. Awaiting determination from insurance company.     Turnaround time for your insurance to make a decision on your Prior Authorization can take 7-21 business days.

## 2025-02-14 NOTE — TELEPHONE ENCOUNTER
PA for ADIPEX 37.5MG DENIED    Reason:(Screenshot if applicable)  SCANNED IN MEDIA        Message sent to office clinical pool Yes    Denial letter scanned into Media Yes    Appeal started No (Provider will need to decide if appeal is warranted and send clinical documentation to Prior Authorization Team for initiation.)    **Please follow up with your patient regarding denial and next steps**

## 2025-03-17 DIAGNOSIS — F41.9 ANXIETY: ICD-10-CM

## 2025-03-17 DIAGNOSIS — F51.01 PRIMARY INSOMNIA: ICD-10-CM

## 2025-03-17 RX ORDER — TEMAZEPAM 15 MG/1
CAPSULE ORAL
Qty: 60 CAPSULE | Refills: 3 | Status: SHIPPED | OUTPATIENT
Start: 2025-03-17 | End: 2025-03-24 | Stop reason: SDUPTHER

## 2025-03-17 RX ORDER — LORAZEPAM 1 MG/1
1 TABLET ORAL 2 TIMES DAILY PRN
Qty: 60 TABLET | Refills: 3 | Status: SHIPPED | OUTPATIENT
Start: 2025-03-17 | End: 2025-03-24 | Stop reason: SDUPTHER

## 2025-03-20 DIAGNOSIS — R60.0 BILATERAL LEG EDEMA: ICD-10-CM

## 2025-03-20 DIAGNOSIS — I10 ESSENTIAL HYPERTENSION: ICD-10-CM

## 2025-03-21 RX ORDER — FUROSEMIDE 20 MG/1
20 TABLET ORAL DAILY
Qty: 90 TABLET | Refills: 1 | Status: SHIPPED | OUTPATIENT
Start: 2025-03-21

## 2025-03-24 DIAGNOSIS — F51.01 PRIMARY INSOMNIA: ICD-10-CM

## 2025-03-24 DIAGNOSIS — F41.9 ANXIETY: ICD-10-CM

## 2025-03-24 RX ORDER — LORAZEPAM 1 MG/1
1 TABLET ORAL 2 TIMES DAILY PRN
Qty: 60 TABLET | Refills: 3 | Status: SHIPPED | OUTPATIENT
Start: 2025-03-24

## 2025-03-24 RX ORDER — TEMAZEPAM 15 MG/1
CAPSULE ORAL
Qty: 60 CAPSULE | Refills: 3 | Status: SHIPPED | OUTPATIENT
Start: 2025-03-24

## 2025-04-07 ENCOUNTER — VBI (OUTPATIENT)
Dept: ADMINISTRATIVE | Facility: OTHER | Age: 60
End: 2025-04-07

## 2025-04-07 NOTE — TELEPHONE ENCOUNTER
04/07/25 9:40 AM     Chart reviewed for   Cervical Cancer Screening    ; nothing is submitted to the patient's insurance at this time.     LAUREN MAHAJAN MA   [facility]

## 2025-05-03 DIAGNOSIS — E06.3 HYPOTHYROIDISM DUE TO HASHIMOTO'S THYROIDITIS: ICD-10-CM

## 2025-05-04 RX ORDER — LEVOTHYROXINE SODIUM 125 UG/1
125 TABLET ORAL DAILY
Qty: 90 TABLET | Refills: 0 | Status: SHIPPED | OUTPATIENT
Start: 2025-05-04 | End: 2025-05-11

## 2025-05-10 DIAGNOSIS — F41.9 ANXIETY: ICD-10-CM

## 2025-05-11 DIAGNOSIS — E06.3 HYPOTHYROIDISM DUE TO HASHIMOTO'S THYROIDITIS: ICD-10-CM

## 2025-05-11 DIAGNOSIS — I10 ESSENTIAL HYPERTENSION: ICD-10-CM

## 2025-05-11 RX ORDER — LEVOTHYROXINE SODIUM 125 UG/1
125 TABLET ORAL DAILY
Qty: 90 TABLET | Refills: 1 | Status: SHIPPED | OUTPATIENT
Start: 2025-05-11

## 2025-05-11 RX ORDER — ESCITALOPRAM OXALATE 20 MG/1
20 TABLET ORAL DAILY
Qty: 30 TABLET | Refills: 5 | Status: SHIPPED | OUTPATIENT
Start: 2025-05-11

## 2025-05-12 RX ORDER — OLMESARTAN MEDOXOMIL 20 MG/1
20 TABLET ORAL DAILY
Qty: 90 TABLET | Refills: 0 | Status: SHIPPED | OUTPATIENT
Start: 2025-05-12

## 2025-05-13 DIAGNOSIS — F41.9 ANXIETY: ICD-10-CM

## 2025-05-13 DIAGNOSIS — F41.9 ANXIETY AND DEPRESSION: ICD-10-CM

## 2025-05-13 DIAGNOSIS — F51.01 PRIMARY INSOMNIA: ICD-10-CM

## 2025-05-13 DIAGNOSIS — F32.A ANXIETY AND DEPRESSION: ICD-10-CM

## 2025-05-13 RX ORDER — LORAZEPAM 1 MG/1
1 TABLET ORAL 2 TIMES DAILY PRN
Qty: 60 TABLET | Refills: 3 | Status: SHIPPED | OUTPATIENT
Start: 2025-05-13

## 2025-05-13 RX ORDER — TEMAZEPAM 15 MG/1
CAPSULE ORAL
Qty: 60 CAPSULE | Refills: 3 | Status: SHIPPED | OUTPATIENT
Start: 2025-05-13

## 2025-05-13 RX ORDER — BUSPIRONE HYDROCHLORIDE 7.5 MG/1
7.5 TABLET ORAL 2 TIMES DAILY
Qty: 180 TABLET | Refills: 1 | Status: SHIPPED | OUTPATIENT
Start: 2025-05-13

## 2025-06-27 DIAGNOSIS — L70.0 CYSTIC ACNE: ICD-10-CM

## 2025-06-30 RX ORDER — SPIRONOLACTONE 50 MG/1
50 TABLET, FILM COATED ORAL DAILY
Qty: 30 TABLET | Refills: 4 | Status: SHIPPED | OUTPATIENT
Start: 2025-06-30

## 2025-07-30 DIAGNOSIS — K21.9 GASTROESOPHAGEAL REFLUX DISEASE WITHOUT ESOPHAGITIS: ICD-10-CM

## 2025-07-31 RX ORDER — PANTOPRAZOLE SODIUM 40 MG/1
40 TABLET, DELAYED RELEASE ORAL DAILY
Qty: 30 TABLET | Refills: 4 | Status: SHIPPED | OUTPATIENT
Start: 2025-07-31

## 2025-08-06 DIAGNOSIS — I10 ESSENTIAL HYPERTENSION: ICD-10-CM

## 2025-08-06 DIAGNOSIS — R60.0 BILATERAL LEG EDEMA: ICD-10-CM

## 2025-08-06 RX ORDER — FUROSEMIDE 20 MG/1
20 TABLET ORAL DAILY
Qty: 90 TABLET | Refills: 0 | Status: SHIPPED | OUTPATIENT
Start: 2025-08-06

## (undated) DEVICE — SUT ETHILON 2-0 FS 18 IN 664H

## (undated) DEVICE — VISIGI 3D®  CALIBRATION SYSTEM  SIZE 36FR SLEEVE/STD: Brand: BOEHRINGER® VISIGI 3D™ SLEEVE GASTRECTOMY CALIBRATION SYSTEM, SIZE 36FR

## (undated) DEVICE — VIOLET BRAIDED (POLYGLACTIN 910), SYNTHETIC ABSORBABLE SUTURE: Brand: COATED VICRYL

## (undated) DEVICE — ENDOPATH XCEL BLADELESS TROCARS WITH STABILITY SLEEVES: Brand: ENDOPATH XCEL

## (undated) DEVICE — GLOVE SRG BIOGEL 8

## (undated) DEVICE — PMI DISPOSABLE PUNCTURE CLOSURE DEVICE / SUTURE GRASPER: Brand: PMI

## (undated) DEVICE — VESSEL SEALER: Brand: ENDOWRIST

## (undated) DEVICE — COLUMN DRAPE

## (undated) DEVICE — 10 MM BABCOCKS WITH RATCHET HANDLES: Brand: ENDOPATH

## (undated) DEVICE — SUT ETHIBOND 0 CT-1 30 IN X424H

## (undated) DEVICE — TISSEEL DUPLOSPRAY APPLICATOR 40 CM W/SNAP LOCK

## (undated) DEVICE — URETERAL CATHETER ADAPTOR TIP

## (undated) DEVICE — BETHLEHEM UNIVERSAL MINOR GEN: Brand: CARDINAL HEALTH

## (undated) DEVICE — JACKSON-PRATT 100CC BULB RESERVOIR: Brand: CARDINAL HEALTH

## (undated) DEVICE — CADIERE FORCEPS: Brand: ENDOWRIST

## (undated) DEVICE — DRAPE TOWEL: Brand: CONVERTORS

## (undated) DEVICE — TUBING SMOKE EVAC W/FILTRATION DEVICE PLUMEPORT ACTIV

## (undated) DEVICE — ANTI-FOG SOLUTION WITH FOAM PAD: Brand: DEVON

## (undated) DEVICE — STAPLER 45 RELOAD WHITE: Brand: ENDOWRIST

## (undated) DEVICE — LUBRICANT INST ELECTROLUBE ANTISTK WO PAD

## (undated) DEVICE — STAPLER 45: Brand: ENDOWRIST

## (undated) DEVICE — TIP COVER ACCESSORY

## (undated) DEVICE — ENDOPATH PNEUMONEEDLE INSUFFLATION NEEDLES WITH LUER LOCK CONNECTORS 120MM: Brand: ENDOPATH

## (undated) DEVICE — 3000CC GUARDIAN II: Brand: GUARDIAN

## (undated) DEVICE — NEEDLE SPINAL 22G X 3.5IN  QUINCKE

## (undated) DEVICE — TISSEEL FIBRIN 4ML FROZEN

## (undated) DEVICE — TRAVELKIT CONTAINS FIRST STEP KIT (200ML EP-4 KIT) AND SOILED SCOPE BAG - 1 KIT: Brand: TRAVELKIT CONTAINS FIRST STEP KIT AND SOILED SCOPE BAG

## (undated) DEVICE — GLOVE SRG BIOGEL 7.5

## (undated) DEVICE — ADHESIVE SKIN CLSR DERMABOND NX

## (undated) DEVICE — TIBURON LAPAROSCOPIC ABDOMINAL DRAPE: Brand: CONVERTORS

## (undated) DEVICE — PENCIL ELECTROSURG E-Z CLEAN -0035H

## (undated) DEVICE — CHLORAPREP HI-LITE 26ML ORANGE

## (undated) DEVICE — JP CHANNEL DRAIN 19FR, FULL FLUTES: Brand: JACKSON-PRATT

## (undated) DEVICE — ENDOPOUCH RETRIEVER SPECIMEN RETRIEVAL BAGS: Brand: ENDOPOUCH RETRIEVER

## (undated) DEVICE — LARGE NEEDLE DRIVER: Brand: ENDOWRIST

## (undated) DEVICE — STRL PENROSE DRAIN 18" X 1/2": Brand: CARDINAL HEALTH

## (undated) DEVICE — SURGICAL GOWN, XL SMARTSLEEVE: Brand: CONVERTORS

## (undated) DEVICE — IRRIG ENDO FLO TUBING

## (undated) DEVICE — ARM DRAPE

## (undated) DEVICE — [HIGH FLOW INSUFFLATOR,  DO NOT USE IF PACKAGE IS DAMAGED,  KEEP DRY,  KEEP AWAY FROM SUNLIGHT,  PROTECT FROM HEAT AND RADIOACTIVE SOURCES.]: Brand: PNEUMOSURE

## (undated) DEVICE — INTENDED FOR TISSUE SEPARATION, AND OTHER PROCEDURES THAT REQUIRE A SHARP SURGICAL BLADE TO PUNCTURE OR CUT.: Brand: BARD-PARKER SAFETY BLADES SIZE 15, STERILE

## (undated) DEVICE — 2000CC GUARDIAN II: Brand: GUARDIAN

## (undated) DEVICE — TUBING SUCTION 5MM X 12 FT

## (undated) DEVICE — STAPLER 45 RELOAD BLUE: Brand: ENDOWRIST

## (undated) DEVICE — SYRINGE 30ML LL

## (undated) DEVICE — STAPLER 45 RELOAD: Brand: ENDOWRIST

## (undated) DEVICE — BLUE HEAT SCOPE WARMER

## (undated) DEVICE — MONOPOLAR CURVED SCISSORS: Brand: ENDOWRIST

## (undated) DEVICE — SUT MONOCRYL 4-0 PS-2 27 IN Y426H

## (undated) DEVICE — ABSORBABLE WOUND CLOSURE DEVICE: Brand: V-LOC 90